# Patient Record
Sex: FEMALE | Race: WHITE | NOT HISPANIC OR LATINO | Employment: OTHER | URBAN - METROPOLITAN AREA
[De-identification: names, ages, dates, MRNs, and addresses within clinical notes are randomized per-mention and may not be internally consistent; named-entity substitution may affect disease eponyms.]

---

## 2017-04-19 ENCOUNTER — HOSPITAL ENCOUNTER (OUTPATIENT)
Dept: LAB | Facility: MEDICAL CENTER | Age: 62
End: 2017-04-19
Attending: PHYSICIAN ASSISTANT
Payer: COMMERCIAL

## 2017-04-19 LAB — TSH SERPL DL<=0.005 MIU/L-ACNC: 4.09 UIU/ML (ref 0.3–3.7)

## 2017-04-19 PROCEDURE — 80061 LIPID PANEL: CPT

## 2017-04-19 PROCEDURE — 36415 COLL VENOUS BLD VENIPUNCTURE: CPT

## 2017-04-19 PROCEDURE — 84443 ASSAY THYROID STIM HORMONE: CPT

## 2017-04-20 LAB
CHOLEST SERPL-MCNC: 242 MG/DL (ref 100–199)
HDLC SERPL-MCNC: 83 MG/DL
LDLC SERPL CALC-MCNC: 133 MG/DL
TRIGL SERPL-MCNC: 129 MG/DL (ref 0–149)

## 2017-07-11 ENCOUNTER — HOSPITAL ENCOUNTER (OUTPATIENT)
Dept: LAB | Facility: MEDICAL CENTER | Age: 62
End: 2017-07-11
Attending: PHYSICIAN ASSISTANT
Payer: COMMERCIAL

## 2017-07-11 LAB
T4 SERPL-MCNC: 6.7 UG/DL (ref 4–12)
TSH SERPL DL<=0.005 MIU/L-ACNC: 3.35 UIU/ML (ref 0.3–3.7)

## 2017-07-11 PROCEDURE — 84436 ASSAY OF TOTAL THYROXINE: CPT

## 2017-07-11 PROCEDURE — 84443 ASSAY THYROID STIM HORMONE: CPT

## 2017-07-11 PROCEDURE — 36415 COLL VENOUS BLD VENIPUNCTURE: CPT

## 2017-08-14 ENCOUNTER — OFFICE VISIT (OUTPATIENT)
Dept: CARDIOLOGY | Facility: MEDICAL CENTER | Age: 62
End: 2017-08-14
Payer: COMMERCIAL

## 2017-08-14 VITALS
BODY MASS INDEX: 22.82 KG/M2 | HEIGHT: 65 IN | DIASTOLIC BLOOD PRESSURE: 62 MMHG | SYSTOLIC BLOOD PRESSURE: 92 MMHG | HEART RATE: 81 BPM | OXYGEN SATURATION: 99 % | WEIGHT: 137 LBS

## 2017-08-14 DIAGNOSIS — R00.2 PALPITATIONS: ICD-10-CM

## 2017-08-14 DIAGNOSIS — I49.3 PREMATURE VENTRICULAR CONTRACTIONS: ICD-10-CM

## 2017-08-14 DIAGNOSIS — E07.9 THYROID DYSFUNCTION: ICD-10-CM

## 2017-08-14 PROCEDURE — 99214 OFFICE O/P EST MOD 30 MIN: CPT | Performed by: INTERNAL MEDICINE

## 2017-08-14 RX ORDER — THYROID,PORK 16.25 MG
TABLET ORAL
Refills: 1 | COMMUNITY
Start: 2017-07-06 | End: 2021-02-03

## 2017-08-14 ASSESSMENT — ENCOUNTER SYMPTOMS
FOCAL WEAKNESS: 0
WEAKNESS: 0
PND: 0
SPEECH CHANGE: 0
NAUSEA: 0
ORTHOPNEA: 0
HEMOPTYSIS: 0
ABDOMINAL PAIN: 0
CLAUDICATION: 0
DIZZINESS: 0
VOMITING: 0
MYALGIAS: 0
PALPITATIONS: 1
SHORTNESS OF BREATH: 0

## 2017-08-14 NOTE — MR AVS SNAPSHOT
"        Allyn Rosenberg   2017 3:40 PM   Office Visit   MRN: [id number]    Department:  [facility]   Dept Phone:  [phone]    Description:  Female : 1955   Provider:  Jacquelyn Nur M.D.           Reason for Visit     Follow-Up           Allergies as of 2017     Allergen Noted Reactions    Other Drug 2017       Pt states allergic to \"myacins\"      You were diagnosed with     Palpitations   [785.1.ICD-9-CM]       Premature ventricular contractions   [838116]         Vital Signs     Blood Pressure Pulse Height Weight Body Mass Index Oxygen Saturation    92/62 mmHg 81 1.651 m (5' 5\") 62.143 kg (137 lb) 22.80 kg/m2 99%    Smoking Status                   Former Smoker           Basic Information     Date Of Birth Sex Race Ethnicity Preferred Language    1955 Female White Non- English      Health Maintenance        Date Due Completion Dates    IMM DTaP/Tdap/Td Vaccine (1 - Tdap) 1973 ---    PAP SMEAR 1975 ---    COLONOSCOPY 2004 ---    IMM ZOSTER VACCINE 2014 ---    MAMMOGRAM 2017    IMM INFLUENZA (1) 2017 ---            Current Immunizations     No immunizations on file.      Below and/or attached are the medications your provider expects you to take. Review all of your home medications and newly ordered medications with your provider and/or pharmacist. Follow medication instructions as directed by your provider and/or pharmacist. Please keep your medication list with you and share with your provider. Update the information when medications are discontinued, doses are changed, or new medications (including over-the-counter products) are added; and carry medication information at all times in the event of emergency situations     Allergies:  OTHER DRUG - (reactions not documented)               Medications  Valid as of: 2017 -  3:47 PM    Generic Name Brand Name Tablet Size Instructions for use    Amitriptyline HCl (Tab) " ELAVIL 10 MG Take 10 mg by mouth every day.        BuPROPion HCl (TABLET SR 24 HR) WELLBUTRIN  MG Take 150 mg by mouth every day.        Cholecalciferol   Take 50,000 Units by mouth every 7 days.        Multiple Vitamin   Take  by mouth.        Propranolol HCl (Tab) INDERAL 10 MG Take 1 Tab by mouth every 6 hours as needed.        Thyroid (Tab) ARMOUR THYROID 15 MG Take 1 Tab by mouth every day.        Thyroid (Tab) NATURE-THROID 16.25 MG TK 1 AND 1/2 TS PO QD        .                 Medicines prescribed today were sent to:     [id number]  [place], [place] - [address] [facility]    [address] [place] [address]    Phone: [phone] Fax: [phone]    Open 24 Hours?: No      Medication refill instructions:       If your prescription bottle indicates you have medication refills left, it is not necessary to call your provider’s office. Please contact your pharmacy and they will refill your medication.    If your prescription bottle indicates you do not have any refills left, you may request refills at any time through one of the following ways: The online [employer] system (except Urgent Care), by calling your provider’s office, or by asking your pharmacy to contact your provider’s office with a refill request. Medication refills are processed only during regular business hours and may not be available until the next business day. Your provider may request additional information or to have a follow-up visit with you prior to refilling your medication.   *Please Note: Medication refills are assigned a new Rx number when refilled electronically. Your pharmacy may indicate that no refills were authorized even though a new prescription for the same medication is available at the pharmacy. Please request the medicine by name with the pharmacy before contacting your provider for a refill.           [employer] Access Code: Activation code not generated  Current [employer]  Status: Active

## 2017-08-14 NOTE — PROGRESS NOTES
"Subjective:   Allyn Rosenberg is a 62 y.o. female who presents today for f/u PVCs    The patient is doing well from cardiac standpoint.   Much less palpitations lately.  Rarely needs to take propanolol and does not wish to take them due to concern about potential interaction with thyroid medication.  She denies any chest pain, or heart failure type symptoms.  TSH Was slightly high in April. Had her thyroid dose adjusted.  Back down to NL last month    Family History   Problem Relation Age of Onset   • Hypertension Father    • Lung Disease Father    • Other Father    • Heart Disease Neg Hx      History   Smoking status   • Former Smoker -- 0.50 packs/day for 10 years   • Types: Cigarettes   Smokeless tobacco   • Not on file     Allergies   Allergen Reactions   • Other Drug      Pt states allergic to \"myacins\"     Outpatient Encounter Prescriptions as of 8/14/2017   Medication Sig Dispense Refill   • NATURE-THROID 16.25 MG Tab TK 1 AND 1/2 TS PO QD  1   • Cholecalciferol (VITAMIN D PO) Take 50,000 Units by mouth every 7 days.     • amitriptyline (ELAVIL) 10 MG Tab Take 10 mg by mouth every day.  1   • buPROPion (WELLBUTRIN XL) 150 MG XL tablet Take 150 mg by mouth every day.  2   • propranolol (INDERAL) 10 MG Tab Take 1 Tab by mouth every 6 hours as needed. 30 Tab 5   • Multiple Vitamin (MULTIVITAMINS PO) Take  by mouth.     • thyroid (ARMOUR THYROID) 15 MG Tab Take 1 Tab by mouth every day. 30 Tab 3     No facility-administered encounter medications on file as of 8/14/2017.     Review of Systems   Constitutional: Negative for malaise/fatigue.   HENT: Negative for congestion.    Respiratory: Negative for hemoptysis and shortness of breath.    Cardiovascular: Positive for palpitations. Negative for chest pain, orthopnea, claudication, leg swelling and PND.        More noticeable at night but better since her thyroid dose was increased   Gastrointestinal: Negative for nausea, vomiting and abdominal pain. " "  Musculoskeletal: Negative for myalgias.   Neurological: Negative for dizziness, speech change, focal weakness and weakness.        Objective:   BP 92/62 mmHg  Pulse 81  Ht 1.651 m (5' 5\")  Wt 62.143 kg (137 lb)  BMI 22.80 kg/m2  SpO2 99%    Physical Exam   Constitutional: She is oriented to person, place, and time. No distress.   HENT:   Mouth/Throat: Mucous membranes are normal.   Eyes: Conjunctivae and EOM are normal.   Neck: No JVD present. No tracheal deviation present. No thyroid mass and no thyromegaly present.   Cardiovascular: Normal rate, regular rhythm and intact distal pulses.    No murmur heard.  Pulmonary/Chest: Effort normal and breath sounds normal. No respiratory distress. She exhibits no tenderness.   Abdominal: Soft. There is no tenderness.   Musculoskeletal: Normal range of motion. She exhibits no edema.   Neurological: She is alert and oriented to person, place, and time. She has normal strength. She displays no tremor.   Skin: Skin is warm and dry. She is not diaphoretic.   Psychiatric: She has a normal mood and affect. Her behavior is normal.   Vitals reviewed.      Assessment:     1. Palpitations     2. Premature ventricular contractions     3. Thyroid dysfunction         Medical Decision Making:  Today's Assessment / Status / Plan:     She is doing better.  We again discussed good prognosis nature of PVCs with normal heart.  She prefers conservative approach which is quite acceptable.  Will see her in 1 year or sooner as needed.  We will keep you posted about our findings and further recommendations as they become available. Please also do not hesitate to call for any questions.  Thank you kindly for allowing me to participate in the care of this patient.  "

## 2017-08-14 NOTE — Clinical Note
"     [facility]   [address], [address]  [place] [address]  Phone: [phone]  Fax: [phone]              Allyn Rosenberg  1955    Encounter Date: 8/14/2017    Jacquelyn Nur M.D.          PROGRESS NOTE:  Subjective:   Allyn Rosenberg is a 62 y.o. female who presents today for f/u PVCs    The patient is doing well from cardiac standpoint.   Much less palpitations lately.  Rarely needs to take propanolol and does not wish to take them due to concern about potential interaction with thyroid medication.  She denies any chest pain, or heart failure type symptoms.  TSH Was slightly high in April. Had her thyroid dose adjusted.  Back down to NL last month    Family History   Problem Relation Age of Onset   • Hypertension Father    • Lung Disease Father    • Other Father    • Heart Disease Neg Hx      History   Smoking status   • Former Smoker -- 0.50 packs/day for 10 years   • Types: Cigarettes   Smokeless tobacco   • Not on file     Allergies   Allergen Reactions   • Other Drug      Pt states allergic to \"myacins\"     Outpatient Encounter Prescriptions as of 8/14/2017   Medication Sig Dispense Refill   • NATURE-THROID 16.25 MG Tab TK 1 AND 1/2 TS PO QD  1   • Cholecalciferol (VITAMIN D PO) Take 50,000 Units by mouth every 7 days.     • amitriptyline (ELAVIL) 10 MG Tab Take 10 mg by mouth every day.  1   • buPROPion (WELLBUTRIN XL) 150 MG XL tablet Take 150 mg by mouth every day.  2   • propranolol (INDERAL) 10 MG Tab Take 1 Tab by mouth every 6 hours as needed. 30 Tab 5   • Multiple Vitamin (MULTIVITAMINS PO) Take  by mouth.     • thyroid (ARMOUR THYROID) 15 MG Tab Take 1 Tab by mouth every day. 30 Tab 3     No facility-administered encounter medications on file as of 8/14/2017.     Review of Systems   Constitutional: Negative for malaise/fatigue.   HENT: Negative for congestion.    Respiratory: Negative for hemoptysis and shortness of breath.    " "  Cardiovascular: Positive for palpitations. Negative for chest pain, orthopnea, claudication, leg swelling and PND.        More noticeable at night but better since her thyroid dose was increased   Gastrointestinal: Negative for nausea, vomiting and abdominal pain.   Musculoskeletal: Negative for myalgias.   Neurological: Negative for dizziness, speech change, focal weakness and weakness.        Objective:   BP 92/62 mmHg  Pulse 81  Ht 1.651 m (5' 5\")  Wt 62.143 kg (137 lb)  BMI 22.80 kg/m2  SpO2 99%    Physical Exam   Constitutional: She is oriented to person, place, and time. No distress.   HENT:   Mouth/Throat: Mucous membranes are normal.   Eyes: Conjunctivae and EOM are normal.   Neck: No JVD present. No tracheal deviation present. No thyroid mass and no thyromegaly present.   Cardiovascular: Normal rate, regular rhythm and intact distal pulses.    No murmur heard.  Pulmonary/Chest: Effort normal and breath sounds normal. No respiratory distress. She exhibits no tenderness.   Abdominal: Soft. There is no tenderness.   Musculoskeletal: Normal range of motion. She exhibits no edema.   Neurological: She is alert and oriented to person, place, and time. She has normal strength. She displays no tremor.   Skin: Skin is warm and dry. She is not diaphoretic.   Psychiatric: She has a normal mood and affect. Her behavior is normal.   Vitals reviewed.      Assessment:     1. Palpitations     2. Premature ventricular contractions     3. Thyroid dysfunction         Medical Decision Making:  Today's Assessment / Status / Plan:     She is doing better.  We again discussed good prognosis nature of PVCs with normal heart.  She prefers conservative approach which is quite acceptable.  Will see her in 1 year or sooner as needed.  We will keep you posted about our findings and further recommendations as they become available. Please also do not hesitate to call for any questions.  Thank you kindly for allowing me to " participate in the care of this patient.      No Recipients

## 2017-09-10 ENCOUNTER — APPOINTMENT (OUTPATIENT)
Dept: RADIOLOGY | Facility: MEDICAL CENTER | Age: 62
End: 2017-09-10
Attending: EMERGENCY MEDICINE
Payer: COMMERCIAL

## 2017-09-10 ENCOUNTER — HOSPITAL ENCOUNTER (EMERGENCY)
Facility: MEDICAL CENTER | Age: 62
End: 2017-09-10
Attending: EMERGENCY MEDICINE
Payer: COMMERCIAL

## 2017-09-10 VITALS
DIASTOLIC BLOOD PRESSURE: 60 MMHG | TEMPERATURE: 96.7 F | RESPIRATION RATE: 16 BRPM | BODY MASS INDEX: 22.04 KG/M2 | HEART RATE: 65 BPM | WEIGHT: 137.13 LBS | OXYGEN SATURATION: 97 % | SYSTOLIC BLOOD PRESSURE: 129 MMHG | HEIGHT: 66 IN

## 2017-09-10 DIAGNOSIS — K57.31 DIVERTICULOSIS OF LARGE INTESTINE WITH HEMORRHAGE: ICD-10-CM

## 2017-09-10 DIAGNOSIS — K62.5 RECTAL BLEEDING: ICD-10-CM

## 2017-09-10 LAB
ALBUMIN SERPL BCP-MCNC: 4.3 G/DL (ref 3.2–4.9)
ALBUMIN/GLOB SERPL: 1.5 G/DL
ALP SERPL-CCNC: 73 U/L (ref 30–99)
ALT SERPL-CCNC: 10 U/L (ref 2–50)
ANION GAP SERPL CALC-SCNC: 9 MMOL/L (ref 0–11.9)
APTT PPP: 25 SEC (ref 24.7–36)
AST SERPL-CCNC: 12 U/L (ref 12–45)
BASOPHILS # BLD AUTO: 0.3 % (ref 0–1.8)
BASOPHILS # BLD: 0.03 K/UL (ref 0–0.12)
BILIRUB SERPL-MCNC: 0.5 MG/DL (ref 0.1–1.5)
BUN SERPL-MCNC: 15 MG/DL (ref 8–22)
CALCIUM SERPL-MCNC: 9.7 MG/DL (ref 8.5–10.5)
CHLORIDE SERPL-SCNC: 105 MMOL/L (ref 96–112)
CO2 SERPL-SCNC: 24 MMOL/L (ref 20–33)
CREAT SERPL-MCNC: 0.7 MG/DL (ref 0.5–1.4)
EOSINOPHIL # BLD AUTO: 0.05 K/UL (ref 0–0.51)
EOSINOPHIL NFR BLD: 0.5 % (ref 0–6.9)
ERYTHROCYTE [DISTWIDTH] IN BLOOD BY AUTOMATED COUNT: 44.2 FL (ref 35.9–50)
GFR SERPL CREATININE-BSD FRML MDRD: >60 ML/MIN/1.73 M 2
GLOBULIN SER CALC-MCNC: 2.9 G/DL (ref 1.9–3.5)
GLUCOSE SERPL-MCNC: 96 MG/DL (ref 65–99)
HCT VFR BLD AUTO: 38.3 % (ref 37–47)
HGB BLD-MCNC: 12.8 G/DL (ref 12–16)
IMM GRANULOCYTES # BLD AUTO: 0.03 K/UL (ref 0–0.11)
IMM GRANULOCYTES NFR BLD AUTO: 0.3 % (ref 0–0.9)
INR PPP: 0.9 (ref 0.87–1.13)
LIPASE SERPL-CCNC: 51 U/L (ref 11–82)
LYMPHOCYTES # BLD AUTO: 1.56 K/UL (ref 1–4.8)
LYMPHOCYTES NFR BLD: 16.6 % (ref 22–41)
MCH RBC QN AUTO: 31.2 PG (ref 27–33)
MCHC RBC AUTO-ENTMCNC: 33.4 G/DL (ref 33.6–35)
MCV RBC AUTO: 93.4 FL (ref 81.4–97.8)
MONOCYTES # BLD AUTO: 0.73 K/UL (ref 0–0.85)
MONOCYTES NFR BLD AUTO: 7.7 % (ref 0–13.4)
NEUTROPHILS # BLD AUTO: 7.02 K/UL (ref 2–7.15)
NEUTROPHILS NFR BLD: 74.6 % (ref 44–72)
NRBC # BLD AUTO: 0.02 K/UL
NRBC BLD AUTO-RTO: 0.2 /100 WBC
PLATELET # BLD AUTO: 265 K/UL (ref 164–446)
PMV BLD AUTO: 9.9 FL (ref 9–12.9)
POTASSIUM SERPL-SCNC: 3.4 MMOL/L (ref 3.6–5.5)
PROT SERPL-MCNC: 7.2 G/DL (ref 6–8.2)
PROTHROMBIN TIME: 12.4 SEC (ref 12–14.6)
RBC # BLD AUTO: 4.1 M/UL (ref 4.2–5.4)
SODIUM SERPL-SCNC: 138 MMOL/L (ref 135–145)
WBC # BLD AUTO: 9.4 K/UL (ref 4.8–10.8)

## 2017-09-10 PROCEDURE — 94760 N-INVAS EAR/PLS OXIMETRY 1: CPT

## 2017-09-10 PROCEDURE — 80053 COMPREHEN METABOLIC PANEL: CPT

## 2017-09-10 PROCEDURE — 74177 CT ABD & PELVIS W/CONTRAST: CPT

## 2017-09-10 PROCEDURE — 85025 COMPLETE CBC W/AUTO DIFF WBC: CPT

## 2017-09-10 PROCEDURE — 85730 THROMBOPLASTIN TIME PARTIAL: CPT

## 2017-09-10 PROCEDURE — 700117 HCHG RX CONTRAST REV CODE 255: Performed by: EMERGENCY MEDICINE

## 2017-09-10 PROCEDURE — 83690 ASSAY OF LIPASE: CPT

## 2017-09-10 PROCEDURE — 700105 HCHG RX REV CODE 258: Performed by: EMERGENCY MEDICINE

## 2017-09-10 PROCEDURE — 99284 EMERGENCY DEPT VISIT MOD MDM: CPT

## 2017-09-10 PROCEDURE — 85610 PROTHROMBIN TIME: CPT

## 2017-09-10 RX ORDER — SODIUM CHLORIDE 9 MG/ML
1000 INJECTION, SOLUTION INTRAVENOUS ONCE
Status: COMPLETED | OUTPATIENT
Start: 2017-09-10 | End: 2017-09-10

## 2017-09-10 RX ADMIN — IOHEXOL 100 ML: 350 INJECTION, SOLUTION INTRAVENOUS at 11:55

## 2017-09-10 RX ADMIN — SODIUM CHLORIDE 1000 ML: 9 INJECTION, SOLUTION INTRAVENOUS at 11:00

## 2017-09-10 NOTE — ED NOTES
.  Chief Complaint   Patient presents with   • Rectal Bleeding     last night    • Rectal Pain     last night with bm   • Bloody Stools     brb this am     Ambulated to triage with above c/c.

## 2017-09-10 NOTE — ED PROVIDER NOTES
ED Provider Note    Scribed for Jose R Smith M.D. by Hector Oscar. 9/10/2017  10:04 AM    Primary care provider: Jennifer Mckenzie P.A.-C.  Means of arrival: Walk in  History obtained from: Patient  History limited by: None    CHIEF COMPLAINT  Chief Complaint   Patient presents with   • Rectal Bleeding     last night    • Rectal Pain     last night with bm   • Bloody Stools     brb this am       HPI  Allyn Rosenberg is a 62 y.o. female who presents to the Emergency Department complaining of rectal bleeding onset last night when she was having a bowel movement. She describes her bowel movement as very painful with bleeding. Her bleeding is described as bright red with no stool. She has associated lower abdominal pain. When she tried to have a bowel movement today, she reports experiencing immediate pain before she passed blood with no stool. The patient states she is not taking any blood thinners. She does not currently have a GI doctor. Denies nausea, vomiting, fever, loss of appetite, rectal pain.      REVIEW OF SYSTEMS  Pertinent positives include rectal bleeding, abdominal pain. Pertinent negatives include no nausea, vomiting, fever, loss of appetite, rectal pain. All other systems reviewed and negative. C.     PAST MEDICAL HISTORY   No pertinent past medical history.     SURGICAL HISTORY  patient denies any surgical history    SOCIAL HISTORY  Social History   Substance Use Topics   • Smoking status: Former Smoker     Packs/day: 0.50     Years: 10.00     Types: Cigarettes   • Alcohol use 3.0 - 6.0 oz/week     5 - 10 Glasses of wine per week      History   Drug Use No       FAMILY HISTORY  Family History   Problem Relation Age of Onset   • Hypertension Father    • Lung Disease Father    • Other Father    • Heart Disease Neg Hx        CURRENT MEDICATIONS  No current facility-administered medications on file prior to encounter.      Current Outpatient Prescriptions on File Prior to Encounter  "  Medication Sig Dispense Refill   • NATURE-THROID 16.25 MG Tab TK 1 AND 1/2 TS PO QD  1   • Cholecalciferol (VITAMIN D PO) Take 50,000 Units by mouth every 7 days.     • amitriptyline (ELAVIL) 10 MG Tab Take 10 mg by mouth every day.  1   • buPROPion (WELLBUTRIN XL) 150 MG XL tablet Take 150 mg by mouth every day.  2   • thyroid (ARMOUR THYROID) 15 MG Tab Take 1 Tab by mouth every day. 30 Tab 3   • propranolol (INDERAL) 10 MG Tab Take 1 Tab by mouth every 6 hours as needed. 30 Tab 5   • Multiple Vitamin (MULTIVITAMINS PO) Take  by mouth.        ALLERGIES  Allergies   Allergen Reactions   • Other Drug      Pt states allergic to \"myacins\"       PHYSICAL EXAM  VITAL SIGNS: /60   Pulse 77   Temp 35.9 °C (96.7 °F)   Resp 16   Ht 1.676 m (5' 6\")   Wt 62.2 kg (137 lb 2 oz)   SpO2 100%   BMI 22.13 kg/m²     Constitutional: Well developed, Well nourished, mild distress, Non-toxic appearance.   HENT: Normocephalic, Atraumatic, Bilateral external ears normal, Oropharynx moist, No oral exudates.   Eyes: PERRLA, EOMI, Conjunctiva normal, No discharge.   Neck: No tenderness, Supple, No stridor.   Lymphatic: No lymphadenopathy noted.   Cardiovascular: Normal heart rate, Normal rhythm.   Thorax & Lungs: Clear to auscultation bilaterally, No respiratory distress, No wheezing, No crackles.   Abdomen: Superpubic abdominal tenderness, Soft, No masses, No pulsatile masses.   Rectal: without any fissures or hemorrhoids seen   Skin: Warm, Dry, No erythema, No rash.   Extremities:, No edema No cyanosis.   Musculoskeletal: No tenderness to palpation or major deformities noted.  Intact distal pulses  Neurologic: Awake, alert. Moves all extremities spontaneously.  Psychiatric: Affect normal, Judgment normal, Mood normal.     LABS  Results for orders placed or performed during the hospital encounter of 09/10/17   CBC WITH DIFFERENTIAL   Result Value Ref Range    WBC 9.4 4.8 - 10.8 K/uL    RBC 4.10 (L) 4.20 - 5.40 M/uL    " Hemoglobin 12.8 12.0 - 16.0 g/dL    Hematocrit 38.3 37.0 - 47.0 %    MCV 93.4 81.4 - 97.8 fL    MCH 31.2 27.0 - 33.0 pg    MCHC 33.4 (L) 33.6 - 35.0 g/dL    RDW 44.2 35.9 - 50.0 fL    Platelet Count 265 164 - 446 K/uL    MPV 9.9 9.0 - 12.9 fL    Neutrophils-Polys 74.60 (H) 44.00 - 72.00 %    Lymphocytes 16.60 (L) 22.00 - 41.00 %    Monocytes 7.70 0.00 - 13.40 %    Eosinophils 0.50 0.00 - 6.90 %    Basophils 0.30 0.00 - 1.80 %    Immature Granulocytes 0.30 0.00 - 0.90 %    Nucleated RBC 0.20 /100 WBC    Neutrophils (Absolute) 7.02 2.00 - 7.15 K/uL    Lymphs (Absolute) 1.56 1.00 - 4.80 K/uL    Monos (Absolute) 0.73 0.00 - 0.85 K/uL    Eos (Absolute) 0.05 0.00 - 0.51 K/uL    Baso (Absolute) 0.03 0.00 - 0.12 K/uL    Immature Granulocytes (abs) 0.03 0.00 - 0.11 K/uL    NRBC (Absolute) 0.02 K/uL   COMP METABOLIC PANEL   Result Value Ref Range    Sodium 138 135 - 145 mmol/L    Potassium 3.4 (L) 3.6 - 5.5 mmol/L    Chloride 105 96 - 112 mmol/L    Co2 24 20 - 33 mmol/L    Anion Gap 9.0 0.0 - 11.9    Glucose 96 65 - 99 mg/dL    Bun 15 8 - 22 mg/dL    Creatinine 0.70 0.50 - 1.40 mg/dL    Calcium 9.7 8.5 - 10.5 mg/dL    AST(SGOT) 12 12 - 45 U/L    ALT(SGPT) 10 2 - 50 U/L    Alkaline Phosphatase 73 30 - 99 U/L    Total Bilirubin 0.5 0.1 - 1.5 mg/dL    Albumin 4.3 3.2 - 4.9 g/dL    Total Protein 7.2 6.0 - 8.2 g/dL    Globulin 2.9 1.9 - 3.5 g/dL    A-G Ratio 1.5 g/dL   LIPASE   Result Value Ref Range    Lipase 51 11 - 82 U/L   APTT   Result Value Ref Range    APTT 25.0 24.7 - 36.0 sec   PROTHROMBIN TIME (INR)   Result Value Ref Range    PT 12.4 12.0 - 14.6 sec    INR 0.90 0.87 - 1.13   ESTIMATED GFR   Result Value Ref Range    GFR If African American >60 >60 mL/min/1.73 m 2    GFR If Non African American >60 >60 mL/min/1.73 m 2      All labs reviewed by me.    RADIOLOGY  CT-ABDOMEN-PELVIS WITH   Final Result      Diverticulosis without evidence of diverticulitis.      Hepatic and left renal cysts.      Appendix not visualized.         The radiologist's interpretation of all radiological studies have been reviewed by me.    COURSE & MEDICAL DECISION MAKING  Pertinent Labs & Imaging studies reviewed. (See chart for details)    I reviewed the patient's medical records.    10:04 AM - Patient seen and examined at bedside. Patient will be treated with NS IV 1000 ml due to IV contrast. Ordered abdomen Ct, CBC, CMP, lipase, APTT, prothrombin  to evaluate her symptoms. The differential diagnoses include but are not limited to: internal hemorrhoids, diverticulitis, mass     10:29 AM Performed rectal exam.    2:03 PM I discussed the patient's case and the above findings with gastroenterologist will follow-up with the patient.    Decision Making:  Patient comes in with bright red blood per rectum also with some lower abdominal pain, workup here shows a hemoglobin of 12, CT scan shows diverticulosis without any diverticulitis or ischemic colitis, no white blood cell count. Patient has not been passing any more stool here, we'll discharge patient home, have the patient follow up with GI in the next few days, return with worsening symptoms. Repeat abdominal examination is benign.     The patient will return for new or worsening symptoms and is stable at the time of discharge.    The patient is referred to a primary physician for blood pressure management, diabetic screening, and for all other preventative health concerns.    DISPOSITION:  Patient will be discharged home in stable condition.    FOLLOW UP:  [facility], Emergency Dept  [address] [place] [address] [phone]    If symptoms worsen      OUTPATIENT MEDICATIONS:  Discharge Medication List as of 9/10/2017  2:17 PM            FINAL IMPRESSION  1. Rectal bleeding    2. Diverticulosis of large intestine with hemorrhage          IHector am (Scribe) scribing for, and in the presence of, Jose R Smith M.D..    Electronically signed by: Hector Oscar  (Scribe), 9/10/2017    IJose R M.D. personally performed the services described in this documentation, as scribed by Hector Oscar in my presence, and it is both accurate and complete.    The note accurately reflects work and decisions made by me.  Jose R Smith  9/10/2017  3:46 PM

## 2017-09-10 NOTE — ED NOTES
Pt reports rectal pain with bowel movement last night.  Pt states she did not have a large or exceptionally hard BM, that it was normal.  Pt reports RLQ pain and bright red rectal bleeding starting this morning.

## 2017-09-10 NOTE — ED NOTES
Pt provided with discharge instructions, instructions for follow up appointment with PCP, s/s of when to seek emergency care.  Pt verbalizes understanding.  Pt discharged in good condition

## 2017-09-10 NOTE — DISCHARGE INSTRUCTIONS
"Please follow-up with your primary care provider for blood pressure management.        Bloody Stools  Bloody stools often mean that there is a problem in the digestive tract. Your caregiver may use the term \"melena\" to describe black, tarry, and bad smelling stools or \"hematochezia\" to describe red or maroon-colored stools. Blood seen in the stool can be caused by bleeding anywhere along the intestinal tract.   A black stool usually means that blood is coming from the upper part of the gastrointestinal tract (esophagus, stomach, or small bowel). Passing maroon-colored stools or bright red blood usually means that blood is coming from lower down in the large bowel or the rectum. However, sometimes massive bleeding in the stomach or small intestine can cause bright red bloody stools.   Consuming black licorice, lead, iron pills, medicines containing bismuth subsalicylate, or blueberries can also cause black stools. Your caregiver can test black stools to see if blood is present.  It is important that the cause of the bleeding be found. Treatment can then be started, and the problem can be corrected. Rectal bleeding may not be serious, but you should not assume everything is okay until you know the cause. It is very important to follow up with your caregiver or a specialist in gastrointestinal problems.  CAUSES   Blood in the stools can come from various underlying causes. Often, the cause is not found during your first visit. Testing is often needed to discover the cause of bleeding in the gastrointestinal tract. Causes range from simple to serious or even life-threatening. Possible causes include:  · Hemorrhoids. These are veins that are full of blood (engorged) in the rectum. They cause pain, inflammation, and may bleed.  · Anal fissures. These are areas of painful tearing which may bleed. They are often caused by passing hard stool.  · Diverticulosis. These are pouches that form on the colon over time, with age, " and may bleed significantly.  · Diverticulitis. This is inflammation in areas with diverticulosis. It can cause pain, fever, and bloody stools, although bleeding is rare.  · Proctitis and colitis. These are inflamed areas of the rectum or colon. They may cause pain, fever, and bloody stools.  · Polyps and cancer. Colon cancer is a leading cause of preventable cancer death. It often starts out as precancerous polyps that can be removed during a colonoscopy, preventing progression into cancer. Sometimes, polyps and cancer may cause rectal bleeding.  · Gastritis and ulcers. Bleeding from the upper gastrointestinal tract (near the stomach) may travel through the intestines and produce black, sometimes tarry, often bad smelling stools. In certain cases, if the bleeding is fast enough, the stools may not be black, but red and the condition may be life-threatening.  SYMPTOMS   You may have stools that are bright red and bloody, that are normal color with blood on them, or that are dark black and tarry. In some cases, you may only have blood in the toilet bowl. Any of these cases need medical care. You may also have:  · Pain at the anus or anywhere in the rectum.  · Lightheadedness or feeling faint.  · Extreme weakness.  · Nausea or vomiting.  · Fever.  DIAGNOSIS  Your caregiver may use the following methods to find the cause of your bleeding:  · Taking a medical history. Age is important. Older people tend to develop polyps and cancer more often. If there is anal pain and a hard, large stool associated with bleeding, a tear of the anus may be the cause. If blood drips into the toilet after a bowel movement, bleeding hemorrhoids may be the problem. The color and frequency of the bleeding are additional considerations. In most cases, the medical history provides clues, but seldom the final answer.  · A visual and finger (digital) exam. Your caregiver will inspect the anal area, looking for tears and hemorrhoids. A finger  exam can provide information when there is tenderness or a growth inside. In men, the prostate is also examined.  · Endoscopy. Several types of small, long scopes (endoscopes) are used to view the colon.  · In the office, your caregiver may use a rigid, or more commonly, a flexible viewing sigmoidoscope. This exam is called flexible sigmoidoscopy. It is performed in 5 to 10 minutes.  · A more thorough exam is accomplished with a colonoscope. It allows your caregiver to view the entire 5 to 6 foot long colon. Medicine to help you relax (sedative) is usually given for this exam. Frequently, a bleeding lesion may be present beyond the reach of the sigmoidoscope. So, a colonoscopy may be the best exam to start with. Both exams are usually done on an outpatient basis. This means the patient does not stay overnight in the hospital or surgery center.  · An upper endoscopy may be needed to examine your stomach. Sedation is used and a flexible endoscope is put in your mouth, down to your stomach.  · A barium enema X-ray. This is an X-ray exam. It uses liquid barium inserted by enema into the rectum. This test alone may not identify an actual bleeding point. X-rays highlight abnormal shadows, such as those made by lumps (tumors), diverticuli, or colitis.  TREATMENT   Treatment depends on the cause of your bleeding.   · For bleeding from the stomach or colon, the caregiver doing your endoscopy or colonoscopy may be able to stop the bleeding as part of the procedure.  · Inflammation or infection of the colon can be treated with medicines.  · Many rectal problems can be treated with creams, suppositories, or warm baths.  · Surgery is sometimes needed.  · Blood transfusions are sometimes needed if you have lost a lot of blood.  · For any bleeding problem, let your caregiver know if you take aspirin or other blood thinners regularly.  HOME CARE INSTRUCTIONS   · Take any medicines exactly as prescribed.  · Keep your stools soft by  eating a diet high in fiber. Prunes (1 to 3 a day) work well for many people.  · Drink enough water and fluids to keep your urine clear or pale yellow.  · Take sitz baths if advised. A sitz bath is when you sit in a bathtub with warm water for 10 to 15 minutes to soak, soothe, and cleanse the rectal area.  · If enemas or suppositories are advised, be sure you know how to use them. Tell your caregiver if you have problems with this.  · Monitor your bowel movements to look for signs of improvement or worsening.  SEEK MEDICAL CARE IF:   · You do not improve in the time expected.  · Your condition worsens after initial improvement.  · You develop any new symptoms.  SEEK IMMEDIATE MEDICAL CARE IF:   · You develop severe or prolonged rectal bleeding.  · You vomit blood.  · You feel weak or faint.  · You have a fever.  MAKE SURE YOU:  · Understand these instructions.  · Will watch your condition.  · Will get help right away if you are not doing well or get worse.  Document Released: 12/08/2003 Document Revised: 03/11/2013 Document Reviewed: 05/04/2012  [employer]® Patient Information ©2014 [employer].      Diverticulosis  Diverticulosis is the condition that develops when small pouches (diverticula) form in the wall of your colon. Your colon, or large intestine, is where water is absorbed and stool is formed. The pouches form when the inside layer of your colon pushes through weak spots in the outer layers of your colon.  CAUSES   No one knows exactly what causes diverticulosis.  RISK FACTORS  · Being older than 50. Your risk for this condition increases with age. Diverticulosis is rare in people younger than 40 years. By age 80, almost everyone has it.  · Eating a low-fiber diet.  · Being frequently constipated.  · Being overweight.  · Not getting enough exercise.  · Smoking.  · Taking over-the-counter pain medicines, like aspirin and ibuprofen.  SYMPTOMS   Most people with diverticulosis do not have symptoms.  DIAGNOSIS    Because diverticulosis often has no symptoms, health care providers often discover the condition during an exam for other colon problems. In many cases, a health care provider will diagnose diverticulosis while using a flexible scope to examine the colon (colonoscopy).  TREATMENT   If you have never developed an infection related to diverticulosis, you may not need treatment. If you have had an infection before, treatment may include:  · Eating more fruits, vegetables, and grains.  · Taking a fiber supplement.  · Taking a live bacteria supplement (probiotic).  · Taking medicine to relax your colon.  HOME CARE INSTRUCTIONS   · Drink at least 6-8 glasses of water each day to prevent constipation.  · Try not to strain when you have a bowel movement.  · Keep all follow-up appointments.  If you have had an infection before:   · Increase the fiber in your diet as directed by your health care provider or dietitian.  · Take a dietary fiber supplement if your health care provider approves.  · Only take medicines as directed by your health care provider.  SEEK MEDICAL CARE IF:   · You have abdominal pain.  · You have bloating.  · You have cramps.  · You have not gone to the bathroom in 3 days.  SEEK IMMEDIATE MEDICAL CARE IF:   · Your pain gets worse.  · Your bloating becomes very bad.  · You have a fever or chills, and your symptoms suddenly get worse.  · You begin vomiting.  · You have bowel movements that are bloody or black.  MAKE SURE YOU:  · Understand these instructions.  · Will watch your condition.  · Will get help right away if you are not doing well or get worse.     This information is not intended to replace advice given to you by your health care provider. Make sure you discuss any questions you have with your health care provider.     Document Released: 09/14/2005 Document Revised: 12/23/2014 Document Reviewed: 11/12/2014  [employer] Interactive Patient Education ©2016 [employer]

## 2017-10-12 ENCOUNTER — HOSPITAL ENCOUNTER (OUTPATIENT)
Dept: LAB | Facility: MEDICAL CENTER | Age: 62
End: 2017-10-12
Attending: NURSE PRACTITIONER
Payer: COMMERCIAL

## 2017-10-12 LAB
CHOLEST SERPL-MCNC: 233 MG/DL (ref 100–199)
HDLC SERPL-MCNC: 86 MG/DL
LDLC SERPL CALC-MCNC: 130 MG/DL
T4 SERPL-MCNC: 6.5 UG/DL (ref 4–12)
TRIGL SERPL-MCNC: 87 MG/DL (ref 0–149)
TSH SERPL DL<=0.005 MIU/L-ACNC: 2.06 UIU/ML (ref 0.3–3.7)

## 2017-10-12 PROCEDURE — 36415 COLL VENOUS BLD VENIPUNCTURE: CPT

## 2017-10-12 PROCEDURE — 84436 ASSAY OF TOTAL THYROXINE: CPT

## 2017-10-12 PROCEDURE — 84443 ASSAY THYROID STIM HORMONE: CPT

## 2017-10-12 PROCEDURE — 80061 LIPID PANEL: CPT

## 2018-03-08 ENCOUNTER — HOSPITAL ENCOUNTER (OUTPATIENT)
Dept: LAB | Facility: MEDICAL CENTER | Age: 63
End: 2018-03-08
Attending: PHYSICIAN ASSISTANT
Payer: COMMERCIAL

## 2018-03-08 LAB
25(OH)D3 SERPL-MCNC: 21 NG/ML (ref 30–100)
ALBUMIN SERPL BCP-MCNC: 4.6 G/DL (ref 3.2–4.9)
ALBUMIN/GLOB SERPL: 1.7 G/DL
ALP SERPL-CCNC: 82 U/L (ref 30–99)
ALT SERPL-CCNC: 19 U/L (ref 2–50)
ANION GAP SERPL CALC-SCNC: 10 MMOL/L (ref 0–11.9)
AST SERPL-CCNC: 17 U/L (ref 12–45)
BASOPHILS # BLD AUTO: 0.3 % (ref 0–1.8)
BASOPHILS # BLD: 0.02 K/UL (ref 0–0.12)
BILIRUB SERPL-MCNC: 0.6 MG/DL (ref 0.1–1.5)
BUN SERPL-MCNC: 13 MG/DL (ref 8–22)
CALCIUM SERPL-MCNC: 9.5 MG/DL (ref 8.5–10.5)
CHLORIDE SERPL-SCNC: 101 MMOL/L (ref 96–112)
CHOLEST SERPL-MCNC: 253 MG/DL (ref 100–199)
CO2 SERPL-SCNC: 26 MMOL/L (ref 20–33)
CREAT SERPL-MCNC: 0.72 MG/DL (ref 0.5–1.4)
EOSINOPHIL # BLD AUTO: 0.04 K/UL (ref 0–0.51)
EOSINOPHIL NFR BLD: 0.5 % (ref 0–6.9)
ERYTHROCYTE [DISTWIDTH] IN BLOOD BY AUTOMATED COUNT: 43.8 FL (ref 35.9–50)
GLOBULIN SER CALC-MCNC: 2.7 G/DL (ref 1.9–3.5)
GLUCOSE SERPL-MCNC: 85 MG/DL (ref 65–99)
HCT VFR BLD AUTO: 39 % (ref 37–47)
HDLC SERPL-MCNC: 86 MG/DL
HGB BLD-MCNC: 12.7 G/DL (ref 12–16)
IMM GRANULOCYTES # BLD AUTO: 0.03 K/UL (ref 0–0.11)
IMM GRANULOCYTES NFR BLD AUTO: 0.4 % (ref 0–0.9)
LDLC SERPL CALC-MCNC: 147 MG/DL
LYMPHOCYTES # BLD AUTO: 2.46 K/UL (ref 1–4.8)
LYMPHOCYTES NFR BLD: 33.6 % (ref 22–41)
MCH RBC QN AUTO: 31 PG (ref 27–33)
MCHC RBC AUTO-ENTMCNC: 32.6 G/DL (ref 33.6–35)
MCV RBC AUTO: 95.1 FL (ref 81.4–97.8)
MONOCYTES # BLD AUTO: 0.67 K/UL (ref 0–0.85)
MONOCYTES NFR BLD AUTO: 9.1 % (ref 0–13.4)
NEUTROPHILS # BLD AUTO: 4.11 K/UL (ref 2–7.15)
NEUTROPHILS NFR BLD: 56.1 % (ref 44–72)
NRBC # BLD AUTO: 0 K/UL
NRBC BLD-RTO: 0 /100 WBC
PLATELET # BLD AUTO: 308 K/UL (ref 164–446)
PMV BLD AUTO: 10.2 FL (ref 9–12.9)
POTASSIUM SERPL-SCNC: 3.4 MMOL/L (ref 3.6–5.5)
PROT SERPL-MCNC: 7.3 G/DL (ref 6–8.2)
RBC # BLD AUTO: 4.1 M/UL (ref 4.2–5.4)
SODIUM SERPL-SCNC: 137 MMOL/L (ref 135–145)
TRIGL SERPL-MCNC: 99 MG/DL (ref 0–149)
TSH SERPL DL<=0.005 MIU/L-ACNC: 1.99 UIU/ML (ref 0.38–5.33)
WBC # BLD AUTO: 7.3 K/UL (ref 4.8–10.8)

## 2018-03-08 PROCEDURE — 80061 LIPID PANEL: CPT

## 2018-03-08 PROCEDURE — 84443 ASSAY THYROID STIM HORMONE: CPT

## 2018-03-08 PROCEDURE — 80053 COMPREHEN METABOLIC PANEL: CPT

## 2018-03-08 PROCEDURE — 85025 COMPLETE CBC W/AUTO DIFF WBC: CPT

## 2018-03-08 PROCEDURE — 36415 COLL VENOUS BLD VENIPUNCTURE: CPT

## 2018-03-08 PROCEDURE — 82306 VITAMIN D 25 HYDROXY: CPT

## 2018-04-22 DIAGNOSIS — I49.3 PREMATURE VENTRICULAR CONTRACTIONS: ICD-10-CM

## 2018-04-22 DIAGNOSIS — R00.2 PALPITATIONS: ICD-10-CM

## 2018-04-24 RX ORDER — PROPRANOLOL HYDROCHLORIDE 10 MG/1
TABLET ORAL
Qty: 120 TAB | Refills: 3 | Status: SHIPPED | OUTPATIENT
Start: 2018-04-24 | End: 2021-02-03

## 2018-09-06 ENCOUNTER — APPOINTMENT (RX ONLY)
Dept: URBAN - METROPOLITAN AREA CLINIC 20 | Facility: CLINIC | Age: 63
Setting detail: DERMATOLOGY
End: 2018-09-06

## 2018-09-06 DIAGNOSIS — Z41.9 ENCOUNTER FOR PROCEDURE FOR PURPOSES OTHER THAN REMEDYING HEALTH STATE, UNSPECIFIED: ICD-10-CM

## 2018-09-06 PROCEDURE — ? FRAXEL

## 2018-09-06 ASSESSMENT — LOCATION SIMPLE DESCRIPTION DERM
LOCATION SIMPLE: CHEST
LOCATION SIMPLE: NECK
LOCATION SIMPLE: LEFT FOREHEAD

## 2018-09-06 ASSESSMENT — LOCATION ZONE DERM
LOCATION ZONE: TRUNK
LOCATION ZONE: FACE
LOCATION ZONE: NECK

## 2018-09-06 ASSESSMENT — LOCATION DETAILED DESCRIPTION DERM
LOCATION DETAILED: LEFT CENTRAL POSTERIOR NECK
LOCATION DETAILED: LEFT MEDIAL FOREHEAD
LOCATION DETAILED: UPPER STERNUM

## 2018-09-06 NOTE — PROCEDURE: FRAXEL
Energy(Mj/Cm2): 45
Location: full face
Depth In Microns (Use Numbers Only, No Special Characters Or $): 290
Energy(Mj/Cm2): 1
Number Of Passes: 4
Total Coverage: 45%
Post-Care Instructions: I reviewed with the patient in detail post-care instructions. Patient should avoid sun until area fully healed.
Wavelength: 1550nm
Location: neck
Treatment Level: 6
Treatment Level: 3
Total Coverage: 30%
Wavelength: 1927nm
Detail Level: Zone
Energy(Mj/Cm2): 25
Consent: Written consent obtained, risks reviewed including but not limited to pain and incomplete improvement.
Price (Use Numbers Only, No Special Characters Or $): 0.00
Add Post-Care Below To The Note: No
Energy(Mj/Cm2): 20
Depth In Microns (Use Numbers Only, No Special Characters Or $): 6560
External Cooling Fan Speed: 5
Length Of Topical Anesthesia Application (Optional): 90 minutes
Indication: photodamage
Depth In Microns (Use Numbers Only, No Special Characters Or $): 202
Topical Anesthesia Type: 23% Lidocaine 7% Tetracaine

## 2019-02-14 ENCOUNTER — HOSPITAL ENCOUNTER (OUTPATIENT)
Dept: LAB | Facility: MEDICAL CENTER | Age: 64
End: 2019-02-14
Attending: PHYSICIAN ASSISTANT
Payer: COMMERCIAL

## 2019-02-14 LAB
25(OH)D3 SERPL-MCNC: 29 NG/ML (ref 30–100)
ALBUMIN SERPL BCP-MCNC: 4.5 G/DL (ref 3.2–4.9)
ALBUMIN/GLOB SERPL: 1.7 G/DL
ALP SERPL-CCNC: 71 U/L (ref 30–99)
ALT SERPL-CCNC: 16 U/L (ref 2–50)
AMORPH CRY #/AREA URNS HPF: PRESENT /HPF
ANION GAP SERPL CALC-SCNC: 8 MMOL/L (ref 0–11.9)
APPEARANCE UR: CLEAR
AST SERPL-CCNC: 17 U/L (ref 12–45)
BACTERIA #/AREA URNS HPF: NEGATIVE /HPF
BASOPHILS # BLD AUTO: 0.5 % (ref 0–1.8)
BASOPHILS # BLD: 0.03 K/UL (ref 0–0.12)
BILIRUB SERPL-MCNC: 0.4 MG/DL (ref 0.1–1.5)
BILIRUB UR QL STRIP.AUTO: NEGATIVE
BUN SERPL-MCNC: 11 MG/DL (ref 8–22)
CALCIUM SERPL-MCNC: 9.3 MG/DL (ref 8.5–10.5)
CHLORIDE SERPL-SCNC: 105 MMOL/L (ref 96–112)
CHOLEST SERPL-MCNC: 259 MG/DL (ref 100–199)
CO2 SERPL-SCNC: 27 MMOL/L (ref 20–33)
COLOR UR: YELLOW
CREAT SERPL-MCNC: 0.63 MG/DL (ref 0.5–1.4)
EOSINOPHIL # BLD AUTO: 0.09 K/UL (ref 0–0.51)
EOSINOPHIL NFR BLD: 1.5 % (ref 0–6.9)
EPI CELLS #/AREA URNS HPF: ABNORMAL /HPF
ERYTHROCYTE [DISTWIDTH] IN BLOOD BY AUTOMATED COUNT: 45.3 FL (ref 35.9–50)
FASTING STATUS PATIENT QL REPORTED: NORMAL
FERRITIN SERPL-MCNC: 90.4 NG/ML (ref 10–291)
FOLATE SERPL-MCNC: 13.7 NG/ML
GLOBULIN SER CALC-MCNC: 2.7 G/DL (ref 1.9–3.5)
GLUCOSE SERPL-MCNC: 85 MG/DL (ref 65–99)
GLUCOSE UR STRIP.AUTO-MCNC: NEGATIVE MG/DL
HCT VFR BLD AUTO: 41.9 % (ref 37–47)
HDLC SERPL-MCNC: 77 MG/DL
HGB BLD-MCNC: 13.6 G/DL (ref 12–16)
HYALINE CASTS #/AREA URNS LPF: ABNORMAL /LPF
IMM GRANULOCYTES # BLD AUTO: 0.01 K/UL (ref 0–0.11)
IMM GRANULOCYTES NFR BLD AUTO: 0.2 % (ref 0–0.9)
IRON SATN MFR SERPL: 20 % (ref 15–55)
IRON SERPL-MCNC: 92 UG/DL (ref 40–170)
KETONES UR STRIP.AUTO-MCNC: ABNORMAL MG/DL
LDLC SERPL CALC-MCNC: 150 MG/DL
LEUKOCYTE ESTERASE UR QL STRIP.AUTO: ABNORMAL
LYMPHOCYTES # BLD AUTO: 2.42 K/UL (ref 1–4.8)
LYMPHOCYTES NFR BLD: 41.6 % (ref 22–41)
MCH RBC QN AUTO: 31.6 PG (ref 27–33)
MCHC RBC AUTO-ENTMCNC: 32.5 G/DL (ref 33.6–35)
MCV RBC AUTO: 97.2 FL (ref 81.4–97.8)
MICRO URNS: ABNORMAL
MONOCYTES # BLD AUTO: 0.55 K/UL (ref 0–0.85)
MONOCYTES NFR BLD AUTO: 9.5 % (ref 0–13.4)
NEUTROPHILS # BLD AUTO: 2.72 K/UL (ref 2–7.15)
NEUTROPHILS NFR BLD: 46.7 % (ref 44–72)
NITRITE UR QL STRIP.AUTO: NEGATIVE
NRBC # BLD AUTO: 0 K/UL
NRBC BLD-RTO: 0 /100 WBC
PH UR STRIP.AUTO: 6.5 [PH]
PLATELET # BLD AUTO: 324 K/UL (ref 164–446)
PMV BLD AUTO: 10.4 FL (ref 9–12.9)
POTASSIUM SERPL-SCNC: 4 MMOL/L (ref 3.6–5.5)
PROT SERPL-MCNC: 7.2 G/DL (ref 6–8.2)
PROT UR QL STRIP: NEGATIVE MG/DL
RBC # BLD AUTO: 4.31 M/UL (ref 4.2–5.4)
RBC # URNS HPF: ABNORMAL /HPF
RBC UR QL AUTO: NEGATIVE
SODIUM SERPL-SCNC: 140 MMOL/L (ref 135–145)
SP GR UR STRIP.AUTO: 1.01
TIBC SERPL-MCNC: 469 UG/DL (ref 250–450)
TRANSFERRIN SERPL-MCNC: 352 MG/DL (ref 200–370)
TRIGL SERPL-MCNC: 160 MG/DL (ref 0–149)
TSH SERPL DL<=0.005 MIU/L-ACNC: 3.91 UIU/ML (ref 0.38–5.33)
UROBILINOGEN UR STRIP.AUTO-MCNC: 0.2 MG/DL
VIT B12 SERPL-MCNC: 222 PG/ML (ref 211–911)
WBC # BLD AUTO: 5.8 K/UL (ref 4.8–10.8)
WBC #/AREA URNS HPF: ABNORMAL /HPF

## 2019-02-14 PROCEDURE — 36415 COLL VENOUS BLD VENIPUNCTURE: CPT

## 2019-02-14 PROCEDURE — 82746 ASSAY OF FOLIC ACID SERUM: CPT

## 2019-02-14 PROCEDURE — 82728 ASSAY OF FERRITIN: CPT

## 2019-02-14 PROCEDURE — 81001 URINALYSIS AUTO W/SCOPE: CPT

## 2019-02-14 PROCEDURE — 83550 IRON BINDING TEST: CPT

## 2019-02-14 PROCEDURE — 80061 LIPID PANEL: CPT

## 2019-02-14 PROCEDURE — 80053 COMPREHEN METABOLIC PANEL: CPT

## 2019-02-14 PROCEDURE — 82306 VITAMIN D 25 HYDROXY: CPT

## 2019-02-14 PROCEDURE — 83540 ASSAY OF IRON: CPT

## 2019-02-14 PROCEDURE — 85025 COMPLETE CBC W/AUTO DIFF WBC: CPT

## 2019-02-14 PROCEDURE — 84443 ASSAY THYROID STIM HORMONE: CPT

## 2019-02-14 PROCEDURE — 84466 ASSAY OF TRANSFERRIN: CPT

## 2019-02-14 PROCEDURE — 82607 VITAMIN B-12: CPT

## 2019-03-01 ENCOUNTER — APPOINTMENT (OUTPATIENT)
Dept: RADIOLOGY | Facility: MEDICAL CENTER | Age: 64
End: 2019-03-01
Attending: INTERNAL MEDICINE
Payer: COMMERCIAL

## 2020-09-24 ENCOUNTER — APPOINTMENT (RX ONLY)
Dept: URBAN - METROPOLITAN AREA CLINIC 36 | Facility: CLINIC | Age: 65
Setting detail: DERMATOLOGY
End: 2020-09-24

## 2020-09-24 PROBLEM — C44.1192 BASAL CELL CARCINOMA OF SKIN OF LEFT LOWER EYELID, INCLUDING CANTHUS: Status: ACTIVE | Noted: 2020-09-24

## 2020-09-24 PROCEDURE — 17311 MOHS 1 STAGE H/N/HF/G: CPT

## 2020-09-24 PROCEDURE — ? MOHS SURGERY

## 2020-09-24 PROCEDURE — 17312 MOHS ADDL STAGE: CPT

## 2020-09-24 PROCEDURE — 14060 TIS TRNFR E/N/E/L 10 SQ CM/<: CPT

## 2020-09-24 NOTE — PROCEDURE: MOHS SURGERY
Mohs Case Number: 
Previous Accession (Optional): [id number]
Biopsy Photograph Reviewed: Yes
Referring Physician (Optional): Missy
Consent Type: Consent 1 (Standard)
Eye Shield Used: No
Initial Size Of Lesion: 0.4
X Size Of Lesion In Cm (Optional): 0.3
Number Of Stages: 3
Primary Defect Length In Cm (Final Defect Size - Required For Flaps/Grafts): 0.9
Primary Defect Width In Cm (Final Defect Size - Required For Flaps/Grafts): 0.8
Repair Type: Flap
Oculoplastic Surgeon (A): Nathaniel
Oculoplastic Surgeon Procedure Text (A): After obtaining clear surgical margins the patient was sent to oculoplastics for surgical repair.  The patient understands they will receive post-surgical care and follow-up from the referring physician's office.
Otolaryngologist Procedure Text (A): After obtaining clear surgical margins the patient was sent to otolaryngology for surgical repair.  The patient understands they will receive post-surgical care and follow-up from the referring physician's office.
Plastic Surgeon Procedure Text (A): After obtaining clear surgical margins the patient was sent to plastics for surgical repair.  The patient understands they will receive post-surgical care and follow-up from the referring physician's office.
Mid-Level Procedure Text (A): After obtaining clear surgical margins the patient was sent to a mid-level provider for surgical repair.  The patient understands they will receive post-surgical care and follow-up from the mid-level provider.
Provider Procedure Text (A): After obtaining clear surgical margins the defect was repaired by another provider.
Asc Procedure Text (A): After obtaining clear surgical margins the patient was sent to an ASC for surgical repair.  The patient understands they will receive post-surgical care and follow-up from the ASC physician.
Suturegard Retention Suture: 2-0 Nylon
Retention Suture Bite Size: 3 mm
Length To Time In Minutes Device Was In Place: 10
Number Of Hemigard Strips Per Side: 1
Simple / Intermediate / Complex Repair - Final Wound Length In Cm: 0
Undermining Type: Entire Wound
Debridement Text: The wound edges were debrided prior to proceeding with the closure to facilitate wound healing.
Helical Rim Text: The closure involved the helical rim.
Vermilion Border Text: The closure involved the vermilion border.
Nostril Rim Text: The closure involved the nostril rim.
Retention Suture Text: Retention sutures were placed to support the closure and prevent dehiscence.
Flap Type: Mucosal Advancement Flap
Secondary Defect Length In Cm (Required For Flaps): 1.2
Area H Indication Text: Tumors in this location are included in Area H (eyelids, eyebrows, nose, lips, chin, ear, pre-auricular, post-auricular, temple, genitalia, hands, feet, ankles and areola).  Tissue conservation is critical in these anatomic locations.
Area M Indication Text: Tumors in this location are included in Area M (cheek, forehead, scalp, neck, jawline and pretibial skin).  Mohs surgery is indicated for tumors in these anatomic locations.
Area L Indication Text: Tumors in this location are included in Area L (trunk and extremities).  Mohs surgery is indicated for larger tumors, or tumors with aggressive histologic features, in these anatomic locations.
Surgical Defect Length In Cm (Optional): 0.5
Special Stains Stage 1 - Results: Base On Clearance Noted Above
Stage 2: Additional Anesthesia Type: 1% lidocaine with 1:100,000 epinephrine and 408mcg clindamycin/ml and a 1:10 solution of 8.4% sodium bicarbonate
Surgical Defect Length In Cm (Optional): 0.7
Surgical Defect Width In Cm (Optional): 0.6
Stage 4: Additional Anesthesia Type: 1% lidocaine with epinephrine
Include Tumor Staging In Mohs Note?: Please Select the Appropriate Response
Staging Info: By selecting yes to the question above you will include information on AJCC 8 tumor staging in your Mohs note. Information on tumor staging will be automatically added for SCCs on the head and neck. AJCC 8 includes tumor size, tumor depth, perineural involvement and bone invasion.
Tumor Depth: Less than 6mm from granular layer and no invasion beyond the subcutaneous fat
Medical Necessity Statement: Based on my medical judgement, Mohs surgery is the most appropriate treatment for this cancer compared to other treatments.
Alternatives Discussed Intro (Do Not Add Period): I discussed alternative treatments to Mohs surgery and specifically discussed the risks and benefits of
Consent 1/Introductory Paragraph: The rationale for Mohs was explained to the patient and consent was obtained. The risks, benefits and alternatives to therapy were discussed in detail. Specifically, the risks of infection, scarring, bleeding, prolonged wound healing, incomplete removal, allergy to anesthesia, nerve injury and recurrence were addressed. Prior to the procedure, the treatment site was clearly identified and confirmed by the patient. All components of Universal Protocol/PAUSE Rule completed.
Consent 2/Introductory Paragraph: Mohs surgery was explained to the patient and consent was obtained. The risks, benefits and alternatives to therapy were discussed in detail. Specifically, the risks of infection, scarring, bleeding, prolonged wound healing, incomplete removal, allergy to anesthesia, nerve injury and recurrence were addressed. Prior to the procedure, the treatment site was clearly identified and confirmed by the patient. All components of Universal Protocol/PAUSE Rule completed.
Consent 3/Introductory Paragraph: I gave the patient a chance to ask questions they had about the procedure.  Following this I explained the Mohs procedure and consent was obtained. The risks, benefits and alternatives to therapy were discussed in detail. Specifically, the risks of infection, scarring, bleeding, prolonged wound healing, incomplete removal, allergy to anesthesia, nerve injury and recurrence were addressed. Prior to the procedure, the treatment site was clearly identified and confirmed by the patient. All components of Universal Protocol/PAUSE Rule completed.
Consent (Temporal Branch)/Introductory Paragraph: The rationale for Mohs was explained to the patient and consent was obtained. The risks, benefits and alternatives to therapy were discussed in detail. Specifically, the risks of damage to the temporal branch of the facial nerve, infection, scarring, bleeding, prolonged wound healing, incomplete removal, allergy to anesthesia, and recurrence were addressed. Prior to the procedure, the treatment site was clearly identified and confirmed by the patient. All components of Universal Protocol/PAUSE Rule completed.
Consent (Marginal Mandibular)/Introductory Paragraph: The rationale for Mohs was explained to the patient and consent was obtained. The risks, benefits and alternatives to therapy were discussed in detail. Specifically, the risks of damage to the marginal mandibular branch of the facial nerve, infection, scarring, bleeding, prolonged wound healing, incomplete removal, allergy to anesthesia, and recurrence were addressed. Prior to the procedure, the treatment site was clearly identified and confirmed by the patient. All components of Universal Protocol/PAUSE Rule completed.
Consent (Spinal Accessory)/Introductory Paragraph: The rationale for Mohs was explained to the patient and consent was obtained. The risks, benefits and alternatives to therapy were discussed in detail. Specifically, the risks of damage to the spinal accessory nerve, infection, scarring, bleeding, prolonged wound healing, incomplete removal, allergy to anesthesia, and recurrence were addressed. Prior to the procedure, the treatment site was clearly identified and confirmed by the patient. All components of Universal Protocol/PAUSE Rule completed.
Consent (Near Eyelid Margin)/Introductory Paragraph: The rationale for Mohs was explained to the patient and consent was obtained. The risks, benefits and alternatives to therapy were discussed in detail. Specifically, the risks of ectropion or eyelid deformity, infection, scarring, bleeding, prolonged wound healing, incomplete removal, allergy to anesthesia, nerve injury and recurrence were addressed. Prior to the procedure, the treatment site was clearly identified and confirmed by the patient. All components of Universal Protocol/PAUSE Rule completed.
Consent (Ear)/Introductory Paragraph: The rationale for Mohs was explained to the patient and consent was obtained. The risks, benefits and alternatives to therapy were discussed in detail. Specifically, the risks of ear deformity, infection, scarring, bleeding, prolonged wound healing, incomplete removal, allergy to anesthesia, nerve injury and recurrence were addressed. Prior to the procedure, the treatment site was clearly identified and confirmed by the patient. All components of Universal Protocol/PAUSE Rule completed.
Consent (Nose)/Introductory Paragraph: The rationale for Mohs was explained to the patient and consent was obtained. The risks, benefits and alternatives to therapy were discussed in detail. Specifically, the risks of nasal deformity, changes in the flow of air through the nose, infection, scarring, bleeding, prolonged wound healing, incomplete removal, allergy to anesthesia, nerve injury and recurrence were addressed. Prior to the procedure, the treatment site was clearly identified and confirmed by the patient. All components of Universal Protocol/PAUSE Rule completed.
Consent (Lip)/Introductory Paragraph: The rationale for Mohs was explained to the patient and consent was obtained. The risks, benefits and alternatives to therapy were discussed in detail. Specifically, the risks of lip deformity, changes in the oral aperture, infection, scarring, bleeding, prolonged wound healing, incomplete removal, allergy to anesthesia, nerve injury and recurrence were addressed. Prior to the procedure, the treatment site was clearly identified and confirmed by the patient. All components of Universal Protocol/PAUSE Rule completed.
Consent (Scalp)/Introductory Paragraph: The rationale for Mohs was explained to the patient and consent was obtained. The risks, benefits and alternatives to therapy were discussed in detail. Specifically, the risks of changes in hair growth pattern secondary to repair, infection, scarring, bleeding, prolonged wound healing, incomplete removal, allergy to anesthesia, nerve injury and recurrence were addressed. Prior to the procedure, the treatment site was clearly identified and confirmed by the patient. All components of Universal Protocol/PAUSE Rule completed.
Detail Level: Detailed
Postop Diagnosis: same
Anesthesia Type: 0.5% lidocaine with 1:200,000 epinephrine and a 1:10 solution of 8.4% sodium bicarbonate and 408mcg clindamycin/ml
Anesthesia Volume In Cc: 1.8
Additional Anesthesia Volume In Cc: 6
Hemostasis: Electrocautery
Estimated Blood Loss (Cc): less than 5 cc
Repair Anesthesia Method: local infiltration
Deep Sutures: 6-0 Vicryl
Epidermal Sutures: 6-0 Silk
Epidermal Closure: vertical mattress
Additional Epidermal Closure (Optional): simple interrupted
Suturegard Intro: Intraoperative tissue expansion was performed, utilizing the SUTUREGARD device, in order to reduce wound tension.
Suturegard Body: The suture ends were repeatedly re-tightened and re-clamped to achieve the desired tissue expansion.
Hemigard Intro: Due to skin fragility and wound tension, it was decided to use HEMIGARD adhesive retention suture devices to permit a linear closure. The skin was cleaned and dried for a 6cm distance away from the wound. Excessive hair, if present, was removed to allow for adhesion.
Hemigard Postcare Instructions: The HEMIGARD strips are to remain completely dry for at least 5-7 days.
Donor Site Anesthesia Type: same as repair anesthesia
Graft Basting Suture (Optional): 5-0 Fast Absorbing Gut
Graft Donor Site Epidermal Sutures (Optional): 5-0 Ethibond
Graft Donor Site Bandage (Optional-Leave Blank If You Don't Want In Note): Aquaphor and telefa placed on wound. Pressure dressing applied to donor site
Closure 2 Information: This tab is for additional flaps and grafts, including complex repair and grafts and complex repair and flaps. You can also specify a different location for the additional defect, if the location is the same you do not need to select a new one. We will insert the automated text for the repair you select below just as we do for solitary flaps and grafts. Please note that at this time if you select a location with a different insurance zone you will need to override the ICD10 and CPT if appropriate.
Closure 3 Information: This tab is for additional flaps and grafts above and beyond our usual structured repairs.  Please note if you enter information here it will not currently bill and you will need to add the billing information manually.
Wound Care: Aquaphor
Dressing: dry sterile dressing
Wound Care (No Sutures): Petrolatum
Suture Removal: 7 days
Unna Boot Text: An Unna boot was placed to help immobilize the limb and facilitate more rapid healing.
Home Suture Removal Text: Patient was provided instructions on removing sutures and will remove their sutures at home.  If they have any questions or difficulties they will call the office.
Post-Care Instructions: I reviewed with the patient in detail post-care instructions. Patient is not to engage in any heavy lifting, exercise, or swimming for the next 14 days. Should the patient develop any fevers, chills, bleeding, severe pain patient will contact the office immediately.
Pain Refusal Text: I offered to prescribe pain medication but the patient refused to take this medication.
Mauc Instructions: By selecting yes to the question below the MAUC number will be added into the note.  This will be calculated automatically based on the diagnosis chosen, the size entered, the body zone selected (H,M,L) and the specific indications you chose. You will also have the option to override the Mohs AUC if you disagree with the automatically calculated number and this option is found in the Case Summary tab.
Where Do You Want The Question To Include Opioid Counseling Located?: Case Summary Tab
Eye Protection Verbiage: Before proceeding with the stage, a plastic scleral shield was inserted. The globe was anesthetized with a few drops of 1% lidocaine with 1:100,000 epinephrine. Then, an appropriate sized scleral shield was chosen and coated with lacrilube ointment. The shield was gently inserted and left in place for the duration of each stage. After the stage was completed, the shield was gently removed.
Mohs Method Verbiage: An incision at a 45 degree angle following the standard Mohs approach was done and the specimen was harvested as a microscopic controlled layer.
Surgeon/Pathologist Verbiage (Will Incorporate Name Of Surgeon From Intro If Not Blank): operated in two distinct and integrated capacities as the surgeon and pathologist.
Mohs Histo Method Verbiage: Each section was then chromacoded and processed in the Mohs lab using the Mohs protocol and submitted for frozen section.
Subsequent Stages Histo Method Verbiage: Using a similar technique to that described above, a thin layer of tissue was removed from all areas where tumor was visible on the previous stage.  The tissue was again oriented, mapped, dyed, and processed as above.
Mohs Rapid Report Verbiage: The area of clinically evident tumor was marked with skin marking ink and appropriately hatched.  The initial incision was made following the Mohs approach through the skin.  The specimen was taken to the lab, divided into the necessary number of pieces, chromacoded and processed according to the Mohs protocol.  This was repeated in successive stages until a tumor free defect was achieved.
Complex Repair Preamble Text (Leave Blank If You Do Not Want): Extensive wide undermining was performed at least 2 cm in all directions.
Intermediate Repair Preamble Text (Leave Blank If You Do Not Want): Undermining was performed with blunt dissection.
M-Plasty Complex Repair Preamble Text (Leave Blank If You Do Not Want): Extensive wide undermining was performed.
Non-Graft Cartilage Fenestration Text: The cartilage was fenestrated with a 2mm punch biopsy to help facilitate healing.
Graft Cartilage Fenestration Text: The cartilage was fenestrated with a 2mm punch biopsy to help facilitate graft survival and healing.
Secondary Intention Text (Leave Blank If You Do Not Want): The defect will heal with secondary intention.
No Repair - Repaired With Adjacent Surgical Defect Text (Leave Blank If You Do Not Want): After obtaining clear surgical margins the defect was repaired concurrently with another surgical defect which was in close approximation.
Advancement Flap (Single) Text: The defect edges were debeveled with a #15 scalpel blade.  Given the location of the defect and the proximity to free margins a single advancement flap was deemed most appropriate.  Using a sterile surgical marker, an appropriate advancement flap was drawn incorporating the defect and placing the expected incisions within the relaxed skin tension lines where possible.    The area thus outlined was incised deep to adipose tissue with a #15 scalpel blade.  The skin margins were undermined to an appropriate distance in all directions utilizing iris scissors.
Advancement Flap (Double) Text: The defect edges were debeveled with a #15 scalpel blade.  Given the location of the defect and the proximity to free margins a double advancement flap was deemed most appropriate.  Using a sterile surgical marker, the appropriate advancement flaps were drawn incorporating the defect and placing the expected incisions within the relaxed skin tension lines where possible.    The area thus outlined was incised deep to adipose tissue with a #15 scalpel blade.  The skin margins were undermined to an appropriate distance in all directions utilizing iris scissors.
Burow's Advancement Flap Text: The defect edges were debeveled with a #15 scalpel blade.  Given the location of the defect and the proximity to free margins a Burow's advancement flap was deemed most appropriate.  Using a sterile surgical marker, the appropriate advancement flap was drawn incorporating the defect and placing the expected incisions within the relaxed skin tension lines where possible.    The area thus outlined was incised deep to adipose tissue with a #15 scalpel blade.  The skin margins were undermined to an appropriate distance in all directions utilizing iris scissors.
Chonodrocutaneous Helical Advancement Flap Text: The defect edges were debeveled with a #15 scalpel blade.  Given the location of the defect and the proximity to free margins a chondrocutaneous helical advancement flap was deemed most appropriate.  Using a sterile surgical marker, the appropriate advancement flap was drawn incorporating the defect and placing the expected incisions within the relaxed skin tension lines where possible.    The area thus outlined was incised deep to adipose tissue with a #15 scalpel blade.  The skin margins were undermined to an appropriate distance in all directions utilizing iris scissors.
Crescentic Advancement Flap Text: The defect edges were debeveled with a #15 scalpel blade.  Given the location of the defect and the proximity to free margins a crescentic advancement flap was deemed most appropriate.  Using a sterile surgical marker, the appropriate advancement flap was drawn incorporating the defect and placing the expected incisions within the relaxed skin tension lines where possible.    The area thus outlined was incised deep to adipose tissue with a #15 scalpel blade.  The skin margins were undermined to an appropriate distance in all directions utilizing iris scissors.
A-T Advancement Flap Text: The defect edges were debeveled with a #15 scalpel blade.  Given the location of the defect, shape of the defect and the proximity to free margins an A-T advancement flap was deemed most appropriate.  Using a sterile surgical marker, an appropriate advancement flap was drawn incorporating the defect and placing the expected incisions within the relaxed skin tension lines where possible.    The area thus outlined was incised deep to adipose tissue with a #15 scalpel blade.  The skin margins were undermined to an appropriate distance in all directions utilizing iris scissors.
O-T Advancement Flap Text: The defect edges were debeveled with a #15 scalpel blade.  Given the location of the defect, shape of the defect and the proximity to free margins an O-T advancement flap was deemed most appropriate.  Using a sterile surgical marker, an appropriate advancement flap was drawn incorporating the defect and placing the expected incisions within the relaxed skin tension lines where possible.    The area thus outlined was incised deep to adipose tissue with a #15 scalpel blade.  The skin margins were undermined to an appropriate distance in all directions utilizing iris scissors.
O-L Flap Text: The defect edges were debeveled with a #15 scalpel blade.  Given the location of the defect, shape of the defect and the proximity to free margins an O-L flap was deemed most appropriate.  Using a sterile surgical marker, an appropriate advancement flap was drawn incorporating the defect and placing the expected incisions within the relaxed skin tension lines where possible.    The area thus outlined was incised deep to adipose tissue with a #15 scalpel blade.  The skin margins were undermined to an appropriate distance in all directions utilizing iris scissors.
O-Z Flap Text: The defect edges were debeveled with a #15 scalpel blade.  Given the location of the defect, shape of the defect and the proximity to free margins an O-Z flap was deemed most appropriate.  Using a sterile surgical marker, an appropriate transposition flap was drawn incorporating the defect and placing the expected incisions within the relaxed skin tension lines where possible. The area thus outlined was incised deep to adipose tissue with a #15 scalpel blade.  The skin margins were undermined to an appropriate distance in all directions utilizing iris scissors.
Double O-Z Flap Text: The defect edges were debeveled with a #15 scalpel blade.  Given the location of the defect, shape of the defect and the proximity to free margins a Double O-Z flap was deemed most appropriate.  Using a sterile surgical marker, an appropriate transposition flap was drawn incorporating the defect and placing the expected incisions within the relaxed skin tension lines where possible. The area thus outlined was incised deep to adipose tissue with a #15 scalpel blade.  The skin margins were undermined to an appropriate distance in all directions utilizing iris scissors.
V-Y Flap Text: The defect edges were debeveled with a #15 scalpel blade.  Given the location of the defect, shape of the defect and the proximity to free margins a V-Y flap was deemed most appropriate.  Using a sterile surgical marker, an appropriate advancement flap was drawn incorporating the defect and placing the expected incisions within the relaxed skin tension lines where possible.    The area thus outlined was incised deep to adipose tissue with a #15 scalpel blade.  The skin margins were undermined to an appropriate distance in all directions utilizing iris scissors.
Advancement-Rotation Flap Text: The defect edges were debeveled with a #15 scalpel blade.  Given the location of the defect, shape of the defect and the proximity to free margins an advancement-rotation flap was deemed most appropriate.  Using a sterile surgical marker, an appropriate flap was drawn incorporating the defect and placing the expected incisions within the relaxed skin tension lines where possible. The area thus outlined was incised deep to adipose tissue with a #15 scalpel blade.  The skin margins were undermined to an appropriate distance in all directions utilizing iris scissors.
Mercedes Flap Text: The defect edges were debeveled with a #15 scalpel blade.  Given the location of the defect, shape of the defect and the proximity to free margins a Mercedes flap was deemed most appropriate.  Using a sterile surgical marker, an appropriate advancement flap was drawn incorporating the defect and placing the expected incisions within the relaxed skin tension lines where possible. The area thus outlined was incised deep to adipose tissue with a #15 scalpel blade.  The skin margins were undermined to an appropriate distance in all directions utilizing iris scissors.
Modified Advancement Flap Text: The defect edges were debeveled with a #15 scalpel blade.  Given the location of the defect, shape of the defect and the proximity to free margins a modified advancement flap was deemed most appropriate.  Using a sterile surgical marker, an appropriate advancement flap was drawn incorporating the defect and placing the expected incisions within the relaxed skin tension lines where possible.    The area thus outlined was incised deep to adipose tissue with a #15 scalpel blade.  The skin margins were undermined to an appropriate distance in all directions utilizing iris scissors.
Mucosal Advancement Flap Text: Given the location of the defect, shape of the defect and the proximity to free margins a mucosal advancement flap was deemed most appropriate. Incisions were made with a 15 blade scalpel in the appropriate fashion along the cutaneous vermilion border and the mucosal lip. The remaining actinically damaged mucosal tissue was excised.  The mucosal advancement flap was then elevated to the gingival sulcus with care taken to preserve the neurovascular structures and advanced into the primary defect. Care was taken to ensure that precise realignment of the vermilion border was achieved.
Peng Advancement Flap Text: The defect edges were debeveled with a #15 scalpel blade.  Given the location of the defect, shape of the defect and the proximity to free margins a Peng advancement flap was deemed most appropriate.  Using a sterile surgical marker, an appropriate advancement flap was drawn incorporating the defect and placing the expected incisions within the relaxed skin tension lines where possible. The area thus outlined was incised deep to adipose tissue with a #15 scalpel blade.  The skin margins were undermined to an appropriate distance in all directions utilizing iris scissors.
Hatchet Flap Text: The defect edges were debeveled with a #15 scalpel blade.  Given the location of the defect, shape of the defect and the proximity to free margins a hatchet flap based from the glabella was deemed most appropriate.  Using a sterile surgical marker, an appropriate glabellar hatchet flap was drawn incorporating the defect and placing the expected incisions within the relaxed skin tension lines where possible.    The area thus outlined was incised deep to adipose tissue with a #15 scalpel blade.  The skin margins were undermined to an appropriate distance in all directions utilizing iris scissors.
Rotation Flap Text: The defect edges were debeveled with a #15 scalpel blade.  Given the location of the defect, shape of the defect and the proximity to free margins a rotation flap was deemed most appropriate.  Using a sterile surgical marker, an appropriate rotation flap was drawn incorporating the defect and placing the expected incisions within the relaxed skin tension lines where possible.    The area thus outlined was incised deep to adipose tissue with a #15 scalpel blade.  The skin margins were undermined to an appropriate distance in all directions utilizing iris scissors.
Spiral Flap Text: The defect edges were debeveled with a #15 scalpel blade.  Given the location of the defect, shape of the defect and the proximity to free margins a spiral flap was deemed most appropriate.  Using a sterile surgical marker, an appropriate rotation flap was drawn incorporating the defect and placing the expected incisions within the relaxed skin tension lines where possible. The area thus outlined was incised deep to adipose tissue with a #15 scalpel blade.  The skin margins were undermined to an appropriate distance in all directions utilizing iris scissors.
Star Wedge Flap Text: The defect edges were debeveled with a #15 scalpel blade.  Given the location of the defect, shape of the defect and the proximity to free margins a star wedge flap was deemed most appropriate.  Using a sterile surgical marker, an appropriate rotation flap was drawn incorporating the defect and placing the expected incisions within the relaxed skin tension lines where possible. The area thus outlined was incised deep to adipose tissue with a #15 scalpel blade.  The skin margins were undermined to an appropriate distance in all directions utilizing iris scissors.
Transposition Flap Text: The defect edges were debeveled with a #15 scalpel blade.  Given the location of the defect and the proximity to free margins a transposition flap was deemed most appropriate.  Using a sterile surgical marker, an appropriate transposition flap was drawn incorporating the defect.    The area thus outlined was incised deep to adipose tissue with a #15 scalpel blade.  The skin margins were undermined to an appropriate distance in all directions utilizing iris scissors.
Muscle Hinge Flap Text: The defect edges were debeveled with a #15 scalpel blade.  Given the size, depth and location of the defect and the proximity to free margins a muscle hinge flap was deemed most appropriate.  Using a sterile surgical marker, an appropriate hinge flap was drawn incorporating the defect. The area thus outlined was incised with a #15 scalpel blade.  The skin margins were undermined to an appropriate distance in all directions utilizing iris scissors.
Melolabial Transposition Flap Text: The defect edges were debeveled with a #15 scalpel blade.  Given the location of the defect and the proximity to free margins a melolabial flap was deemed most appropriate.  Using a sterile surgical marker, an appropriate melolabial transposition flap was drawn incorporating the defect.    The area thus outlined was incised deep to adipose tissue with a #15 scalpel blade.  The skin margins were undermined to an appropriate distance in all directions utilizing iris scissors.
Rhombic Flap Text: The defect edges were debeveled with a #15 scalpel blade.  Given the location of the defect and the proximity to free margins a rhombic flap was deemed most appropriate.  Using a sterile surgical marker, an appropriate rhombic flap was drawn incorporating the defect.    The area thus outlined was incised deep to adipose tissue with a #15 scalpel blade.  The skin margins were undermined to an appropriate distance in all directions utilizing iris scissors.
Rhomboid Transposition Flap Text: The defect edges were debeveled with a #15 scalpel blade.  Given the location of the defect and the proximity to free margins a rhomboid transposition flap was deemed most appropriate.  Using a sterile surgical marker, an appropriate rhomboid flap was drawn incorporating the defect.    The area thus outlined was incised deep to adipose tissue with a #15 scalpel blade.  The skin margins were undermined to an appropriate distance in all directions utilizing iris scissors.
Bi-Rhombic Flap Text: The defect edges were debeveled with a #15 scalpel blade.  Given the location of the defect and the proximity to free margins a bi-rhombic flap was deemed most appropriate.  Using a sterile surgical marker, an appropriate rhombic flap was drawn incorporating the defect. The area thus outlined was incised deep to adipose tissue with a #15 scalpel blade.  The skin margins were undermined to an appropriate distance in all directions utilizing iris scissors.
Helical Rim Advancement Flap Text: The defect edges were debeveled with a #15 blade scalpel.  Given the location of the defect and the proximity to free margins (helical rim) a double helical rim advancement flap was deemed most appropriate.  Using a sterile surgical marker, the appropriate advancement flaps were drawn incorporating the defect and placing the expected incisions between the helical rim and antihelix where possible.  The area thus outlined was incised through and through with a #15 scalpel blade.  With a skin hook and iris scissors, the flaps were gently and sharply undermined and freed up.
Bilateral Helical Rim Advancement Flap Text: The defect edges were debeveled with a #15 blade scalpel.  Given the location of the defect and the proximity to free margins (helical rim) a bilateral helical rim advancement flap was deemed most appropriate.  Using a sterile surgical marker, the appropriate advancement flaps were drawn incorporating the defect and placing the expected incisions between the helical rim and antihelix where possible.  The area thus outlined was incised through and through with a #15 scalpel blade.  With a skin hook and iris scissors, the flaps were gently and sharply undermined and freed up.
Ear Star Wedge Flap Text: The defect edges were debeveled with a #15 blade scalpel.  Given the location of the defect and the proximity to free margins (helical rim) an ear star wedge flap was deemed most appropriate.  Using a sterile surgical marker, the appropriate flap was drawn incorporating the defect and placing the expected incisions between the helical rim and antihelix where possible.  The area thus outlined was incised through and through with a #15 scalpel blade.
Banner Transposition Flap Text: The defect edges were debeveled with a #15 scalpel blade.  Given the location of the defect and the proximity to free margins a Banner transposition flap was deemed most appropriate.  Using a sterile surgical marker, an appropriate flap drawn around the defect. The area thus outlined was incised deep to adipose tissue with a #15 scalpel blade.  The skin margins were undermined to an appropriate distance in all directions utilizing iris scissors.
Bilobed Flap Text: The defect edges were debeveled with a #15 scalpel blade.  Given the location of the defect and the proximity to free margins a bilobe flap was deemed most appropriate.  Using a sterile surgical marker, an appropriate bilobe flap drawn around the defect.    The area thus outlined was incised deep to adipose tissue with a #15 scalpel blade.  The skin margins were undermined to an appropriate distance in all directions utilizing iris scissors.
Bilobed Transposition Flap Text: The defect edges were debeveled with a #15 scalpel blade.  Given the location of the defect and the proximity to free margins a bilobed transposition flap was deemed most appropriate.  Using a sterile surgical marker, an appropriate bilobe flap drawn around the defect.    The area thus outlined was incised deep to adipose tissue with a #15 scalpel blade.  The skin margins were undermined to an appropriate distance in all directions utilizing iris scissors.
Trilobed Flap Text: The defect edges were debeveled with a #15 scalpel blade.  Given the location of the defect and the proximity to free margins a trilobed flap was deemed most appropriate.  Using a sterile surgical marker, an appropriate trilobed flap drawn around the defect.    The area thus outlined was incised deep to adipose tissue with a #15 scalpel blade.  The skin margins were undermined to an appropriate distance in all directions utilizing iris scissors.
Dorsal Nasal Flap Text: The defect edges were debeveled with a #15 scalpel blade.  Given the location of the defect and the proximity to free margins a dorsal nasal flap,based upon the glabellar folds, was deemed most appropriate.  Using a sterile surgical marker, an appropriate dorsal nasal flap was drawn around the defect.    The area thus outlined was incised deep to adipose tissue with a #15 scalpel blade.  The skin margins were undermined to an appropriate distance in all directions utilizing iris scissors.
Island Pedicle Flap Text: The defect edges were debeveled with a #15 scalpel blade.  Given the location of the defect, shape of the defect and the proximity to free margins an island pedicle advancement flap was deemed most appropriate.  Using a sterile surgical marker, an appropriate advancement flap was drawn incorporating the defect, outlining the appropriate donor tissue and placing the expected incisions within the relaxed skin tension lines where possible.    The area thus outlined was incised deep to adipose tissue with a #15 scalpel blade.  The skin margins were undermined to an appropriate distance in all directions around the primary defect and laterally outward around the island pedicle utilizing iris scissors.  There was minimal undermining beneath the pedicle flap.
Island Pedicle Flap With Canthal Suspension Text: The defect edges were debeveled with a #15 scalpel blade.  Given the location of the defect, shape of the defect and the proximity to free margins an island pedicle advancement flap was deemed most appropriate.  Using a sterile surgical marker, an appropriate advancement flap was drawn incorporating the defect, outlining the appropriate donor tissue and placing the expected incisions within the relaxed skin tension lines where possible. The area thus outlined was incised deep to adipose tissue with a #15 scalpel blade.  The skin margins were undermined to an appropriate distance in all directions around the primary defect and laterally outward around the island pedicle utilizing iris scissors.  There was minimal undermining beneath the pedicle flap. A suspension suture was placed in the canthal tendon to prevent tension and prevent ectropion.
Alar Island Pedicle Flap Text: The defect edges were debeveled with a #15 scalpel blade.  Given the location of the defect, shape of the defect and the proximity to the alar rim an island pedicle advancement flap was deemed most appropriate.  Using a sterile surgical marker, an appropriate advancement flap was drawn incorporating the defect, outlining the appropriate donor tissue and placing the expected incisions within the nasal ala running parallel to the alar rim. The area thus outlined was incised with a #15 scalpel blade.  The skin margins were undermined minimally to an appropriate distance in all directions around the primary defect and laterally outward around the island pedicle utilizing iris scissors.  There was minimal undermining beneath the pedicle flap.
Double Island Pedicle Flap Text: The defect edges were debeveled with a #15 scalpel blade.  Given the location of the defect, shape of the defect and the proximity to free margins a double island pedicle advancement flap was deemed most appropriate.  Using a sterile surgical marker, an appropriate advancement flap was drawn incorporating the defect, outlining the appropriate donor tissue and placing the expected incisions within the relaxed skin tension lines where possible.    The area thus outlined was incised deep to adipose tissue with a #15 scalpel blade.  The skin margins were undermined to an appropriate distance in all directions around the primary defect and laterally outward around the island pedicle utilizing iris scissors.  There was minimal undermining beneath the pedicle flap.
Island Pedicle Flap-Requiring Vessel Identification Text: The defect edges were debeveled with a #15 scalpel blade.  Given the location of the defect, shape of the defect and the proximity to free margins an island pedicle advancement flap was deemed most appropriate.  Using a sterile surgical marker, an appropriate advancement flap was drawn, based on the axial vessel mentioned above, incorporating the defect, outlining the appropriate donor tissue and placing the expected incisions within the relaxed skin tension lines where possible.    The area thus outlined was incised deep to adipose tissue with a #15 scalpel blade.  The skin margins were undermined to an appropriate distance in all directions around the primary defect and laterally outward around the island pedicle utilizing iris scissors.  There was minimal undermining beneath the pedicle flap.
Keystone Flap Text: The defect edges were debeveled with a #15 scalpel blade.  Given the location of the defect, shape of the defect a keystone flap was deemed most appropriate.  Using a sterile surgical marker, an appropriate keystone flap was drawn incorporating the defect, outlining the appropriate donor tissue and placing the expected incisions within the relaxed skin tension lines where possible. The area thus outlined was incised deep to adipose tissue with a #15 scalpel blade.  The skin margins were undermined to an appropriate distance in all directions around the primary defect and laterally outward around the flap utilizing iris scissors.
O-T Plasty Text: The defect edges were debeveled with a #15 scalpel blade.  Given the location of the defect, shape of the defect and the proximity to free margins an O-T plasty was deemed most appropriate.  Using a sterile surgical marker, an appropriate O-T plasty was drawn incorporating the defect and placing the expected incisions within the relaxed skin tension lines where possible.    The area thus outlined was incised deep to adipose tissue with a #15 scalpel blade.  The skin margins were undermined to an appropriate distance in all directions utilizing iris scissors.
O-Z Plasty Text: The defect edges were debeveled with a #15 scalpel blade.  Given the location of the defect, shape of the defect and the proximity to free margins an O-Z plasty (double transposition flap) was deemed most appropriate.  Using a sterile surgical marker, the appropriate transposition flaps were drawn incorporating the defect and placing the expected incisions within the relaxed skin tension lines where possible.    The area thus outlined was incised deep to adipose tissue with a #15 scalpel blade.  The skin margins were undermined to an appropriate distance in all directions utilizing iris scissors.  Hemostasis was achieved with electrocautery.  The flaps were then transposed into place, one clockwise and the other counterclockwise, and anchored with interrupted buried subcutaneous sutures.
Double O-Z Plasty Text: The defect edges were debeveled with a #15 scalpel blade.  Given the location of the defect, shape of the defect and the proximity to free margins a Double O-Z plasty (double transposition flap) was deemed most appropriate.  Using a sterile surgical marker, the appropriate transposition flaps were drawn incorporating the defect and placing the expected incisions within the relaxed skin tension lines where possible. The area thus outlined was incised deep to adipose tissue with a #15 scalpel blade.  The skin margins were undermined to an appropriate distance in all directions utilizing iris scissors.  Hemostasis was achieved with electrocautery.  The flaps were then transposed into place, one clockwise and the other counterclockwise, and anchored with interrupted buried subcutaneous sutures.
V-Y Plasty Text: The defect edges were debeveled with a #15 scalpel blade.  Given the location of the defect, shape of the defect and the proximity to free margins an V-Y advancement flap was deemed most appropriate.  Using a sterile surgical marker, an appropriate advancement flap was drawn incorporating the defect and placing the expected incisions within the relaxed skin tension lines where possible.    The area thus outlined was incised deep to adipose tissue with a #15 scalpel blade.  The skin margins were undermined to an appropriate distance in all directions utilizing iris scissors.
H Plasty Text: Given the location of the defect, shape of the defect and the proximity to free margins a H-plasty was deemed most appropriate for repair.  Using a sterile surgical marker, the appropriate advancement arms of the H-plasty were drawn incorporating the defect and placing the expected incisions within the relaxed skin tension lines where possible. The area thus outlined was incised deep to adipose tissue with a #15 scalpel blade. The skin margins were undermined to an appropriate distance in all directions utilizing iris scissors.  The opposing advancement arms were then advanced into place in opposite direction and anchored with interrupted buried subcutaneous sutures.
W Plasty Text: The lesion was extirpated to the level of the fat with a #15 scalpel blade.  Given the location of the defect, shape of the defect and the proximity to free margins a W-plasty was deemed most appropriate for repair.  Using a sterile surgical marker, the appropriate transposition arms of the W-plasty were drawn incorporating the defect and placing the expected incisions within the relaxed skin tension lines where possible.    The area thus outlined was incised deep to adipose tissue with a #15 scalpel blade.  The skin margins were undermined to an appropriate distance in all directions utilizing iris scissors.  The opposing transposition arms were then transposed into place in opposite direction and anchored with interrupted buried subcutaneous sutures.
Z Plasty Text: The lesion was extirpated to the level of the fat with a #15 scalpel blade.  Given the location of the defect, shape of the defect and the proximity to free margins a Z-plasty was deemed most appropriate for repair.  Using a sterile surgical marker, the appropriate transposition arms of the Z-plasty were drawn incorporating the defect and placing the expected incisions within the relaxed skin tension lines where possible.    The area thus outlined was incised deep to adipose tissue with a #15 scalpel blade.  The skin margins were undermined to an appropriate distance in all directions utilizing iris scissors.  The opposing transposition arms were then transposed into place in opposite direction and anchored with interrupted buried subcutaneous sutures.
Zygomaticofacial Flap Text: Given the location of the defect, shape of the defect and the proximity to free margins a zygomaticofacial flap was deemed most appropriate for repair.  Using a sterile surgical marker, the appropriate flap was drawn incorporating the defect and placing the expected incisions within the relaxed skin tension lines where possible. The area thus outlined was incised deep to adipose tissue with a #15 scalpel blade with preservation of a vascular pedicle.  The skin margins were undermined to an appropriate distance in all directions utilizing iris scissors.  The flap was then placed into the defect and anchored with interrupted buried subcutaneous sutures.
Cheek Interpolation Flap Text: A decision was made to reconstruct the defect utilizing an interpolation axial flap and a staged reconstruction.  A telfa template was made of the defect.  This telfa template was then used to outline the Cheek Interpolation flap.  The donor area for the pedicle flap was then injected with anesthesia.  The flap was excised through the skin and subcutaneous tissue down to the layer of the underlying musculature.  The interpolation flap was carefully excised within this deep plane to maintain its blood supply.  The edges of the donor site were undermined.   The donor site was closed in a primary fashion.  The pedicle was then rotated into position and sutured.  Once the tube was sutured into place, adequate blood supply was confirmed with blanching and refill.  The pedicle was then wrapped with xeroform gauze and dressed appropriately with a telfa and gauze bandage to ensure continued blood supply and protect the attached pedicle.
Cheek-To-Nose Interpolation Flap Text: A decision was made to reconstruct the defect utilizing an interpolation axial flap and a staged reconstruction.  A telfa template was made of the defect.  This telfa template was then used to outline the Cheek-To-Nose Interpolation flap.  The donor area for the pedicle flap was then injected with anesthesia.  The flap was excised through the skin and subcutaneous tissue down to the layer of the underlying musculature.  The interpolation flap was carefully excised within this deep plane to maintain its blood supply.  The edges of the donor site were undermined.   The donor site was closed in a primary fashion.  The pedicle was then rotated into position and sutured.  Once the tube was sutured into place, adequate blood supply was confirmed with blanching and refill.  The pedicle was then wrapped with xeroform gauze and dressed appropriately with a telfa and gauze bandage to ensure continued blood supply and protect the attached pedicle.
Interpolation Flap Text: A decision was made to reconstruct the defect utilizing an interpolation axial flap and a staged reconstruction.  A telfa template was made of the defect.  This telfa template was then used to outline the interpolation flap.  The donor area for the pedicle flap was then injected with anesthesia.  The flap was excised through the skin and subcutaneous tissue down to the layer of the underlying musculature.  The interpolation flap was carefully excised within this deep plane to maintain its blood supply.  The edges of the donor site were undermined.   The donor site was closed in a primary fashion.  The pedicle was then rotated into position and sutured.  Once the tube was sutured into place, adequate blood supply was confirmed with blanching and refill.  The pedicle was then wrapped with xeroform gauze and dressed appropriately with a telfa and gauze bandage to ensure continued blood supply and protect the attached pedicle.
Melolabial Interpolation Flap Text: A decision was made to reconstruct the defect utilizing an interpolation axial flap and a staged reconstruction.  A telfa template was made of the defect.  This telfa template was then used to outline the melolabial interpolation flap.  The donor area for the pedicle flap was then injected with anesthesia.  The flap was excised through the skin and subcutaneous tissue down to the layer of the underlying musculature.  The pedicle flap was carefully excised within this deep plane to maintain its blood supply.  The edges of the donor site were undermined.   The donor site was closed in a primary fashion.  The pedicle was then rotated into position and sutured.  Once the tube was sutured into place, adequate blood supply was confirmed with blanching and refill.  The pedicle was then wrapped with xeroform gauze and dressed appropriately with a telfa and gauze bandage to ensure continued blood supply and protect the attached pedicle.
Mastoid Interpolation Flap Text: A decision was made to reconstruct the defect utilizing an interpolation axial flap and a staged reconstruction.  A telfa template was made of the defect.  This telfa template was then used to outline the mastoid interpolation flap.  The donor area for the pedicle flap was then injected with anesthesia.  The flap was excised through the skin and subcutaneous tissue down to the layer of the underlying musculature.  The pedicle flap was carefully excised within this deep plane to maintain its blood supply.  The edges of the donor site were undermined.   The donor site was closed in a primary fashion.  The pedicle was then rotated into position and sutured.  Once the tube was sutured into place, adequate blood supply was confirmed with blanching and refill.  The pedicle was then wrapped with xeroform gauze and dressed appropriately with a telfa and gauze bandage to ensure continued blood supply and protect the attached pedicle.
Posterior Auricular Interpolation Flap Text: A decision was made to reconstruct the defect utilizing an interpolation axial flap and a staged reconstruction.  A telfa template was made of the defect.  This telfa template was then used to outline the posterior auricular interpolation flap.  The donor area for the pedicle flap was then injected with anesthesia.  The flap was excised through the skin and subcutaneous tissue down to the layer of the underlying musculature.  The pedicle flap was carefully excised within this deep plane to maintain its blood supply.  The edges of the donor site were undermined.   The donor site was closed in a primary fashion.  The pedicle was then rotated into position and sutured.  Once the tube was sutured into place, adequate blood supply was confirmed with blanching and refill.  The pedicle was then wrapped with xeroform gauze and dressed appropriately with a telfa and gauze bandage to ensure continued blood supply and protect the attached pedicle.
Paramedian Forehead Flap Text: A decision was made to reconstruct the defect utilizing an interpolation axial flap and a staged reconstruction.  A telfa template was made of the defect.  This telfa template was then used to outline the paramedian forehead pedicle flap.  The donor area for the pedicle flap was then injected with anesthesia.  The flap was excised through the skin and subcutaneous tissue down to the layer of the underlying musculature.  The pedicle flap was carefully excised within this deep plane to maintain its blood supply.  The edges of the donor site were undermined.   The donor site was closed in a primary fashion.  The pedicle was then rotated into position and sutured.  Once the tube was sutured into place, adequate blood supply was confirmed with blanching and refill.  The pedicle was then wrapped with xeroform gauze and dressed appropriately with a telfa and gauze bandage to ensure continued blood supply and protect the attached pedicle.
Cheiloplasty (Less Than 50%) Text: A decision was made to reconstruct the defect with a  cheiloplasty.  The defect was undermined extensively.  Additional obicularis oris muscle was excised with a 15 blade scalpel.  The defect was converted into a full thickness wedge, of less than 50% of the vertical height of the lip, to facilite a better cosmetic result.  Small vessels were then tied off with 5-0 monocyrl. The obicularis oris, superficial fascia, adipose and dermis were then reapproximated.  After the deeper layers were approximated the epidermis was reapproximated with particular care given to realign the vermilion border.
Cheiloplasty (Complex) Text: A decision was made to reconstruct the defect with a  cheiloplasty.  The defect was undermined extensively.  Additional obicularis oris muscle was excised with a 15 blade scalpel.  The defect was converted into a full thickness wedge to facilite a better cosmetic result.  Small vessels were then tied off with 5-0 monocyrl. The obicularis oris, superficial fascia, adipose and dermis were then reapproximated.  After the deeper layers were approximated the epidermis was reapproximated with particular care given to realign the vermilion border.
Ear Wedge Repair Text: A wedge excision was completed by carrying down an excision through the full thickness of the ear and cartilage with an inward facing Burow's triangle. The wound was then closed in a layered fashion.
Full Thickness Lip Wedge Repair (Flap) Text: Given the location of the defect and the proximity to free margins a full thickness wedge repair was deemed most appropriate.  Using a sterile surgical marker, the appropriate repair was drawn incorporating the defect and placing the expected incisions perpendicular to the vermilion border.  The vermilion border was also meticulously outlined to ensure appropriate reapproximation during the repair.  The area thus outlined was incised through and through with a #15 scalpel blade.  The muscularis and dermis were reaproximated with deep sutures following hemostasis. Care was taken to realign the vermilion border before proceeding with the superficial closure.  Once the vermilion was realigned the superfical and mucosal closure was finished.
Ftsg Text: The defect edges were debeveled with a #15 scalpel blade.  Given the location of the defect, shape of the defect and the proximity to free margins a full thickness skin graft was deemed most appropriate.  Using a sterile surgical marker, the primary defect shape was transferred to the donor site. The area thus outlined was incised deep to adipose tissue with a #15 scalpel blade.  The harvested graft was then trimmed of adipose tissue until only dermis and epidermis was left.  The skin margins of the secondary defect were undermined to an appropriate distance in all directions utilizing iris scissors.  The secondary defect was closed with interrupted buried subcutaneous sutures.  The skin edges were then re-apposed with running  sutures.  The skin graft was then placed in the primary defect and oriented appropriately.
Split-Thickness Skin Graft Text: The defect edges were debeveled with a #15 scalpel blade.  Given the location of the defect, shape of the defect and the proximity to free margins a split thickness skin graft was deemed most appropriate.  Using a sterile surgical marker, the primary defect shape was transferred to the donor site. The split thickness graft was then harvested.  The skin graft was then placed in the primary defect and oriented appropriately.
Burow's Graft Text: The defect edges were debeveled with a #15 scalpel blade.  Given the location of the defect, shape of the defect, the proximity to free margins and the presence of a standing cone deformity a Burow's skin graft was deemed most appropriate. The standing cone was removed and this tissue was then trimmed to the shape of the primary defect. The adipose tissue was also removed until only dermis and epidermis were left.  The skin margins of the secondary defect were undermined to an appropriate distance in all directions utilizing iris scissors.  The secondary defect was closed with interrupted buried subcutaneous sutures.  The skin edges were then re-apposed with running  sutures.  The skin graft was then placed in the primary defect and oriented appropriately.
Cartilage Graft Text: The defect edges were debeveled with a #15 scalpel blade.  Given the location of the defect, shape of the defect, the fact the defect involved a full thickness cartilage defect a cartilage graft was deemed most appropriate.  An appropriate donor site was identified, cleansed, and anesthetized. The cartilage graft was then harvested and transferred to the recipient site, oriented appropriately and then sutured into place.  The secondary defect was then repaired using a primary closure.
Composite Graft Text: The defect edges were debeveled with a #15 scalpel blade.  Given the location of the defect, shape of the defect, the proximity to free margins and the fact the defect was full thickness a composite graft was deemed most appropriate.  The defect was outline and then transferred to the donor site.  A full thickness graft was then excised from the donor site. The graft was then placed in the primary defect, oriented appropriately and then sutured into place.  The secondary defect was then repaired using a primary closure.
Epidermal Autograft Text: The defect edges were debeveled with a #15 scalpel blade.  Given the location of the defect, shape of the defect and the proximity to free margins an epidermal autograft was deemed most appropriate.  Using a sterile surgical marker, the primary defect shape was transferred to the donor site. The epidermal graft was then harvested.  The skin graft was then placed in the primary defect and oriented appropriately.
Dermal Autograft Text: The defect edges were debeveled with a #15 scalpel blade.  Given the location of the defect, shape of the defect and the proximity to free margins a dermal autograft was deemed most appropriate.  Using a sterile surgical marker, the primary defect shape was transferred to the donor site. The area thus outlined was incised deep to adipose tissue with a #15 scalpel blade.  The harvested graft was then trimmed of adipose and epidermal tissue until only dermis was left.  The skin graft was then placed in the primary defect and oriented appropriately.
Skin Substitute Text: The defect edges were debeveled with a #15 scalpel blade.  Given the location of the defect, shape of the defect and the proximity to free margins a skin substitute graft was deemed most appropriate.  The graft material was trimmed to fit the size of the defect. The graft was then placed in the primary defect and oriented appropriately.
Tissue Cultured Epidermal Autograft Text: The defect edges were debeveled with a #15 scalpel blade.  Given the location of the defect, shape of the defect and the proximity to free margins a tissue cultured epidermal autograft was deemed most appropriate.  The graft was then trimmed to fit the size of the defect.  The graft was then placed in the primary defect and oriented appropriately.
Xenograft Text: The defect edges were debeveled with a #15 scalpel blade.  Given the location of the defect, shape of the defect and the proximity to free margins a xenograft was deemed most appropriate.  The graft was then trimmed to fit the size of the defect.  The graft was then placed in the primary defect and oriented appropriately.
Purse String (Simple) Text: Given the location of the defect and the characteristics of the surrounding skin a purse string closure was deemed most appropriate.  Undermining was performed circumfirentially around the surgical defect.  A purse string suture was then placed and tightened.
Purse String (Intermediate) Text: Given the location of the defect and the characteristics of the surrounding skin a purse string intermediate closure was deemed most appropriate.  Undermining was performed circumfirentially around the surgical defect.  A purse string suture was then placed and tightened.
Partial Purse String (Simple) Text: Given the location of the defect and the characteristics of the surrounding skin a simple purse string closure was deemed most appropriate.  Undermining was performed circumfirentially around the surgical defect.  A purse string suture was then placed and tightened. Wound tension only allowed a partial closure of the circular defect.
Partial Purse String (Intermediate) Text: Given the location of the defect and the characteristics of the surrounding skin an intermediate purse string closure was deemed most appropriate.  Undermining was performed circumfirentially around the surgical defect.  A purse string suture was then placed and tightened. Wound tension only allowed a partial closure of the circular defect.
Localized Dermabrasion With Wire Brush Text: The patient was draped in routine manner.  Localized dermabrasion using 3 x 17 mm wire brush was performed in routine manner to papillary dermis. This spot dermabrasion is being performed to complete skin cancer reconstruction. It also will eliminate the other sun damaged precancerous cells that are known to be part of the regional effect of a lifetime's worth of sun exposure. This localized dermabrasion is therapeutic and should not be considered cosmetic in any regard.
Tarsorrhaphy Text: A tarsorrhaphy was performed using Frost sutures.
Complex Repair And Flap Additional Text (Will Appearing After The Standard Complex Repair Text): The complex repair was not sufficient to completely close the primary defect. The remaining additional defect was repaired with the flap mentioned below.
Complex Repair And Graft Additional Text (Will Appearing After The Standard Complex Repair Text): The complex repair was not sufficient to completely close the primary defect. The remaining additional defect was repaired with the graft mentioned below.
Unique Flap 1 Name: Myocutaneous Island pedicle Flap
Unique Flap 2 Name: Peng Flap
Unique Flap 3 Name: Mercedes Flap
Unique Flap 4 Name: Banner Flap
Unique Flap 5 Name: tunneled myocutaneous flap
Unique Flap 1 Text: A decision was made to reconstruct the defect utilizing a myocutaneous Island pedicle Flap based on the levator labii superioris muscle.  A telfa template was made of the defect.  This telfa template was then used to outline the myocutaneous flap, based along the meilolabial fold.  The donor area for the pedicle flap was then injected with anesthesia.  The flap was excised through the skin and subcutaneous tissue down to the layer of the underlying musculature.  The myocutaneous flap was carefully excised within this deep plane to maintain its blood supply. Based on the muscle. The edges of the donor site were undermined.   The donor site was closed in a primary fashion to the point of transposition.  The pedicle was then transposed into position and sutured.  Once the flap was sutured into place, adequate blood supply was confirmed with blanching and refill.
Unique Flap 2 Text: A decision was made to reconstruct the defect utilizing a Peng Flap (Bilateral Advancement Rotation Flap). Given the location of the defect and the proximity to free margins, this flap was deemed most appropriate.  Using a sterile surgical marker, the appropriate rotation flaps were drawn incorporating the defect and placing the expected incisions within the relaxed skin tension lines where possible.    The area thus outlined was incised deep to adipose tissue with a #15 scalpel blade.  The skin margins were undermined to an appropriate distance in all directions utilizing iris scissors.
Unique Flap 3 Text: The defect edges were debeveled with a #15 scalpel blade.  Given the location of the defect, shape of the defect and the proximity to free margins a Mercedes (double advancement flap) was deemed most appropriate.  Using a sterile surgical marker, the appropriate transposition flaps were drawn incorporating the defect and placing the expected incisions within the relaxed skin tension lines where possible.    The area thus outlined was incised deep to adipose tissue with a #15 scalpel blade.  The skin margins were undermined to an appropriate distance in all directions utilizing iris scissors.  Hemostasis was achieved with electrocautery.  The flaps were then advanced into the defect and anchored with interrupted buried subcutaneous sutures.
Unique Flap 4 Text: The defect edges were debeveled with a #15 scalpel blade.  Given the location of the defect and the proximity to free margins a Banner transposition flap was deemed most appropriate.  Using a sterile surgical marker, an appropriate Banner transposition flap was drawn incorporating the defect.    The area thus outlined was incised deep to adipose tissue with a #15 scalpel blade.  The skin margins were undermined to an appropriate distance in all directions utilizing iris scissors.
Unique Flap 5 Text: A decision was made to reconstruct the defect utilizing a tunneled myocutaneous Island pedicle Flap based on the anterior auricularis muscle.  A telfa template was made of the defect.  This telfa template was then used to outline the myocutaneous flap, based along the preauricular fold.  The donor area for the pedicle flap was then injected with anesthesia.  The flap was excised through the skin and subcutaneous tissue down to the layer of the underlying musculature.  The myocutaneous flap was carefully excised within this deep plane to maintain its blood supply based on the muscle. The edges of the donor site were undermined.   The donor site was closed in a primary fashion to the point of transposition.  The pedicle was then transposed through a tunnel into position and sutured.  Once the flap was sutured into place, adequate blood supply was confirmed with blanching and refill.
Manual Repair Warning Statement: We plan on removing the manually selected variable below in favor of our much easier automatic structured text blocks found in the previous tab. We decided to do this to help make the flow better and give you the full power of structured data. Manual selection is never going to be ideal in our platform and I would encourage you to avoid using manual selection from this point on, especially since I will be sunsetting this feature. It is important that you do one of two things with the customized text below. First, you can save all of the text in a word file so you can have it for future reference. Second, transfer the text to the appropriate area in the Library tab. Lastly, if there is a flap or graft type which we do not have you need to let us know right away so I can add it in before the variable is hidden. No need to panic, we plan to give you roughly 6 months to make the change.
Same Histology In Subsequent Stages Text: The pattern and morphology of the tumor is as described in the first stage.
No Residual Tumor Seen Histology Text: There were no malignant cells seen in the sections examined.
Inflammation Suggestive Of Cancer Camouflage Histology Text: There was a dense lymphocytic infiltrate which prevented adequate histologic evaluation of adjacent structures.
Bcc Histology Text: There were numerous aggregates of basaloid cells.
Bcc Infiltrative Histology Text: There were numerous aggregates of basaloid cells demonstrating an infiltrative pattern.
Mart-1 - Positive Histology Text: MART-1 staining demonstrates areas of higher density and clustering of melanocytes with Pagetoid spread upwards within the epidermis. The surgical margins are positive for tumor cells.
Mart-1 - Negative Histology Text: MART-1 staining demonstrates a normal density and pattern of melanocytes along the dermal-epidermal junction. The surgical margins are negative for tumor cells.
Information: Selecting Yes will display possible errors in your note based on the variables you have selected. This validation is only offered as a suggestion for you. PLEASE NOTE THAT THE VALIDATION TEXT WILL BE REMOVED WHEN YOU FINALIZE YOUR NOTE. IF YOU WANT TO FAX A PRELIMINARY NOTE YOU WILL NEED TO TOGGLE THIS TO 'NO' IF YOU DO NOT WANT IT IN YOUR FAXED NOTE.

## 2020-09-30 ENCOUNTER — APPOINTMENT (RX ONLY)
Dept: URBAN - METROPOLITAN AREA CLINIC 36 | Facility: CLINIC | Age: 65
Setting detail: DERMATOLOGY
End: 2020-09-30

## 2020-09-30 DIAGNOSIS — Z48.02 ENCOUNTER FOR REMOVAL OF SUTURES: ICD-10-CM

## 2020-09-30 PROCEDURE — 99024 POSTOP FOLLOW-UP VISIT: CPT

## 2020-09-30 PROCEDURE — ? SUTURE REMOVAL (GLOBAL PERIOD)

## 2020-09-30 ASSESSMENT — LOCATION ZONE DERM: LOCATION ZONE: EYELID

## 2020-09-30 ASSESSMENT — LOCATION SIMPLE DESCRIPTION DERM: LOCATION SIMPLE: LEFT INFERIOR EYELID

## 2020-09-30 ASSESSMENT — LOCATION DETAILED DESCRIPTION DERM: LOCATION DETAILED: LEFT MEDIAL INFERIOR EYELID

## 2020-09-30 NOTE — PROCEDURE: SUTURE REMOVAL (GLOBAL PERIOD)
Detail Level: Detailed
Add 85891 Cpt? (Important Note: In 2017 The Use Of 46439 Is Being Tracked By Cms To Determine Future Global Period Reimbursement For Global Periods): yes

## 2020-10-10 ENCOUNTER — IMMUNIZATION (OUTPATIENT)
Dept: SOCIAL WORK | Facility: CLINIC | Age: 65
End: 2020-10-10
Payer: MEDICARE

## 2020-10-10 DIAGNOSIS — Z23 NEED FOR VACCINATION: ICD-10-CM

## 2020-10-10 PROCEDURE — G0008 ADMIN INFLUENZA VIRUS VAC: HCPCS | Performed by: REGISTERED NURSE

## 2020-10-10 PROCEDURE — G0009 ADMIN PNEUMOCOCCAL VACCINE: HCPCS | Performed by: REGISTERED NURSE

## 2020-10-10 PROCEDURE — 90662 IIV NO PRSV INCREASED AG IM: CPT | Performed by: REGISTERED NURSE

## 2020-10-10 PROCEDURE — 90732 PPSV23 VACC 2 YRS+ SUBQ/IM: CPT | Performed by: REGISTERED NURSE

## 2021-01-12 ENCOUNTER — TELEPHONE (OUTPATIENT)
Dept: MEDICAL GROUP | Facility: PHYSICIAN GROUP | Age: 66
End: 2021-01-12

## 2021-01-12 NOTE — TELEPHONE ENCOUNTER
Allyn called into clinic.    Allyn stated that she fell last night and thinks that she broke a rib.    Allyn advised to go to ED or UC for further evaluation.

## 2021-01-13 ENCOUNTER — OFFICE VISIT (OUTPATIENT)
Dept: URGENT CARE | Facility: CLINIC | Age: 66
End: 2021-01-13
Payer: MEDICARE

## 2021-01-13 VITALS
HEART RATE: 100 BPM | OXYGEN SATURATION: 99 % | HEIGHT: 66 IN | SYSTOLIC BLOOD PRESSURE: 120 MMHG | BODY MASS INDEX: 24.59 KG/M2 | TEMPERATURE: 97.2 F | DIASTOLIC BLOOD PRESSURE: 60 MMHG | WEIGHT: 153 LBS | RESPIRATION RATE: 16 BRPM

## 2021-01-13 DIAGNOSIS — S20.211A CONTUSION OF RIGHT CHEST WALL, INITIAL ENCOUNTER: ICD-10-CM

## 2021-01-13 PROCEDURE — 99202 OFFICE O/P NEW SF 15 MIN: CPT | Performed by: FAMILY MEDICINE

## 2021-01-13 ASSESSMENT — ENCOUNTER SYMPTOMS
FLANK PAIN: 0
BACK PAIN: 0
SENSORY CHANGE: 0
ROS SKIN COMMENTS: NO ABRASION OR LACERATION
FOCAL WEAKNESS: 0

## 2021-01-13 ASSESSMENT — FIBROSIS 4 INDEX: FIB4 SCORE: 0.87

## 2021-01-13 NOTE — PROGRESS NOTES
"Subjective:      Allyn Rosenberg is a 65 y.o. female who presents with Rib Pain (x 2 days, Rt rib pain after falling)            2 days right anterior rib pain.  Injury due to ground-level fall.  She was running in the house, slipped and struck furniture.  She initially had neck pain that has resolved.  No head trauma.  No hemoptysis.  No shortness of breath.  Pain was 7/10 last night s slowly improving to 6/10 today.  She has not taken any over-the-counter medication.  No prior injury.  No other aggravating or alleviating factors.      Review of Systems   Genitourinary: Negative for flank pain and hematuria.   Musculoskeletal: Negative for back pain.   Skin:        No abrasion or laceration     Neurological: Negative for sensory change and focal weakness.     .  Medications, Allergies, and current problem list reviewed today in Epic       Objective:     /60 (BP Location: Left arm, Patient Position: Sitting, BP Cuff Size: Adult)   Pulse 100   Temp 36.2 °C (97.2 °F) (Temporal)   Resp 16   Ht 1.664 m (5' 5.5\")   Wt 69.4 kg (153 lb)   SpO2 99%   BMI 25.07 kg/m²      Physical Exam  Constitutional:       General: She is not in acute distress.     Appearance: She is well-developed.   HENT:      Head: Normocephalic and atraumatic.   Eyes:      Conjunctiva/sclera: Conjunctivae normal.   Cardiovascular:      Rate and Rhythm: Normal rate and regular rhythm.      Heart sounds: Normal heart sounds. No murmur.   Pulmonary:      Effort: Pulmonary effort is normal.      Breath sounds: Normal breath sounds. No wheezing.   Chest:       Skin:     General: Skin is warm and dry.      Findings: No rash.   Neurological:      Mental Status: She is alert and oriented to person, place, and time.                 Assessment/Plan:         1. Contusion of right chest wall, initial encounter       Differential diagnosis, natural history, supportive care, and indications for immediate follow-up discussed at length.     Shared " decision to hold imaging today.  She will return with worsening pain, hemoptysis, or shortness of breath    Ice and NSAID as needed.  Continue deep breathing.

## 2021-01-29 PROBLEM — E78.5 DYSLIPIDEMIA: Status: ACTIVE | Noted: 2021-01-29

## 2021-01-29 PROBLEM — R00.2 PALPITATIONS: Status: ACTIVE | Noted: 2021-01-29

## 2021-01-29 PROBLEM — E03.9 ACQUIRED HYPOTHYROIDISM: Status: ACTIVE | Noted: 2021-01-29

## 2021-02-03 ENCOUNTER — HOSPITAL ENCOUNTER (OUTPATIENT)
Dept: LAB | Facility: MEDICAL CENTER | Age: 66
End: 2021-02-03
Attending: NURSE PRACTITIONER
Payer: MEDICARE

## 2021-02-03 ENCOUNTER — OFFICE VISIT (OUTPATIENT)
Dept: MEDICAL GROUP | Facility: PHYSICIAN GROUP | Age: 66
End: 2021-02-03
Payer: MEDICARE

## 2021-02-03 ENCOUNTER — HOSPITAL ENCOUNTER (OUTPATIENT)
Facility: MEDICAL CENTER | Age: 66
End: 2021-02-03
Attending: NURSE PRACTITIONER
Payer: MEDICARE

## 2021-02-03 VITALS
HEART RATE: 68 BPM | DIASTOLIC BLOOD PRESSURE: 72 MMHG | WEIGHT: 152.2 LBS | OXYGEN SATURATION: 98 % | TEMPERATURE: 98.2 F | SYSTOLIC BLOOD PRESSURE: 116 MMHG | BODY MASS INDEX: 24.46 KG/M2 | RESPIRATION RATE: 16 BRPM | HEIGHT: 66 IN

## 2021-02-03 DIAGNOSIS — E78.5 DYSLIPIDEMIA: ICD-10-CM

## 2021-02-03 DIAGNOSIS — E03.9 ACQUIRED HYPOTHYROIDISM: ICD-10-CM

## 2021-02-03 DIAGNOSIS — Z12.11 SCREENING FOR COLORECTAL CANCER: ICD-10-CM

## 2021-02-03 DIAGNOSIS — N39.0 URINARY TRACT INFECTION WITHOUT HEMATURIA, SITE UNSPECIFIED: Primary | ICD-10-CM

## 2021-02-03 DIAGNOSIS — Z23 NEED FOR VACCINATION: ICD-10-CM

## 2021-02-03 DIAGNOSIS — Z00.00 WELL ADULT EXAM: ICD-10-CM

## 2021-02-03 DIAGNOSIS — Z12.12 SCREENING FOR COLORECTAL CANCER: ICD-10-CM

## 2021-02-03 DIAGNOSIS — Z11.59 NEED FOR HEPATITIS C SCREENING TEST: ICD-10-CM

## 2021-02-03 DIAGNOSIS — N39.0 URINARY TRACT INFECTION WITHOUT HEMATURIA, SITE UNSPECIFIED: ICD-10-CM

## 2021-02-03 DIAGNOSIS — Z12.31 ENCOUNTER FOR SCREENING MAMMOGRAM FOR MALIGNANT NEOPLASM OF BREAST: ICD-10-CM

## 2021-02-03 PROBLEM — R32 URINARY INCONTINENCE: Status: ACTIVE | Noted: 2021-02-03

## 2021-02-03 LAB
25(OH)D3 SERPL-MCNC: 39 NG/ML (ref 30–100)
ALBUMIN SERPL BCP-MCNC: 4.5 G/DL (ref 3.2–4.9)
ALBUMIN/GLOB SERPL: 1.4 G/DL
ALP SERPL-CCNC: 96 U/L (ref 30–99)
ALT SERPL-CCNC: 21 U/L (ref 2–50)
ANION GAP SERPL CALC-SCNC: 10 MMOL/L (ref 7–16)
APPEARANCE UR: CLEAR
AST SERPL-CCNC: 15 U/L (ref 12–45)
BASOPHILS # BLD AUTO: 0.6 % (ref 0–1.8)
BASOPHILS # BLD: 0.04 K/UL (ref 0–0.12)
BILIRUB SERPL-MCNC: 0.3 MG/DL (ref 0.1–1.5)
BILIRUB UR STRIP-MCNC: NORMAL MG/DL
BUN SERPL-MCNC: 14 MG/DL (ref 8–22)
CALCIUM SERPL-MCNC: 9.9 MG/DL (ref 8.5–10.5)
CHLORIDE SERPL-SCNC: 102 MMOL/L (ref 96–112)
CHOLEST SERPL-MCNC: 296 MG/DL (ref 100–199)
CO2 SERPL-SCNC: 25 MMOL/L (ref 20–33)
COLOR UR AUTO: YELLOW
CREAT SERPL-MCNC: 0.66 MG/DL (ref 0.5–1.4)
EOSINOPHIL # BLD AUTO: 0.07 K/UL (ref 0–0.51)
EOSINOPHIL NFR BLD: 1.1 % (ref 0–6.9)
ERYTHROCYTE [DISTWIDTH] IN BLOOD BY AUTOMATED COUNT: 46.2 FL (ref 35.9–50)
FASTING STATUS PATIENT QL REPORTED: NORMAL
GLOBULIN SER CALC-MCNC: 3.2 G/DL (ref 1.9–3.5)
GLUCOSE SERPL-MCNC: 94 MG/DL (ref 65–99)
GLUCOSE UR STRIP.AUTO-MCNC: NORMAL MG/DL
HCT VFR BLD AUTO: 43.6 % (ref 37–47)
HCV AB SER QL: NORMAL
HDLC SERPL-MCNC: 75 MG/DL
HGB BLD-MCNC: 14.1 G/DL (ref 12–16)
IMM GRANULOCYTES # BLD AUTO: 0.02 K/UL (ref 0–0.11)
IMM GRANULOCYTES NFR BLD AUTO: 0.3 % (ref 0–0.9)
KETONES UR STRIP.AUTO-MCNC: NORMAL MG/DL
LDLC SERPL CALC-MCNC: 181 MG/DL
LEUKOCYTE ESTERASE UR QL STRIP.AUTO: NORMAL
LYMPHOCYTES # BLD AUTO: 2.09 K/UL (ref 1–4.8)
LYMPHOCYTES NFR BLD: 33.1 % (ref 22–41)
MCH RBC QN AUTO: 31.4 PG (ref 27–33)
MCHC RBC AUTO-ENTMCNC: 32.3 G/DL (ref 33.6–35)
MCV RBC AUTO: 97.1 FL (ref 81.4–97.8)
MONOCYTES # BLD AUTO: 0.67 K/UL (ref 0–0.85)
MONOCYTES NFR BLD AUTO: 10.6 % (ref 0–13.4)
NEUTROPHILS # BLD AUTO: 3.43 K/UL (ref 2–7.15)
NEUTROPHILS NFR BLD: 54.3 % (ref 44–72)
NITRITE UR QL STRIP.AUTO: NORMAL
NRBC # BLD AUTO: 0 K/UL
NRBC BLD-RTO: 0 /100 WBC
PH UR STRIP.AUTO: 5.5 [PH] (ref 5–8)
PLATELET # BLD AUTO: 344 K/UL (ref 164–446)
PMV BLD AUTO: 10.2 FL (ref 9–12.9)
POTASSIUM SERPL-SCNC: 4.7 MMOL/L (ref 3.6–5.5)
PROT SERPL-MCNC: 7.7 G/DL (ref 6–8.2)
PROT UR QL STRIP: NORMAL MG/DL
RBC # BLD AUTO: 4.49 M/UL (ref 4.2–5.4)
RBC UR QL AUTO: NORMAL
SODIUM SERPL-SCNC: 137 MMOL/L (ref 135–145)
SP GR UR STRIP.AUTO: >=1.03
TRIGL SERPL-MCNC: 198 MG/DL (ref 0–149)
TSH SERPL DL<=0.005 MIU/L-ACNC: 2.78 UIU/ML (ref 0.38–5.33)
UROBILINOGEN UR STRIP-MCNC: 0.2 MG/DL
WBC # BLD AUTO: 6.3 K/UL (ref 4.8–10.8)

## 2021-02-03 PROCEDURE — 82306 VITAMIN D 25 HYDROXY: CPT

## 2021-02-03 PROCEDURE — 81002 URINALYSIS NONAUTO W/O SCOPE: CPT | Performed by: NURSE PRACTITIONER

## 2021-02-03 PROCEDURE — 84443 ASSAY THYROID STIM HORMONE: CPT

## 2021-02-03 PROCEDURE — 86803 HEPATITIS C AB TEST: CPT

## 2021-02-03 PROCEDURE — 36415 COLL VENOUS BLD VENIPUNCTURE: CPT

## 2021-02-03 PROCEDURE — 85025 COMPLETE CBC W/AUTO DIFF WBC: CPT

## 2021-02-03 PROCEDURE — 80053 COMPREHEN METABOLIC PANEL: CPT

## 2021-02-03 PROCEDURE — 80061 LIPID PANEL: CPT

## 2021-02-03 PROCEDURE — 99214 OFFICE O/P EST MOD 30 MIN: CPT | Performed by: NURSE PRACTITIONER

## 2021-02-03 PROCEDURE — 87086 URINE CULTURE/COLONY COUNT: CPT

## 2021-02-03 RX ORDER — NITROFURANTOIN 25; 75 MG/1; MG/1
100 CAPSULE ORAL EVERY 12 HOURS
Qty: 10 CAP | Refills: 0 | Status: SHIPPED | OUTPATIENT
Start: 2021-02-03 | End: 2021-02-08

## 2021-02-03 ASSESSMENT — FIBROSIS 4 INDEX: FIB4 SCORE: 0.87

## 2021-02-03 ASSESSMENT — PATIENT HEALTH QUESTIONNAIRE - PHQ9: CLINICAL INTERPRETATION OF PHQ2 SCORE: 0

## 2021-02-03 NOTE — ASSESSMENT & PLAN NOTE
Chronic condition, stable.  Patient has not had labs in approximately 2 years.  She denies feeling lethargic, hair or skin changes, or brittle nails.

## 2021-02-03 NOTE — PROGRESS NOTES
Subjective:     CC: Establish care.    HPI:   Allyn presents today to establish care with me.  Her past medical, family, social and surgical history were reviewed today.  The following issues were addressed at today's visit:    Acquired hypothyroidism  Chronic condition, stable.  Patient has not had labs in approximately 2 years.  She denies feeling lethargic, hair or skin changes, or brittle nails.    Dyslipidemia  Chronic condition, stable.  Patient reports that she has had elevated cholesterol in the past and that she has tried all of the different statins and she cannot tolerate them due to the severe myalgias/arthralgias that she gets with these.    Urinary incontinence  This is a new condition for the patient.  She reports that for the past 2 weeks, she has had at least 1 episode per day of incontinence.  She does not feel this coming.  She tells me that she does not feel like she has a urinary tract infection, though she does have a sense of pressure near her bladder.  She denies dysuria, frequency or urgency.  She denies fever chills or night sweats.      History reviewed. No pertinent past medical history.    Social History     Tobacco Use   • Smoking status: Former Smoker     Packs/day: 0.50     Years: 10.00     Pack years: 5.00     Types: Cigarettes   • Smokeless tobacco: Never Used   Substance Use Topics   • Alcohol use: Yes     Alcohol/week: 3.0 - 6.0 oz     Types: 5 - 10 Glasses of wine per week   • Drug use: No       Current Outpatient Medications Ordered in Epic   Medication Sig Dispense Refill   • doxylamine (UNISOM) 25 MG Tab tablet Take 25 mg by mouth every bedtime.     • nitrofurantoin (MACROBID) 100 MG Cap Take 1 Cap by mouth every 12 hours for 5 days. 10 Cap 0   • Cholecalciferol (VITAMIN D PO) Take 50,000 Units by mouth every 7 days.     • buPROPion (WELLBUTRIN XL) 150 MG XL tablet Take 150 mg by mouth every day.  2   • thyroid (ARMOUR THYROID) 15 MG Tab Take 1 Tab by mouth every day. 30 Tab 3  "  • Multiple Vitamin (MULTIVITAMINS PO) Take  by mouth.       No current Epic-ordered facility-administered medications on file.        Allergies:  Other drug    Health Maintenance: Completed    ROS:  Gen: no fevers/chills, no changes in weight  Eyes: no changes in vision  ENT: no sore throat, no hearing loss, no bloody nose  Pulm: no sob, no cough  CV: no chest pain, no palpitations  GI: no nausea/vomiting, no diarrhea  : no dysuria, +incontinence  MSk: no myalgias  Skin: no rash  Neuro: no headaches, no numbness/tingling  Heme/Lymph: no easy bruising      Objective:       Exam:  /72 (BP Location: Left arm, Patient Position: Sitting, BP Cuff Size: Adult)   Pulse 68   Temp 36.8 °C (98.2 °F) (Temporal)   Resp 16   Ht 1.664 m (5' 5.5\")   Wt 69 kg (152 lb 3.2 oz)   SpO2 98%   BMI 24.94 kg/m²  Body mass index is 24.94 kg/m².    Gen: Alert and oriented, No apparent distress.  Neck: Neck is supple without lymphadenopathy.  Lungs: Normal effort, CTA bilaterally, no wheezes, rhonchi, or rales  CV: Regular rate and rhythm. No murmurs, rubs, or gallops.  Ext: No clubbing, cyanosis, edema.    Labs: POCT UA with trace leukocytes and trace blood.     Assessment & Plan:     65 y.o. female with the following -         1. Well adult exam  2. Screening for colorectal cancer  3. Encounter for screening mammogram for malignant neoplasm of breast  4. Need for hepatitis C screening test  Patient would like to get the shingles vaccine and her tetanus vaccine, however she has just completed her Covid series and needs to wait at least a week prior to getting these.  Orders pended for both Shingrix and Tdap.  - COLOGUARD (FIT DNA)  - MA-SCREENING MAMMO BILAT W/TOMOSYNTHESIS W/CAD; Future   HEP C VIRUS ANTIBODY; Future  - VITAMIN D,25 HYDROXY; Future  - CBC WITH DIFFERENTIAL; Future  - Comp Metabolic Panel; Future    5. Urinary tract infection without hematuria, site unspecified  Acute condition, patient with incontinence and " bladder pressure for the past 2 weeks.  UA with trace blood and trace leukocytes.  Will send for culture, and empirically treat for UTI.  Patient will let me know if she is not improved in 72 hours.  - POCT Urinalysis  - nitrofurantoin (MACROBID) 100 MG Cap; Take 1 Cap by mouth every 12 hours for 5 days.  Dispense: 10 Cap; Refill: 0  - URINE CULTURE    6. Dyslipidemia  Chronic condition, stable.  Patient unable to tolerate any statin medications.  Update labs today.  - Lipid Profile; Future  - CT-CARDIAC SCORING; Future    7. Acquired hypothyroidism  Chronic condition, stable.  Patient has not had labs in approximately 2 years.  Will update today.  - TSH; Future  - TSH WITH REFLEX TO FT4; Future    Follow-up based on lab work, sooner as needed.  No follow-ups on file.    Please note that this dictation was created using voice recognition software. I have made every reasonable attempt to correct obvious errors, but I expect that there are errors of grammar and possibly content that I did not discover before finalizing the note.

## 2021-02-03 NOTE — ASSESSMENT & PLAN NOTE
Chronic condition, stable.  Patient reports that she has had elevated cholesterol in the past and that she has tried all of the different statins and she cannot tolerate them due to the severe myalgias/arthralgias that she gets with these.

## 2021-02-03 NOTE — ASSESSMENT & PLAN NOTE
This is a new condition for the patient.  She reports that for the past 2 weeks, she has had at least 1 episode per day of incontinence.  She does not feel this coming.  She tells me that she does not feel like she has a urinary tract infection, though she does have a sense of pressure near her bladder.  She denies dysuria, frequency or urgency.  She denies fever chills or night sweats.

## 2021-02-04 ENCOUNTER — TELEPHONE (OUTPATIENT)
Dept: MEDICAL GROUP | Facility: PHYSICIAN GROUP | Age: 66
End: 2021-02-04

## 2021-02-04 DIAGNOSIS — Z91.89 OTHER SPECIFIED PERSONAL RISK FACTORS, NOT ELSEWHERE CLASSIFIED: ICD-10-CM

## 2021-02-04 DIAGNOSIS — E78.5 DYSLIPIDEMIA: ICD-10-CM

## 2021-02-04 NOTE — RESULT ENCOUNTER NOTE
Can you call her and let her know that her labs are all excellent except for her cholesterol. Total cholesterol is 296, triglycerides 198, HDL 75, and .   Her ASCVD risk score is 16% (10 year risk of cardiac event).  Please give her lifestyle modifications and let her know that since she cannot tolerate a statin medication, I would like her to get a CT cardiac scoring test to look at the calcium buildup of her vessels.  If it is low, then we can revisit in five years.  If it is over 100, then I will see if I can get another type of injectable drug such as Repatha approved for her.   The CT cardiac scoring test may or may not be covered by insurance, but it is $100 if it is not covered.    Thanks, Dorothea

## 2021-02-05 NOTE — TELEPHONE ENCOUNTER
Spoke to pt on the phone regarding lab result message from MALLORY Engel. Pt says that she is not surprised and stated most of these things were touched on during her appointment as possibilities. Pt verbalized understanding. Will also send [facility] message for pt to refer back to.    Fartun NERI RN, BSN

## 2021-02-05 NOTE — TELEPHONE ENCOUNTER
----- Message from ELDER Engel sent at 2/4/2021  3:35 PM PST -----  Can you call her and let her know that her labs are all excellent except for her cholesterol. Total cholesterol is 296, triglycerides 198, HDL 75, and .   Her ASCVD risk score is 16% (10 year risk of cardiac event).  Please give her lifestyle modifications and let her know that since she cannot tolerate a statin medication, I would like her to get a CT cardiac scoring test to look at the calcium buildup of her vessels.  If it is low, then we can revisit in five years.  If it is over 100, then I will see if I can get another type of injectable drug such as Repatha approved for her.   The CT cardiac scoring test may or may not be covered by insurance, but it is $100 if it is not covered.    Thanks, Dorothea

## 2021-02-06 LAB
BACTERIA UR CULT: NORMAL
SIGNIFICANT IND 70042: NORMAL
SITE SITE: NORMAL
SOURCE SOURCE: NORMAL

## 2021-04-02 ENCOUNTER — HOSPITAL ENCOUNTER (OUTPATIENT)
Dept: RADIOLOGY | Facility: MEDICAL CENTER | Age: 66
End: 2021-04-02
Attending: NURSE PRACTITIONER
Payer: MEDICARE

## 2021-04-02 DIAGNOSIS — Z91.89 OTHER SPECIFIED PERSONAL RISK FACTORS, NOT ELSEWHERE CLASSIFIED: ICD-10-CM

## 2021-04-02 DIAGNOSIS — E78.5 DYSLIPIDEMIA: ICD-10-CM

## 2021-04-02 DIAGNOSIS — Z12.31 ENCOUNTER FOR SCREENING MAMMOGRAM FOR MALIGNANT NEOPLASM OF BREAST: ICD-10-CM

## 2021-04-02 PROCEDURE — 77063 BREAST TOMOSYNTHESIS BI: CPT

## 2021-04-02 PROCEDURE — 4410556 CT-CARDIAC SCORING (SELF PAY ONLY)

## 2021-04-13 ENCOUNTER — PATIENT MESSAGE (OUTPATIENT)
Dept: HEALTH INFORMATION MANAGEMENT | Facility: OTHER | Age: 66
End: 2021-04-13

## 2021-05-07 ENCOUNTER — PATIENT OUTREACH (OUTPATIENT)
Dept: HEALTH INFORMATION MANAGEMENT | Facility: OTHER | Age: 66
End: 2021-05-07

## 2021-06-15 ENCOUNTER — OFFICE VISIT (OUTPATIENT)
Dept: MEDICAL GROUP | Facility: PHYSICIAN GROUP | Age: 66
End: 2021-06-15
Payer: MEDICARE

## 2021-06-15 VITALS
OXYGEN SATURATION: 98 % | HEIGHT: 66 IN | RESPIRATION RATE: 12 BRPM | SYSTOLIC BLOOD PRESSURE: 110 MMHG | DIASTOLIC BLOOD PRESSURE: 68 MMHG | HEART RATE: 74 BPM | BODY MASS INDEX: 24.91 KG/M2 | WEIGHT: 155 LBS | TEMPERATURE: 97 F

## 2021-06-15 DIAGNOSIS — Z23 NEED FOR VACCINATION: ICD-10-CM

## 2021-06-15 DIAGNOSIS — F33.41 RECURRENT MAJOR DEPRESSIVE DISORDER, IN PARTIAL REMISSION (HCC): ICD-10-CM

## 2021-06-15 DIAGNOSIS — K57.92 DIVERTICULITIS: ICD-10-CM

## 2021-06-15 DIAGNOSIS — Z12.83 SKIN EXAM, SCREENING FOR CANCER: ICD-10-CM

## 2021-06-15 DIAGNOSIS — E78.5 DYSLIPIDEMIA: ICD-10-CM

## 2021-06-15 DIAGNOSIS — E03.9 ACQUIRED HYPOTHYROIDISM: ICD-10-CM

## 2021-06-15 PROCEDURE — 90750 HZV VACC RECOMBINANT IM: CPT | Performed by: PHYSICIAN ASSISTANT

## 2021-06-15 PROCEDURE — 90471 IMMUNIZATION ADMIN: CPT | Performed by: PHYSICIAN ASSISTANT

## 2021-06-15 PROCEDURE — 99214 OFFICE O/P EST MOD 30 MIN: CPT | Mod: 25 | Performed by: PHYSICIAN ASSISTANT

## 2021-06-15 RX ORDER — BUPROPION HYDROCHLORIDE 150 MG/1
150 TABLET ORAL DAILY
Qty: 90 TABLET | Refills: 3 | Status: SHIPPED | OUTPATIENT
Start: 2021-06-15 | End: 2021-09-20

## 2021-06-15 RX ORDER — CIPROFLOXACIN 500 MG/1
500 TABLET, FILM COATED ORAL 2 TIMES DAILY
Qty: 20 TABLET | Refills: 0 | Status: SHIPPED | OUTPATIENT
Start: 2021-06-15 | End: 2021-06-18

## 2021-06-15 RX ORDER — ESCITALOPRAM OXALATE 5 MG/1
5 TABLET ORAL DAILY
Qty: 30 TABLET | Refills: 2 | Status: SHIPPED | OUTPATIENT
Start: 2021-06-15 | End: 2021-07-09

## 2021-06-15 RX ORDER — THYROID 15 MG/1
15 TABLET ORAL DAILY
Qty: 90 TABLET | Refills: 3 | Status: SHIPPED | OUTPATIENT
Start: 2021-06-15 | End: 2022-12-07

## 2021-06-15 ASSESSMENT — FIBROSIS 4 INDEX: FIB4 SCORE: 0.64

## 2021-06-15 NOTE — PROGRESS NOTES
CC:   Chief Complaint   Patient presents with   • Establish Care   • Depression          HISTORY OF PRESENT ILLNESS: Patient is a 66 y.o. female established patient who presents today to establish care with me and discuss the following issues:      Health Maintenance: Completed  Updating shingles today. Hold on tdap.   cologuard 02/2021- negative. No FH colon cancer.     Acquired hypothyroidism  This is a  chronic condition.   Onset: years ago  Last TSH level:   TSH   Date Value Ref Range Status   02/03/2021 2.780 0.380 - 5.330 uIU/mL Final     Comment:     Please note new reference ranges effective 12/14/2017 10:00 AM  Pregnant Females, 1st Trimester  0.050-3.700  Pregnant Females, 2nd Trimester  0.310-4.350  Pregnant Females, 3rd Trimester  0.410-5.180       Current Medication: Patient is on [place] Thyroid 50 mg daily.  Side effects to medication:   Denies any  chronic fatigue, constipation, dry skin, weight gain, heat/cold intolerance, or hair loss.       Dyslipidemia  This is a chronic condition.   Latest Labs:   Lab Results   Component Value Date/Time    CHOLSTRLTOT 296 (H) 02/03/2021 12:06 PM     (H) 02/03/2021 12:06 PM    HDL 75 02/03/2021 12:06 PM    TRIGLYCERIDE 198 (H) 02/03/2021 12:06 PM      Medications: none currently. Has tried multiple statins in past and had significant side effects.     Risk calculator: The 10-year ASCVD risk score (Jim BABAR Jr., et al., 2013) is: 5.5%       Recurrent major depressive disorder, in partial remission (HCC)  Patient is on bupropion  mg daily.   Was on lexapro in past and did well on this. Discussed changing medication.     Diverticulitis  History of recurrent diverticulitis. She had a bout 3 weeks ago- took regimen of Cipro she had on hand- resolved it. Prior to that last diverticulitis episode was 2011. She had quit taking her probiotic prior to getting diverticulitis and metamucil. She is back on those now.       Patient Active Problem List    Diagnosis  "Date Noted   • Recurrent major depressive disorder, in partial remission (HCC) 06/15/2021   • Diverticulitis 06/15/2021   • Urinary incontinence 02/03/2021   • Dyslipidemia 01/29/2021   • Acquired hypothyroidism 01/29/2021   • Palpitations 01/29/2021      Allergies:Other drug    Current Outpatient Medications   Medication Sig Dispense Refill   • thyroid (ARMOUR THYROID) 15 MG Tab Take 1 tablet by mouth every day. 90 tablet 3   • escitalopram (LEXAPRO) 5 MG tablet Take 1 tablet by mouth every day. 30 tablet 2   • buPROPion (WELLBUTRIN XL) 150 MG XL tablet Take 1 tablet by mouth every day. 90 tablet 3   • ciprofloxacin (CIPRO) 500 MG Tab Take 1 tablet by mouth 2 times a day for 10 days. 20 tablet 0   • Cholecalciferol (VITAMIN D PO) Take 50,000 Units by mouth every 7 days.       No current facility-administered medications for this visit.       Social History     Tobacco Use   • Smoking status: Former Smoker     Packs/day: 0.50     Years: 10.00     Pack years: 5.00     Types: Cigarettes   • Smokeless tobacco: Never Used   Vaping Use   • Vaping Use: Never used   Substance Use Topics   • Alcohol use: Yes     Alcohol/week: 3.0 - 6.0 oz     Types: 5 - 10 Glasses of wine per week   • Drug use: No     Social History     Social History Narrative   • Not on file       Family History   Problem Relation Age of Onset   • Hypertension Father    • Lung Disease Father    • Other Father    • Heart Disease Neg Hx        Review of Systems:    Constitutional: No Fevers, Chills  Eyes: No vision changes  ENT: No hearing changes  Resp: No Shortness of breath  CV: No Chest pain  GI: No Nausea/Vomiting  MSK: No weakness  Skin: No rashes  Neuro: No Headaches  Psych: No Suicidal ideations    All remaining systems reviewed and found to be negative, except as stated above.    Exam:    /68   Pulse 74   Temp 36.1 °C (97 °F) (Temporal)   Resp 12   Ht 1.664 m (5' 5.5\")   Wt 70.3 kg (155 lb)   SpO2 98%  Body mass index is 25.4 " kg/m².    General:  Well nourished, well developed female in NAD  HENT: Atraumatic, normocephalic  EYES: Extraocular movements intact  NECK: Supple, FROM  CHEST: No deformities, Equal chest expansion  RESP: Unlabored, no stridor or audible wheeze  HEART: Regular Rate and rhythm.   Extremities: No Clubbing, Cyanosis, or Edema  Skin: Warm/dry, without rashes  Neuro: A/O x 4, due to COVID-19- did not have patient remove face mask to test cranial nerves.  Motor/sensory grossly intact  Psych: Normal behavior, normal affect      Lab review:  Labs are reviewed and discussed with a patient  Lab Results   Component Value Date/Time    CHOLSTRLTOT 296 (H) 02/03/2021 12:06 PM     (H) 02/03/2021 12:06 PM    HDL 75 02/03/2021 12:06 PM    TRIGLYCERIDE 198 (H) 02/03/2021 12:06 PM       Lab Results   Component Value Date/Time    SODIUM 137 02/03/2021 12:06 PM    POTASSIUM 4.7 02/03/2021 12:06 PM    CHLORIDE 102 02/03/2021 12:06 PM    CO2 25 02/03/2021 12:06 PM    GLUCOSE 94 02/03/2021 12:06 PM    BUN 14 02/03/2021 12:06 PM    CREATININE 0.66 02/03/2021 12:06 PM     Lab Results   Component Value Date/Time    ALKPHOSPHAT 96 02/03/2021 12:06 PM    ASTSGOT 15 02/03/2021 12:06 PM    ALTSGPT 21 02/03/2021 12:06 PM    TBILIRUBIN 0.3 02/03/2021 12:06 PM      No results found for: HBA1C  No results found for: TSH  Lab Results   Component Value Date/Time    FREET4 0.94 08/24/2016 12:36 PM       Lab Results   Component Value Date/Time    WBC 6.3 02/03/2021 12:06 PM    RBC 4.49 02/03/2021 12:06 PM    HEMOGLOBIN 14.1 02/03/2021 12:06 PM    HEMATOCRIT 43.6 02/03/2021 12:06 PM    MCV 97.1 02/03/2021 12:06 PM    MCH 31.4 02/03/2021 12:06 PM    MCHC 32.3 (L) 02/03/2021 12:06 PM    MPV 10.2 02/03/2021 12:06 PM    NEUTSPOLYS 54.30 02/03/2021 12:06 PM    LYMPHOCYTES 33.10 02/03/2021 12:06 PM    MONOCYTES 10.60 02/03/2021 12:06 PM    EOSINOPHILS 1.10 02/03/2021 12:06 PM    BASOPHILS 0.60 02/03/2021 12:06 PM          Assessment/Plan:  1. Acquired  hypothyroidism  - thyroid (ARMOUR THYROID) 15 MG Tab; Take 1 tablet by mouth every day.  Dispense: 90 tablet; Refill: 3  Chronic addition.  Stable.  Continue [place] Thyroid 50 mg daily.  Repeat labs in August 2021.  2. Dyslipidemia  Patient has a chronically elevated LDL, however CT calcium score from April 2021 her calcium score is 0.  This is very reassuring, in addition she has had an MRI in the past and her small LDL particle count was less than 90.  Discussed with patient she has a more protective pattern with a large particle pattern.  However I would like to continue to monitor this and look for improvement of her cholesterol if she reimplements Metamucil fiber daily.  Repeat cholesterol August 2021.  She has tried multiple statins in the past and had significant side effects.  Repatha would still be a consideration if we needed to.      3. Recurrent major depressive disorder, in partial remission (HCC)  Chronic condition.  Uncontrolled at this point.  Discussed with patient adding a small dose of Lexapro to her Wellbutrin 150 mg XL daily.  We will start with Lexapro 5 mg daily.  I like to follow-up the patient closely on this matter and see her back in 4 weeks.    Follow-up in 1 month.     Return to clinic sooner for symptoms including but not limited to: worsening depression, suicidal ideation, anxiety, agitation, panic attacks, insomnia, irritability, hostility, aggressiveness, impulsivity, hypomania and sarbjit    If such symptoms are observed, you or family need to contact us immediately, and consider calling the National Suicide Prevention Lifeline (1393.191.7165) or 490, or transporting patient to the emergency department for immediate evaluation.     SSRI Black Box Warnings include: Anxiety, agitation, panic attacks, insomnia, irritability, hostility, aggressiveness, impulsivity, hypomania and sarbjit have been reported in adult and pediatric patients being treated with SSRI for major depressive disorder  as well as for other indications.    4. Diverticulitis  Patient has history of recurrent diverticulitis.  Had a bout about 3 weeks ago in which she took an old prescription of Cipro that seem to be effective in resolving it.  Her last bout before that was in 2011.  Will refill Cipro for her to have on hand today.    5. Skin exam, screening for cancer  - REFERRAL TO DERMATOLOGY  Patient would like a screening skin check will refer to skin cancer and dermatology [place].    6. Need for vaccination  - Shingrix Vaccine    Other orders  - escitalopram (LEXAPRO) 5 MG tablet; Take 1 tablet by mouth every day.  Dispense: 30 tablet; Refill: 2  - buPROPion (WELLBUTRIN XL) 150 MG XL tablet; Take 1 tablet by mouth every day.  Dispense: 90 tablet; Refill: 3  - ciprofloxacin (CIPRO) 500 MG Tab; Take 1 tablet by mouth 2 times a day for 10 days.  Dispense: 20 tablet; Refill: 0       Follow-up: Return in about 4 weeks (around 7/13/2021) for Follow up lexapro.    Please note that this dictation was created using voice recognition software. I have made every reasonable attempt to correct obvious errors, but I expect that there are errors of grammar and possibly content that I did not discover before finalizing the note.

## 2021-06-15 NOTE — ASSESSMENT & PLAN NOTE
This is a chronic condition.   Latest Labs:   Lab Results   Component Value Date/Time    CHOLSTRLTOT 296 (H) 02/03/2021 12:06 PM     (H) 02/03/2021 12:06 PM    HDL 75 02/03/2021 12:06 PM    TRIGLYCERIDE 198 (H) 02/03/2021 12:06 PM      Medications: none currently. Has tried multiple statins in past and had significant side effects.     Risk calculator: The 10-year ASCVD risk score (Jimelvira ECKERT Jr., et al., 2013) is: 5.5%

## 2021-06-15 NOTE — ASSESSMENT & PLAN NOTE
This is a  chronic condition.   Onset: years ago  Last TSH level:   TSH   Date Value Ref Range Status   02/03/2021 2.780 0.380 - 5.330 uIU/mL Final     Comment:     Please note new reference ranges effective 12/14/2017 10:00 AM  Pregnant Females, 1st Trimester  0.050-3.700  Pregnant Females, 2nd Trimester  0.310-4.350  Pregnant Females, 3rd Trimester  0.410-5.180       Current Medication: Patient is on [place] Thyroid 50 mg daily.  Side effects to medication:   Denies any  chronic fatigue, constipation, dry skin, weight gain, heat/cold intolerance, or hair loss.

## 2021-06-15 NOTE — ASSESSMENT & PLAN NOTE
Patient is on bupropion  mg daily.   Was on lexapro in past and did well on this. Discussed changing medication.

## 2021-06-15 NOTE — ASSESSMENT & PLAN NOTE
History of recurrent diverticulitis. She had a bout 3 weeks ago- took regimen of Cipro she had on hand- resolved it. Prior to that last diverticulitis episode was 2011. She had quit taking her probiotic prior to getting diverticulitis and metamucil. She is back on those now.

## 2021-06-18 ENCOUNTER — TELEMEDICINE (OUTPATIENT)
Dept: MEDICAL GROUP | Facility: PHYSICIAN GROUP | Age: 66
End: 2021-06-18
Payer: MEDICARE

## 2021-06-18 VITALS — WEIGHT: 155 LBS | HEIGHT: 66 IN | RESPIRATION RATE: 12 BRPM | BODY MASS INDEX: 24.91 KG/M2

## 2021-06-18 DIAGNOSIS — F33.41 RECURRENT MAJOR DEPRESSIVE DISORDER, IN PARTIAL REMISSION (HCC): ICD-10-CM

## 2021-06-18 PROCEDURE — 99213 OFFICE O/P EST LOW 20 MIN: CPT | Mod: 95 | Performed by: PHYSICIAN ASSISTANT

## 2021-06-18 ASSESSMENT — PATIENT HEALTH QUESTIONNAIRE - PHQ9
3. TROUBLE FALLING OR STAYING ASLEEP OR SLEEPING TOO MUCH: NOT AT ALL
6. FEELING BAD ABOUT YOURSELF - OR THAT YOU ARE A FAILURE OR HAVE LET YOURSELF OR YOUR FAMILY DOWN: NEARLY EVERY DAY
5. POOR APPETITE OR OVEREATING: NOT AT ALL
2. FEELING DOWN, DEPRESSED, IRRITABLE, OR HOPELESS: NEARLY EVERY DAY
SUM OF ALL RESPONSES TO PHQ QUESTIONS 1-9: 12
4. FEELING TIRED OR HAVING LITTLE ENERGY: NEARLY EVERY DAY
7. TROUBLE CONCENTRATING ON THINGS, SUCH AS READING THE NEWSPAPER OR WATCHING TELEVISION: NOT AT ALL
SUM OF ALL RESPONSES TO PHQ9 QUESTIONS 1 AND 2: 6
8. MOVING OR SPEAKING SO SLOWLY THAT OTHER PEOPLE COULD HAVE NOTICED. OR THE OPPOSITE, BEING SO FIGETY OR RESTLESS THAT YOU HAVE BEEN MOVING AROUND A LOT MORE THAN USUAL: NOT AT ALL
9. THOUGHTS THAT YOU WOULD BE BETTER OFF DEAD, OR OF HURTING YOURSELF: NOT AT ALL
1. LITTLE INTEREST OR PLEASURE IN DOING THINGS: NEARLY EVERY DAY

## 2021-06-18 ASSESSMENT — FIBROSIS 4 INDEX: FIB4 SCORE: 0.64

## 2021-06-18 NOTE — PROGRESS NOTES
"Virtual Visit: Established Patient   This visit was conducted via Zoom using secure and encrypted videoconferencing technology. The patient was in a private location in the state of [place].    The patient's identity was confirmed and verbal consent was obtained for this virtual visit.    Subjective:   CC:   Chief Complaint   Patient presents with   • Medication Follow-up   • Depression       Allyn Rosenberg is a 66 y.o. female presenting for evaluation and management of:    Recurrent major depressive disorder, in partial remission (HCC)  Patient in on Bupropion  mg daily and last visit we added Lexapro 5 mg. She is having mild headache with it and feels \"amped up\", but her depression feels better. Not causing anxiety. We discussed altering the dosage today.       ROS    Denies any recent fevers or chills. No nausea or vomiting. No chest pains or shortness of breath.     Allergies   Allergen Reactions   • Other Drug      Pt states allergic to \"myacins\"       Current medicines (including changes today)  Current Outpatient Medications   Medication Sig Dispense Refill   • thyroid (ARMOUR THYROID) 15 MG Tab Take 1 tablet by mouth every day. 90 tablet 3   • escitalopram (LEXAPRO) 5 MG tablet Take 1 tablet by mouth every day. 30 tablet 2   • buPROPion (WELLBUTRIN XL) 150 MG XL tablet Take 1 tablet by mouth every day. 90 tablet 3   • Cholecalciferol (VITAMIN D PO) Take 50,000 Units by mouth every 7 days.       No current facility-administered medications for this visit.       Patient Active Problem List    Diagnosis Date Noted   • Recurrent major depressive disorder, in partial remission (HCC) 06/15/2021   • Diverticulitis 06/15/2021   • Urinary incontinence 02/03/2021   • Dyslipidemia 01/29/2021   • Acquired hypothyroidism 01/29/2021   • Palpitations 01/29/2021       Family History   Problem Relation Age of Onset   • Hypertension Father    • Lung Disease Father    • Other Father    • Heart Disease Neg Hx  " "      She  has no past medical history on file.  She  has no past surgical history on file.       Objective:   Resp 12   Ht 1.664 m (5' 5.5\")   Wt 70.3 kg (155 lb)   BMI 25.40 kg/m²     Physical Exam:  Constitutional: Alert, no distress, well-groomed.  Skin: No rashes in visible areas.  Eye: Round. Conjunctiva clear, lids normal. No icterus.   ENMT: Lips pink without lesions, good dentition, moist mucous membranes. Phonation normal.  Neck: No masses, no thyromegaly. Moves freely without pain.  Respiratory: Unlabored respiratory effort, no cough or audible wheeze  Psych: Alert and oriented x3, normal affect and mood.       Assessment and Plan:   The following treatment plan was discussed:     1. Recurrent major depressive disorder, in partial remission (HCC)  Patient feels a benefit of adding the Lexapro 5 mg to her Wellbutrin, however she does feel it is making her a bit \"amped up\", it is not increasing or causing anxiety.  She does have a mild headache, but nothing she needs to take medication for.  Otherwise no other side effects.  Discussed with patient that this could be an acclimation phase of the medication.  I would recommend we try a half tab of the Lexapro, taking 2.5 mg daily and taper up if needed after 4 weeks.  And continue the Wellbutrin for now.    Follow-up: Return in about 4 weeks (around 7/16/2021).        The patient verbalized agreement and understanding of current plan. All questions and concerns were addressed at time of visit.    Please note that this dictation was created using voice recognition software. I have made every reasonable attempt to correct obvious errors, but I expect that there are errors of grammar and possibly content that I did not discover before finalizing the note.  "

## 2021-06-18 NOTE — ASSESSMENT & PLAN NOTE
"Patient in on Bupropion  mg daily and last visit we added Lexapro 5 mg. She is having mild headache with it and feels \"amped up\", but her depression feels better. Not causing anxiety. We discussed altering the dosage today.   "

## 2021-07-09 RX ORDER — ESCITALOPRAM OXALATE 5 MG/1
5 TABLET ORAL DAILY
Qty: 30 TABLET | Refills: 0 | Status: SHIPPED | OUTPATIENT
Start: 2021-07-09 | End: 2021-08-12

## 2021-07-09 NOTE — TELEPHONE ENCOUNTER
Pt to be seen in the next few weeks to see how initiation is going. Will send 1 fill to pharmacy as pt is on half a dose right now.

## 2021-08-12 RX ORDER — ESCITALOPRAM OXALATE 5 MG/1
5 TABLET ORAL DAILY
Qty: 90 TABLET | Refills: 1 | Status: SHIPPED | OUTPATIENT
Start: 2021-08-12 | End: 2021-09-20 | Stop reason: DRUGHIGH

## 2021-09-20 ENCOUNTER — OFFICE VISIT (OUTPATIENT)
Dept: MEDICAL GROUP | Facility: PHYSICIAN GROUP | Age: 66
End: 2021-09-20
Payer: MEDICARE

## 2021-09-20 VITALS
OXYGEN SATURATION: 99 % | DIASTOLIC BLOOD PRESSURE: 78 MMHG | RESPIRATION RATE: 12 BRPM | HEIGHT: 66 IN | HEART RATE: 71 BPM | SYSTOLIC BLOOD PRESSURE: 116 MMHG | TEMPERATURE: 98.2 F | WEIGHT: 150 LBS | BODY MASS INDEX: 24.11 KG/M2

## 2021-09-20 DIAGNOSIS — F33.41 RECURRENT MAJOR DEPRESSIVE DISORDER, IN PARTIAL REMISSION (HCC): ICD-10-CM

## 2021-09-20 DIAGNOSIS — Z23 NEED FOR VACCINATION: ICD-10-CM
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
## 2021-09-20 DIAGNOSIS — E78.5 DYSLIPIDEMIA: ICD-10-CM

## 2021-09-20 PROBLEM — R29.6 FALLS: Status: ACTIVE | Noted: 2021-09-20

## 2021-09-20 PROCEDURE — 90750 HZV VACC RECOMBINANT IM: CPT | Performed by: PHYSICIAN ASSISTANT

## 2021-09-20 PROCEDURE — 90471 IMMUNIZATION ADMIN: CPT | Performed by: PHYSICIAN ASSISTANT

## 2021-09-20 PROCEDURE — 99214 OFFICE O/P EST MOD 30 MIN: CPT | Mod: 25 | Performed by: PHYSICIAN ASSISTANT

## 2021-09-20 RX ORDER — ESCITALOPRAM OXALATE 10 MG/1
10 TABLET ORAL DAILY
Qty: 30 TABLET | Refills: 2 | Status: SHIPPED | OUTPATIENT
Start: 2021-09-20 | End: 2021-10-04

## 2021-09-20 ASSESSMENT — FIBROSIS 4 INDEX: FIB4 SCORE: 0.64

## 2021-09-20 NOTE — ASSESSMENT & PLAN NOTE
2 accidental fall in the last year- 1 was dancing around to [employer]- and fell into coffee table. And another fell into couch and broke her toe.     Discussed difficulty walking and balance issues- seems to be corrected with new glasses.

## 2021-09-20 NOTE — NON-PROVIDER
Annual Health Assessment Questions:    1.  Are you currently engaging in any exercise or physical activity? Yes    2.  How would you describe your mood or emotional well-being today? good    3.  Have you had any falls in the last year? Yes    4.  Have you noticed any problems with your balance or had difficulty walking? Yes    5.  In the last six months have you experienced any leakage of urine? No    6. DPA/Advanced Directive: Patient does not have an Advanced Directive.  A packet and workshop information was given on Advanced Directives.

## 2021-09-20 NOTE — PROGRESS NOTES
CC:   Chief Complaint   Patient presents with   • Medication Refill   • Depression          HISTORY OF PRESENT ILLNESS: Patient is a 66 y.o. female established patient who presents today to follow up on the following conditions:     Health Maintenance: Completed    Recurrent major depressive disorder, in partial remission (HCC)  Patient is on Wellbutrin  mg daily and Lexapro 5 mg daily.  Discussed with patient today about discontinuing the Wellbutrin and increasing the Lexapro.    She feels much better with the addition of Lexapro.     She started taking wellbutrin every other day. She started losing hair x 6 months- wondering if medication.     Falls  2 accidental fall in the last year- 1 was dancing around to [employer]- and fell into coffee table. And another fell into couch and broke her toe.     Discussed difficulty walking and balance issues- seems to be corrected with new glasses.       Patient Active Problem List    Diagnosis Date Noted   • Falls 09/20/2021   • Recurrent major depressive disorder, in partial remission (HCC) 06/15/2021   • Diverticulitis 06/15/2021   • Urinary incontinence 02/03/2021   • Dyslipidemia 01/29/2021   • Acquired hypothyroidism 01/29/2021   • Palpitations 01/29/2021      Allergies:Other drug    Current Outpatient Medications   Medication Sig Dispense Refill   • escitalopram (LEXAPRO) 10 MG Tab Take 1 Tablet by mouth every day. 30 Tablet 2   • thyroid (ARMOUR THYROID) 15 MG Tab Take 1 tablet by mouth every day. 90 tablet 3   • Cholecalciferol (VITAMIN D PO) Take 50,000 Units by mouth every 7 days.       No current facility-administered medications for this visit.       Social History     Tobacco Use   • Smoking status: Former Smoker     Packs/day: 0.50     Years: 10.00     Pack years: 5.00     Types: Cigarettes   • Smokeless tobacco: Never Used   Vaping Use   • Vaping Use: Never used   Substance Use Topics   • Alcohol use: Yes     Alcohol/week: 3.0 - 6.0 oz     Types: 5 - 10  "Glasses of wine per week   • Drug use: No     Social History     Social History Narrative   • Not on file       Family History   Problem Relation Age of Onset   • Hypertension Father    • Lung Disease Father    • Other Father    • Heart Disease Neg Hx        Review of Systems:    Constitutional: No Fevers, Chills  Eyes: No vision changes  ENT: No hearing changes  Resp: No Shortness of breath  CV: No Chest pain  GI: No Nausea/Vomiting  MSK: No weakness  Skin: No rashes  Neuro: No Headaches  Psych: No Suicidal ideations    All remaining systems reviewed and found to be negative, except as stated above.    Exam:    /78   Pulse 71   Temp 36.8 °C (98.2 °F) (Temporal)   Resp 12   Ht 1.664 m (5' 5.5\")   Wt 68 kg (150 lb)   SpO2 99%  Body mass index is 24.58 kg/m².    General:  Well nourished, well developed female in NAD  HENT: Atraumatic, normocephalic  EYES: Extraocular movements intact  NECK: Supple, FROM  CHEST: No deformities, Equal chest expansion  RESP: Unlabored, no stridor or audible wheeze  HEART: Regular Rate and rhythm.   Extremities: No Clubbing, Cyanosis, or Edema  Skin: Warm/dry, without rashes  Neuro: A/O x 4, due to COVID-19- did not have patient remove face mask to test cranial nerves.  Motor/sensory grossly intact  Psych: Normal behavior, normal affect      Lab review:  Labs are reviewed and discussed with a patient  Lab Results   Component Value Date/Time    CHOLSTRLTOT 296 (H) 02/03/2021 12:06 PM     (H) 02/03/2021 12:06 PM    HDL 75 02/03/2021 12:06 PM    TRIGLYCERIDE 198 (H) 02/03/2021 12:06 PM       Lab Results   Component Value Date/Time    SODIUM 137 02/03/2021 12:06 PM    POTASSIUM 4.7 02/03/2021 12:06 PM    CHLORIDE 102 02/03/2021 12:06 PM    CO2 25 02/03/2021 12:06 PM    GLUCOSE 94 02/03/2021 12:06 PM    BUN 14 02/03/2021 12:06 PM    CREATININE 0.66 02/03/2021 12:06 PM     Lab Results   Component Value Date/Time    ALKPHOSPHAT 96 02/03/2021 12:06 PM    ASTSGOT 15 02/03/2021 " 12:06 PM    ALTSGPT 21 02/03/2021 12:06 PM    TBILIRUBIN 0.3 02/03/2021 12:06 PM      No results found for: HBA1C  No results found for: TSH  Lab Results   Component Value Date/Time    FREET4 0.94 08/24/2016 12:36 PM       Lab Results   Component Value Date/Time    WBC 6.3 02/03/2021 12:06 PM    RBC 4.49 02/03/2021 12:06 PM    HEMOGLOBIN 14.1 02/03/2021 12:06 PM    HEMATOCRIT 43.6 02/03/2021 12:06 PM    MCV 97.1 02/03/2021 12:06 PM    MCH 31.4 02/03/2021 12:06 PM    MCHC 32.3 (L) 02/03/2021 12:06 PM    MPV 10.2 02/03/2021 12:06 PM    NEUTSPOLYS 54.30 02/03/2021 12:06 PM    LYMPHOCYTES 33.10 02/03/2021 12:06 PM    MONOCYTES 10.60 02/03/2021 12:06 PM    EOSINOPHILS 1.10 02/03/2021 12:06 PM    BASOPHILS 0.60 02/03/2021 12:06 PM          Assessment/Plan:  1. Recurrent major depressive disorder, in partial remission (HCC)  We will trial coming off the Wellbutrin completely, continue Lexapro and will increase to 10 mg daily.  Follow-up in 6 weeks on this matter.  2. Fall, initial encounter  Patient states she has had a few trip and falls over the last year.  No coordination issues.  Patient was having issues with her balance, but found out that it was her prescription on her glasses.  Has had no issues since she got new glasses.  3. Dyslipidemia  - LipoFit by NMR; Future  Chronic condition, patient has a small particle pattern, recent CT calcium score reviewed again today and CT calcium score of 0.  We will update her NMR.  Patient has changed her diet dramatically, she is wondering if there is improvement on her cholesterol levels as well.  4. Need for vaccination  - Shingrix Vaccine    Other orders  - escitalopram (LEXAPRO) 10 MG Tab; Take 1 Tablet by mouth every day.  Dispense: 30 Tablet; Refill: 2       Follow-up: Return in about 6 weeks (around 11/1/2021) for Follow up.    Please note that this dictation was created using voice recognition software. I have made every reasonable attempt to correct obvious errors, but  I expect that there are errors of grammar and possibly content that I did not discover before finalizing the note.

## 2021-09-20 NOTE — ASSESSMENT & PLAN NOTE
Patient is on Wellbutrin  mg daily and Lexapro 5 mg daily.  Discussed with patient today about discontinuing the Wellbutrin and increasing the Lexapro.    She feels much better with the addition of Lexapro.     She started taking wellbutrin every other day. She started losing hair x 6 months- wondering if medication.

## 2021-10-04 RX ORDER — ESCITALOPRAM OXALATE 10 MG/1
10 TABLET ORAL DAILY
Qty: 90 TABLET | Refills: 1 | Status: SHIPPED | OUTPATIENT
Start: 2021-10-04 | End: 2022-09-13 | Stop reason: SDUPTHER

## 2021-10-04 NOTE — TELEPHONE ENCOUNTER
Changes Requested     Name from pharmacy: ESCITALOPRAM 10 MG TABLET         Will file in chart as: escitalopram (LEXAPRO) 10 MG Tab    Sig: Take 1 Tablet by mouth every day.    Original sig: TAKE 1 TABLET BY MOUTH EVERY DAY    Disp:  90 Tablet    Refills:  1    Start: 10/4/2021    Class: Normal    Non-formulary    Last ordered: 2 weeks ago by CRISTY HowellAWALTER     Rx #: [id number]    Pharmacy comment: REQUEST FOR 90 DAYS PRESCRIPTION.       To be filled at: [facility]/pharmacy #[id number] - [place]egas - [address]

## 2022-01-18 ENCOUNTER — PATIENT MESSAGE (OUTPATIENT)
Dept: HEALTH INFORMATION MANAGEMENT | Facility: OTHER | Age: 67
End: 2022-01-18
Payer: MEDICARE

## 2022-04-26 ENCOUNTER — TELEPHONE (OUTPATIENT)
Dept: HEALTH INFORMATION MANAGEMENT | Facility: OTHER | Age: 67
End: 2022-04-26
Payer: MEDICARE

## 2022-04-26 NOTE — TELEPHONE ENCOUNTER
MARCIAL-Scheduled on 05/16/22 at 12 pm with Adelita Joseph at [address]. Member also asked to schedule with a new provider. Scheduled her on 05/03/22 at 1 pm with Dorothea oRsa at [address] HIPAA verified, no other questions or concerns.

## 2022-05-03 ENCOUNTER — OFFICE VISIT (OUTPATIENT)
Dept: MEDICAL GROUP | Facility: PHYSICIAN GROUP | Age: 67
End: 2022-05-03
Payer: MEDICARE

## 2022-05-03 VITALS
DIASTOLIC BLOOD PRESSURE: 64 MMHG | HEIGHT: 65 IN | SYSTOLIC BLOOD PRESSURE: 110 MMHG | HEART RATE: 64 BPM | WEIGHT: 155 LBS | BODY MASS INDEX: 25.83 KG/M2 | RESPIRATION RATE: 18 BRPM | TEMPERATURE: 98.2 F | OXYGEN SATURATION: 98 %

## 2022-05-03 DIAGNOSIS — F33.41 RECURRENT MAJOR DEPRESSIVE DISORDER, IN PARTIAL REMISSION (HCC): Primary | ICD-10-CM

## 2022-05-03 DIAGNOSIS — Z12.31 ENCOUNTER FOR SCREENING MAMMOGRAM FOR MALIGNANT NEOPLASM OF BREAST: ICD-10-CM

## 2022-05-03 DIAGNOSIS — K57.92 DIVERTICULITIS: ICD-10-CM

## 2022-05-03 DIAGNOSIS — E03.9 ACQUIRED HYPOTHYROIDISM: ICD-10-CM

## 2022-05-03 DIAGNOSIS — Z79.899 HIGH RISK MEDICATION USE: ICD-10-CM

## 2022-05-03 DIAGNOSIS — N39.41 URGE INCONTINENCE OF URINE: ICD-10-CM

## 2022-05-03 DIAGNOSIS — R79.89 LOW VITAMIN D LEVEL: ICD-10-CM

## 2022-05-03 DIAGNOSIS — Z78.0 POSTMENOPAUSAL: ICD-10-CM

## 2022-05-03 DIAGNOSIS — E78.5 DYSLIPIDEMIA: ICD-10-CM

## 2022-05-03 DIAGNOSIS — Z87.891 HISTORY OF SMOKING: ICD-10-CM

## 2022-05-03 DIAGNOSIS — Z13.79 GENETIC SCREENING: ICD-10-CM

## 2022-05-03 PROBLEM — N39.46 MIXED STRESS AND URGE URINARY INCONTINENCE: Status: ACTIVE | Noted: 2022-05-03

## 2022-05-03 PROCEDURE — 99214 OFFICE O/P EST MOD 30 MIN: CPT | Performed by: NURSE PRACTITIONER

## 2022-05-03 RX ORDER — MIRABEGRON 25 MG/1
25 TABLET, FILM COATED, EXTENDED RELEASE ORAL DAILY
Qty: 30 TABLET | Refills: 1 | Status: SHIPPED | OUTPATIENT
Start: 2022-05-03 | End: 2022-08-09

## 2022-05-03 ASSESSMENT — FIBROSIS 4 INDEX: FIB4 SCORE: 0.65

## 2022-05-03 ASSESSMENT — PATIENT HEALTH QUESTIONNAIRE - PHQ9: CLINICAL INTERPRETATION OF PHQ2 SCORE: 0

## 2022-05-03 NOTE — PROGRESS NOTES
Annual Health Assessment Questions:    1.  Are you currently engaging in any exercise or physical activity? Yes    2.  How would you describe your mood or emotional well-being today? fair    3.  Have you had any falls in the last year? No    4.  Have you noticed any problems with your balance or had difficulty walking? No    5.  In the last six months have you experienced any leakage of urine? No    6. DPA/Advanced Directive: Patient does not have an Advanced Directive.  A packet and workshop information was given on Advanced Directives.

## 2022-05-04 PROBLEM — E78.5 DYSLIPIDEMIA: Chronic | Status: ACTIVE | Noted: 2021-01-29

## 2022-05-04 PROBLEM — R79.89 LOW VITAMIN D LEVEL: Status: ACTIVE | Noted: 2022-05-04

## 2022-05-04 PROBLEM — R32 URINARY INCONTINENCE: Chronic | Status: ACTIVE | Noted: 2021-02-03

## 2022-05-04 PROBLEM — K57.92 DIVERTICULITIS: Chronic | Status: ACTIVE | Noted: 2021-06-15

## 2022-05-04 PROBLEM — E03.9 ACQUIRED HYPOTHYROIDISM: Chronic | Status: ACTIVE | Noted: 2021-01-29

## 2022-05-04 PROBLEM — F33.41 RECURRENT MAJOR DEPRESSIVE DISORDER, IN PARTIAL REMISSION (HCC): Chronic | Status: ACTIVE | Noted: 2021-06-15

## 2022-05-04 PROBLEM — Z13.79 GENETIC SCREENING: Chronic | Status: ACTIVE | Noted: 2022-05-03

## 2022-05-04 PROBLEM — R79.89 LOW VITAMIN D LEVEL: Chronic | Status: ACTIVE | Noted: 2022-05-04

## 2022-05-04 PROBLEM — Z87.891 HISTORY OF SMOKING: Chronic | Status: ACTIVE | Noted: 2022-05-03

## 2022-05-04 NOTE — ASSESSMENT & PLAN NOTE
Patient has a history of smoking, and is concerned about lung cancer.  She would like a chest x-ray for screening.  This was ordered for her today.

## 2022-05-04 NOTE — ASSESSMENT & PLAN NOTE
Chronic condition, stable.  Patient has a history of vitamin D deficiency.  She does take supplemental vitamin D 50,000 units every 7 days.  She is due for updated labs, and these were ordered for her today.

## 2022-05-04 NOTE — ASSESSMENT & PLAN NOTE
Chronic condition, stable.  Patient takes escitalopram 10 mg daily.  She feels well on this and does not wish to make any changes to her regimen.  She will continue her current regimen.

## 2022-05-04 NOTE — ASSESSMENT & PLAN NOTE
Patient would like to participate in the [facility] project.  Referral was placed today.

## 2022-05-04 NOTE — PROGRESS NOTES
Subjective:     CC: The primary encounter diagnosis was Recurrent major depressive disorder, in partial remission (HCC). Diagnoses of Diverticulitis, Urge incontinence of urine, Genetic screening, Low vitamin D level, History of smoking, Dyslipidemia, Acquired hypothyroidism, High risk medication use, Encounter for screening mammogram for malignant neoplasm of breast, and Postmenopausal were also pertinent to this visit.      HPI:   Allyn is a new patient to me today wanting to establish care.  We discussed the following problems:    1. Recurrent major depressive disorder, in partial remission (HCC)  Chronic condition, stable.  Patient here for follow-up today.    2. Diverticulitis  Chronic condition, stable.  Patient here for follow-up today.    3. Urge incontinence of urine  Chronic condition, stable.  Patient here for follow-up today.    4. Genetic screening  Patient would like to participate in the healthy [place] project.  Referral was placed today.    5. Low vitamin D level  Chronic condition, stable.  Patient due for updated labs today.    6. History of smoking  Patient with history of smoking.  She would like chest x-ray for screening today.  Education provided.    7. Dyslipidemia  Chronic condition, stable.  Patient here for follow-up today.    8. Acquired hypothyroidism  Chronic condition, stable.  Patient here for follow-up today.    9. High risk medication use  Labs placed for surveillance of high risk medication use.    10. Encounter for screening mammogram for malignant neoplasm of breast  Screening mammogram ordered for patient today.    11. Postmenopausal  DEXA scan ordered for patient today.       No past medical history on file.    Social History     Tobacco Use   • Smoking status: Former Smoker     Packs/day: 0.50     Years: 10.00     Pack years: 5.00     Types: Cigarettes   • Smokeless tobacco: Never Used   Vaping Use   • Vaping Use: Never used   Substance Use Topics   • Alcohol use: Yes      "Alcohol/week: 3.0 - 6.0 oz     Types: 5 - 10 Glasses of wine per week   • Drug use: No       Current Outpatient Medications Ordered in Epic   Medication Sig Dispense Refill   • Mirabegron ER (MYRBETRIQ) 25 MG TABLET SR 24 HR Take 25 mg by mouth every day. 30 Tablet 1   • escitalopram (LEXAPRO) 10 MG Tab TAKE 1 TABLET BY MOUTH EVERY DAY 90 Tablet 1   • thyroid (ARMOUR THYROID) 15 MG Tab Take 1 tablet by mouth every day. 90 tablet 3   • Cholecalciferol (VITAMIN D PO) Take 50,000 Units by mouth every 7 days.       No current [facility]-ordered facility-administered medications on file.       Allergies:  Other drug    Health Maintenance: Deferred for annual wellness    ROS:  Gen: no fevers/chills, no changes in weight  Eyes: no changes in vision  ENT: no sore throat, no hearing loss, no bloody nose  Pulm: no sob, no cough  CV: no chest pain, no palpitations  GI: no nausea/vomiting, no diarrhea  : no dysuria  MSk: no myalgias  Skin: no rash  Neuro: no headaches, no numbness/tingling  Heme/Lymph: no easy bruising      Objective:       Exam:  /64 (BP Location: Left arm, Patient Position: Sitting, BP Cuff Size: Adult)   Pulse 64   Temp 36.8 °C (98.2 °F) (Temporal)   Resp 18   Ht 1.651 m (5' 5\")   Wt 70.3 kg (155 lb)   SpO2 98%   BMI 25.79 kg/m²  Body mass index is 25.79 kg/m².    Gen: Alert and oriented, No apparent distress.  Neck: Neck is supple without lymphadenopathy.  No carotid bruits.  Lungs: Normal effort, CTA bilaterally, no wheezes, rhonchi, or rales  CV: Regular rate and rhythm. No murmurs, rubs, or gallops.  Ext: No clubbing, cyanosis, edema.    Labs: Labs dated 2/3/2021 reviewed with patient today.    Assessment & Plan:     67 y.o. female with the following -     Mixed stress and urge urinary incontinence  Patient reports worsening stress and urge incontinence for the past six months.     Dyslipidemia  Chronic condition, stable.  Patient has elevated cholesterol, and has myalgias with statin " medication.  She has had CT cardiac scoring on 4/2/2021 with a score of 0.  She does have a family history of elevated cholesterol.  She is due for updated labs today, and orders were placed.  She was also referred for genetic testing for familial hypercholesterolemia.    Acquired hypothyroidism  Chronic condition, stable.  Patient takes [place] Thyroid 15 mg daily.  Her last TSH was 2.78 in February 2021.  She feels well on her medication.  She denies hair loss, skin changes or brittle nails.  Updated labs were ordered today.  She will continue her current regimen.    Urinary incontinence  Chronic condition, ongoing.  Patient reports that she continues to have incontinence, with stress.  She states that this has been ongoing now for over a year.  She would like to try medication today, and she was prescribed mirabegron 25 mg daily.  She will follow-up in 6 months months to see how she is doing with this, sooner if it is ineffective.  We discussed referral to urology for further management and treatment if this is not effective for her.    Diverticulitis  Chronic condition, ongoing.  Patient has had 2 episodes of acute diverticulitis over the past year.  Prior to that she had not had episodes since 2011.  She is not established with GI, and would like consultation.  Patient would like a colonoscopy as well as EGD.  Referral was placed for her today.    Genetic screening  Patient would like to participate in the healthy [place] project.  Referral was placed today.    History of smoking  Patient has a history of smoking, and is concerned about lung cancer.  She would like a chest x-ray for screening.  This was ordered for her today.    Recurrent major depressive disorder, in partial remission (HCC)  Chronic condition, stable.  Patient takes escitalopram 10 mg daily.  She feels well on this and does not wish to make any changes to her regimen.  She will continue her current regimen.    Low vitamin D level  Chronic  condition, stable.  Patient has a history of vitamin D deficiency.  She does take supplemental vitamin D 50,000 units every 7 days.  She is due for updated labs, and these were ordered for her today.      Orders Placed This Encounter   • MA-SCREENING MAMMO BILAT W/TOMOSYNTHESIS W/CAD   • DS-BONE DENSITY STUDY (DEXA)   • DX-CHEST-2 VIEWS   • CBC WITH DIFFERENTIAL   • Comp Metabolic Panel   • Lipid Profile   • TSH   • VITAMIN D,25 HYDROXY   • FREE THYROXINE   • TRIIDOTHYRONINE   • Referral to Gastroenterology   • Referral to Genetic Research Studies   • Mirabegron ER (MYRBETRIQ) 25 MG TABLET SR 24 HR          Return in about 6 months (around 11/3/2022).    I have placed the below orders and discussed them with an approved delegating provider.  The MA is performing the below orders under the direction of Brittaney Segura MD.    Please note that this dictation was created using voice recognition software. I have made every reasonable attempt to correct obvious errors, but I expect that there are errors of grammar and possibly content that I did not discover before finalizing the note.

## 2022-05-04 NOTE — ASSESSMENT & PLAN NOTE
Chronic condition, ongoing.  Patient reports that she continues to have incontinence, with stress.  She states that this has been ongoing now for over a year.  She would like to try medication today, and she was prescribed mirabegron 25 mg daily.  She will follow-up in 6 months months to see how she is doing with this, sooner if it is ineffective.  We discussed referral to urology for further management and treatment if this is not effective for her.

## 2022-05-04 NOTE — ASSESSMENT & PLAN NOTE
Chronic condition, stable.  Patient has elevated cholesterol, and has myalgias with statin medication.  She has had CT cardiac scoring on 4/2/2021 with a score of 0.  She does have a family history of elevated cholesterol.  She is due for updated labs today, and orders were placed.  She was also referred for genetic testing for familial hypercholesterolemia.

## 2022-05-04 NOTE — ASSESSMENT & PLAN NOTE
Chronic condition, ongoing.  Patient has had 2 episodes of acute diverticulitis over the past year.  Prior to that she had not had episodes since 2011.  She is not established with GI, and would like consultation.  Patient would like a colonoscopy as well as EGD.  Referral was placed for her today.

## 2022-05-11 ENCOUNTER — RESEARCH ENCOUNTER (OUTPATIENT)
Dept: MEDICAL GROUP | Facility: PHYSICIAN GROUP | Age: 67
End: 2022-05-11
Payer: MEDICARE

## 2022-05-11 DIAGNOSIS — Z00.6 RESEARCH STUDY PATIENT: ICD-10-CM

## 2022-06-07 LAB
APOB+LDLR+PCSK9 GENE MUT ANL BLD/T: NOT DETECTED
BRCA1+BRCA2 DEL+DUP + FULL MUT ANL BLD/T: NOT DETECTED
MLH1+MSH2+MSH6+PMS2 GN DEL+DUP+FUL M: NOT DETECTED

## 2022-08-07 DIAGNOSIS — N39.41 URGE INCONTINENCE OF URINE: ICD-10-CM

## 2022-08-09 RX ORDER — MIRABEGRON 25 MG/1
25 TABLET, FILM COATED, EXTENDED RELEASE ORAL DAILY
Qty: 30 TABLET | Refills: 1 | Status: SHIPPED | OUTPATIENT
Start: 2022-08-09 | End: 2022-10-12

## 2022-09-13 RX ORDER — ESCITALOPRAM OXALATE 10 MG/1
10 TABLET ORAL DAILY
Qty: 90 TABLET | Refills: 1 | Status: SHIPPED | OUTPATIENT
Start: 2022-09-13 | End: 2022-12-20 | Stop reason: SDUPTHER

## 2022-09-28 ENCOUNTER — HOSPITAL ENCOUNTER (OUTPATIENT)
Dept: RADIOLOGY | Facility: MEDICAL CENTER | Age: 67
End: 2022-09-28
Attending: NURSE PRACTITIONER
Payer: MEDICARE

## 2022-09-28 ENCOUNTER — TELEPHONE (OUTPATIENT)
Dept: MEDICAL GROUP | Facility: PHYSICIAN GROUP | Age: 67
End: 2022-09-28
Payer: MEDICARE

## 2022-09-28 DIAGNOSIS — Z78.0 POSTMENOPAUSAL: ICD-10-CM

## 2022-09-28 PROCEDURE — 77080 DXA BONE DENSITY AXIAL: CPT

## 2022-09-28 NOTE — TELEPHONE ENCOUNTER
----- Message from ELDER Engel sent at 9/28/2022 12:52 PM PDT -----  Can you call patient and have her schedule an appointment to discuss her bone density scan results please?  D

## 2022-09-28 NOTE — TELEPHONE ENCOUNTER
Phone Number Called: [phone] (home)       Call outcome: Did not leave a detailed message. Requested patient to call back.to schedule an appointment to go over her test results with her provider

## 2022-09-28 NOTE — RESULT ENCOUNTER NOTE
Can you call patient and have her schedule an appointment to discuss her bone density scan results please?  D

## 2022-10-11 RX ORDER — AMOXICILLIN 500 MG/1
CAPSULE ORAL
COMMUNITY
Start: 2022-09-06 | End: 2022-10-12

## 2022-10-12 ENCOUNTER — TELEMEDICINE (OUTPATIENT)
Dept: MEDICAL GROUP | Facility: PHYSICIAN GROUP | Age: 67
End: 2022-10-12
Payer: MEDICARE

## 2022-10-12 VITALS — HEIGHT: 65 IN | BODY MASS INDEX: 27.32 KG/M2 | RESPIRATION RATE: 16 BRPM | WEIGHT: 164 LBS

## 2022-10-12 DIAGNOSIS — M81.0 AGE-RELATED OSTEOPOROSIS WITHOUT CURRENT PATHOLOGICAL FRACTURE: Primary | ICD-10-CM

## 2022-10-12 PROCEDURE — 99213 OFFICE O/P EST LOW 20 MIN: CPT | Mod: 95 | Performed by: NURSE PRACTITIONER

## 2022-10-12 RX ORDER — ALENDRONATE SODIUM 70 MG/1
70 TABLET ORAL
Qty: 4 TABLET | Refills: 3 | Status: SHIPPED | OUTPATIENT
Start: 2022-10-12 | End: 2022-11-03

## 2022-10-12 RX ORDER — BUPROPION HYDROCHLORIDE 150 MG/1
150 TABLET, EXTENDED RELEASE ORAL DAILY
COMMUNITY
End: 2022-12-07

## 2022-10-12 RX ORDER — FAMOTIDINE 40 MG/1
40 TABLET, FILM COATED ORAL DAILY
COMMUNITY
Start: 2022-10-05

## 2022-10-12 ASSESSMENT — PATIENT HEALTH QUESTIONNAIRE - PHQ9
7. TROUBLE CONCENTRATING ON THINGS, SUCH AS READING THE NEWSPAPER OR WATCHING TELEVISION: NOT AT ALL
6. FEELING BAD ABOUT YOURSELF - OR THAT YOU ARE A FAILURE OR HAVE LET YOURSELF OR YOUR FAMILY DOWN: NOT AL ALL
SUM OF ALL RESPONSES TO PHQ9 QUESTIONS 1 AND 2: 0
9. THOUGHTS THAT YOU WOULD BE BETTER OFF DEAD, OR OF HURTING YOURSELF: NOT AT ALL
1. LITTLE INTEREST OR PLEASURE IN DOING THINGS: NOT AT ALL
3. TROUBLE FALLING OR STAYING ASLEEP OR SLEEPING TOO MUCH: NOT AT ALL
8. MOVING OR SPEAKING SO SLOWLY THAT OTHER PEOPLE COULD HAVE NOTICED. OR THE OPPOSITE, BEING SO FIGETY OR RESTLESS THAT YOU HAVE BEEN MOVING AROUND A LOT MORE THAN USUAL: NOT AT ALL
4. FEELING TIRED OR HAVING LITTLE ENERGY: NOT AT ALL
2. FEELING DOWN, DEPRESSED, IRRITABLE, OR HOPELESS: NOT AT ALL
5. POOR APPETITE OR OVEREATING: NOT AT ALL
SUM OF ALL RESPONSES TO PHQ QUESTIONS 1-9: 0

## 2022-10-12 ASSESSMENT — FIBROSIS 4 INDEX: FIB4 SCORE: 0.65

## 2022-10-12 NOTE — PROGRESS NOTES
"Virtual Visit: Established Patient   This visit was conducted via Zoom using secure and encrypted videoconferencing technology. The patient was in a private location in the state of [place].    The patient's identity was confirmed and verbal consent was obtained for this virtual visit.    Subjective:   CC:   Chief Complaint   Patient presents with    Results     Bone density       Allyn Rosenberg is a 67 y.o. female presenting for evaluation and management of:    Age-related osteoporosis without current pathological fracture  Chronic condition, unstable.  This is a new diagnosis for the patient.  She had a recent Dexa scan which revealed a -3.0 T-score for her lumbar spine and -1.7 in the left femur as detailed below. She does have osteoporosis of her lumbar spine and osteopenia of her left femur. Patient also has a high fracture risk of 34.4%.  Discussed with patient the risks and benefits of beginning bisphosphonate therapy today, and patient agrees.  She also requested referral to endocrinology for consultation and treatment recommendations as well.  Referral was placed to endocrine for patient today. In the meanwhile, patient was started on alendronate 70 mg weekly today.  She will follow up in one month to see how she is doing, sooner if she experiences any adverse side-effects.     DEXA scan 9/28/2022 IMPRESSION:     According to the World Health Organization classification, bone mineral density of this patient is osteoporotic in the spine and osteopenic in the proximal left femur.     10-year Probability of Fracture:  Major Osteoporotic     34.4%  Hip     8.5%  Population      [place] ()     Based on left femur neck BMD     ROS Denies any recent fevers or chills. No nausea or vomiting. No chest pains or shortness of breath.     Allergies   Allergen Reactions    Other Drug      Pt states allergic to \"myacins\"       Current medicines (including changes today)  Current Outpatient Medications   Medication Sig " "Dispense Refill    buPROPion SR (WELLBUTRIN-SR) 150 MG TABLET SR 12 HR sustained-release tablet Take 150 mg by mouth every day.      alendronate (FOSAMAX) 70 MG Tab Take 1 Tablet by mouth every 7 days. 4 Tablet 3    escitalopram (LEXAPRO) 10 MG Tab Take 1 Tablet by mouth every day. 90 Tablet 1    thyroid (ARMOUR THYROID) 15 MG Tab Take 1 tablet by mouth every day. 90 tablet 3    Cholecalciferol (VITAMIN D PO) Take 50,000 Units by mouth every 7 days.      famotidine (PEPCID) 40 MG Tab Take 40 mg by mouth every day.       No current facility-administered medications for this visit.       Patient Active Problem List    Diagnosis Date Noted    Age-related osteoporosis without current pathological fracture 10/12/2022    Low vitamin D level 05/04/2022    Mixed stress and urge urinary incontinence 05/03/2022    Genetic screening 05/03/2022    History of smoking 05/03/2022    Falls 09/20/2021    Recurrent major depressive disorder, in partial remission (HCC) 06/15/2021    Diverticulitis 06/15/2021    Urinary incontinence 02/03/2021    Dyslipidemia 01/29/2021    Acquired hypothyroidism 01/29/2021    Palpitations 01/29/2021       Family History   Problem Relation Age of Onset    Hypertension Father     Lung Disease Father     Other Father     Heart Disease Neg Hx        She  has no past medical history on file.  She  has no past surgical history on file.       Objective:   Resp 16   Ht 1.651 m (5' 5\") Comment: per pt  Wt 74.4 kg (164 lb) Comment: per pt  BMI 27.29 kg/m²     Physical Exam:  Constitutional: Alert, no distress, well-groomed.  Skin: No rashes in visible areas.  ENMT: Lips pink without lesions. Phonation normal.  Neck: No masses, no thyromegaly. Moves freely without pain.  Respiratory: Unlabored respiratory effort, no cough or audible wheeze  Psych: Alert and oriented x3, normal affect and mood.       Assessment and Plan:   The following treatment plan was discussed:     1. Age-related osteoporosis without " current pathological fracture  - Referral to Endocrinology  - alendronate (FOSAMAX) 70 MG Tab; Take 1 Tablet by mouth every 7 days.  Dispense: 4 Tablet; Refill: 3        Follow-up: Return in about 4 weeks (around 11/9/2022).

## 2022-10-12 NOTE — ASSESSMENT & PLAN NOTE
Chronic condition, unstable.  This is a new diagnosis for the patient.  She had a recent Dexa scan which revealed a -3.0 T-score for her lumbar spine and -1.7 in the left femur as detailed below. She does have osteoporosis of her lumbar spine and osteopenia of her left femur. Patient also has a high fracture risk of 34.4%.  Discussed with patient the risks and benefits of beginning bisphosphonate therapy today, and patient agrees.  She also requested referral to endocrinology for consultation and treatment recommendations as well.  Referral was placed to endocrine for patient today. In the meanwhile, patient was started on alendronate 70 mg weekly today.  She will follow up in one month to see how she is doing, sooner if she experiences any adverse side-effects.     DEXA scan 9/28/2022 IMPRESSION:     According to the World Health Organization classification, bone mineral density of this patient is osteoporotic in the spine and osteopenic in the proximal left femur.     10-year Probability of Fracture:  Major Osteoporotic     34.4%  Hip     8.5%  Population      [place] ()     Based on left femur neck BMD

## 2022-11-03 ENCOUNTER — OFFICE VISIT (OUTPATIENT)
Dept: URGENT CARE | Facility: CLINIC | Age: 67
End: 2022-11-03
Payer: MEDICARE

## 2022-11-03 VITALS
RESPIRATION RATE: 16 BRPM | SYSTOLIC BLOOD PRESSURE: 120 MMHG | TEMPERATURE: 97.2 F | HEART RATE: 72 BPM | HEIGHT: 65 IN | BODY MASS INDEX: 25.66 KG/M2 | WEIGHT: 154 LBS | DIASTOLIC BLOOD PRESSURE: 76 MMHG | OXYGEN SATURATION: 98 %

## 2022-11-03 DIAGNOSIS — M10.9 ACUTE GOUT INVOLVING TOE OF LEFT FOOT, UNSPECIFIED CAUSE: ICD-10-CM

## 2022-11-03 PROCEDURE — 99213 OFFICE O/P EST LOW 20 MIN: CPT | Performed by: FAMILY MEDICINE

## 2022-11-03 RX ORDER — PREDNISONE 20 MG/1
20 TABLET ORAL DAILY
Qty: 5 TABLET | Refills: 0 | Status: SHIPPED | OUTPATIENT
Start: 2022-11-03 | End: 2022-11-08

## 2022-11-03 ASSESSMENT — FIBROSIS 4 INDEX: FIB4 SCORE: 0.65

## 2022-11-04 NOTE — PROGRESS NOTES
"  Subjective:      67 y.o. female presents to urgent care for concerns of an infection to her left, third toe.  On Tuesday she woke up in the night with sudden pain and swelling to that toe, there was no inciting event or trauma at that time.  The pain has been constant since then, it is described as tight, currently rated 8/10.  She has been using Epson salt soaks, aspirin, and ibuprofen with only moderate relief in symptoms.    She denies any other questions or concerns at this time.    Current problem list, medication, and past medical/surgical history were reviewed in Epic.    ROS  See HPI     Objective:      /76 (BP Location: Left arm, Patient Position: Sitting, BP Cuff Size: Adult)   Pulse 72   Temp 36.2 °C (97.2 °F) (Temporal)   Resp 16   Ht 1.638 m (5' 4.5\")   Wt 69.9 kg (154 lb)   SpO2 98%   BMI 26.03 kg/m²     Physical Exam  Constitutional:       General: She is not in acute distress.     Appearance: She is not diaphoretic.   Cardiovascular:      Rate and Rhythm: Normal rate and regular rhythm.      Heart sounds: Normal heart sounds.   Pulmonary:      Effort: Pulmonary effort is normal. No respiratory distress.      Breath sounds: Normal breath sounds.   Skin:     Comments: Erythema and edema noted to left, third toe.  It is also extremely tender to palpation.  No open areas or discharge.   Neurological:      Mental Status: She is alert.   Psychiatric:         Mood and Affect: Affect normal.         Judgment: Judgment normal.     Assessment/Plan:     1. Acute gout involving toe of left foot, unspecified cause  Most consistent with gout.  Treatment with prednisone.  Tylenol as needed for symptomatic relief.  - predniSONE (DELTASONE) 20 MG Tab; Take 1 Tablet by mouth every day for 5 days.  Dispense: 5 Tablet; Refill: 0      Instructed to return to Urgent Care or nearest Emergency Department if symptoms fail to improve, for any change in condition, further concerns, or new concerning symptoms. " Patient states understanding of the plan of care and discharge instructions.    Diana Barton M.D.

## 2022-12-07 PROBLEM — M79.672 LEFT FOOT PAIN: Status: ACTIVE | Noted: 2022-12-07

## 2022-12-07 PROBLEM — F51.01 PRIMARY INSOMNIA: Status: ACTIVE | Noted: 2022-12-07

## 2022-12-07 PROBLEM — R00.2 PALPITATIONS: Status: RESOLVED | Noted: 2021-01-29 | Resolved: 2022-12-07

## 2022-12-07 PROBLEM — K21.9 GASTROESOPHAGEAL REFLUX DISEASE WITHOUT ESOPHAGITIS: Status: ACTIVE | Noted: 2022-12-07

## 2022-12-07 PROBLEM — M25.551 RIGHT HIP PAIN: Status: ACTIVE | Noted: 2022-12-07

## 2022-12-07 PROBLEM — H25.093 AGE-RELATED INCIPIENT CATARACT OF BOTH EYES: Status: ACTIVE | Noted: 2022-12-07

## 2022-12-07 PROBLEM — F33.42 MDD (MAJOR DEPRESSIVE DISORDER), RECURRENT, IN FULL REMISSION (HCC): Status: ACTIVE | Noted: 2021-06-15

## 2022-12-07 PROBLEM — M70.61 GREATER TROCHANTERIC BURSITIS OF RIGHT HIP: Status: ACTIVE | Noted: 2022-12-07

## 2022-12-29 ENCOUNTER — PATIENT MESSAGE (OUTPATIENT)
Dept: HEALTH INFORMATION MANAGEMENT | Facility: OTHER | Age: 67
End: 2022-12-29

## 2023-02-17 ENCOUNTER — OFFICE VISIT (OUTPATIENT)
Dept: ENDOCRINOLOGY | Facility: MEDICAL CENTER | Age: 68
End: 2023-02-17
Payer: MEDICARE

## 2023-02-17 VITALS
SYSTOLIC BLOOD PRESSURE: 119 MMHG | HEIGHT: 66 IN | DIASTOLIC BLOOD PRESSURE: 51 MMHG | BODY MASS INDEX: 24.59 KG/M2 | OXYGEN SATURATION: 96 % | WEIGHT: 153 LBS | HEART RATE: 78 BPM

## 2023-02-17 DIAGNOSIS — M81.0 POST-MENOPAUSAL OSTEOPOROSIS: ICD-10-CM

## 2023-02-17 DIAGNOSIS — Z83.3 FAMILY HISTORY OF DIABETES MELLITUS: ICD-10-CM

## 2023-02-17 DIAGNOSIS — E66.3 OVERWEIGHT (BMI 25.0-29.9): ICD-10-CM

## 2023-02-17 DIAGNOSIS — E55.9 VITAMIN D DEFICIENCY: ICD-10-CM

## 2023-02-17 DIAGNOSIS — Z13.29 SCREENING FOR THYROID DISORDER: ICD-10-CM

## 2023-02-17 PROCEDURE — 99214 OFFICE O/P EST MOD 30 MIN: CPT

## 2023-02-17 PROCEDURE — 99211 OFF/OP EST MAY X REQ PHY/QHP: CPT

## 2023-02-17 NOTE — PROGRESS NOTES
Chief Complaint: Consult requested by ELDER Chang for evaluation of Osteoporosis/Osteopenia/Bone Loss    Subjective:    Allyn Rosenberg is a 68 y.o. female     Postmenopausal Osteoporosis:   Pt was losing bone to her teeth and evaluation for Osteoporosis was done   Patient admits to history of fracture due to falls.   Patient admits to history of height loss.     The cause of osteoporosis is felt to be due to postmenopausal estrogen deficiency and dietary calcium and/or vitamin D deficiency.   She is currently being treated with calcium-1800 mg  and vitamin D3-21289 weekly supplementation.   She is not currently being treated with bisphosphonate  Tried and failed Fosamax      Bone Density 9/28/2022: Spine T Score: -3.0, Hip T Score: -0.4     Osteoporosis Risk Factors   Nonmodifiable  Personal Hx of fracture as an adult: yes  Hx of fracture in first-degree relative: no   race: yes  Advanced age: yes  Female sex: yes  Dementia: no  Poor health/frailty: no     Potentially modifiable:  Tobacco use: no  Low body weight (<127 lbs): no  Estrogen deficiency     early menopause (age <45) or bilateral ovariectomy: yes-surgical menopause at 35s     prolonged premenopausal amenorrhea (>1 yr): no  Low calcium intake (lifelong): no  Alcoholism: no  Recurrent falls: no  physical activity: walking with granddaughter     2. Overweight:  Up to 25lbs for the past 2 years  Despite diet and exercise     3. Vitamin D defienciency:   Currently taking 06926 weekly     4.  Screening for thyroid disorder:  Reports weight gain for the past 3 years    5.  Family history of diabetes mellitus:  Reports first-degree family history of diabetes    Patient's medications, allergies, and social histories were reviewed and updated as appropriate.  ROS:     CONS:     No fever, no chills, no weight loss, no fatigue   EYES:      No diplopia, no blurry vision, no redness of eyes, no swelling of eyelids   ENT:    No hearing loss, No ear pain,  No sore throat, no dysphagia, no neck swelling   CV:     No chest pain, no palpitations, no claudication, no orthopnea, no PND   PULM:    No SOB, no cough, no hemoptysis, no wheezing    GI:   No nausea, no vomiting, no diarrhea, no constipation, no bloody stools   :  Passing urine well, no dysuria, no hematuria   ENDO:   No polyuria, no polydipsia, no heat intolerance, no cold intolerance   NEURO: No headaches, no dizziness, no convulsions, no tremors   MUSC:  No joint swellings, no arthralgias, no myalgias, no weakness   SKIN:   No rash, no ulcers, no dry skin   PSYCH:   No depression, no anxiety, no difficulty sleeping       Past Medical History:  Patient Active Problem List    Diagnosis Date Noted    Primary insomnia 12/07/2022    Gastroesophageal reflux disease without esophagitis 12/07/2022    Right hip pain 12/07/2022    Left foot pain 12/07/2022    Greater trochanteric bursitis of right hip 12/07/2022    Age-related incipient cataract of both eyes 12/07/2022    Age-related osteoporosis without current pathological fracture 10/12/2022    Low vitamin D level 05/04/2022    Mixed stress and urge urinary incontinence 05/03/2022    Genetic screening 05/03/2022    History of smoking 05/03/2022    Falls 09/20/2021    MDD (major depressive disorder), recurrent, in full remission (HCC) 06/15/2021    Diverticulitis 06/15/2021    Urinary incontinence 02/03/2021    Dyslipidemia 01/29/2021    Acquired hypothyroidism 01/29/2021       Past Surgical History:  Past Surgical History:   Procedure Laterality Date    ABDOMINAL HYSTERECTOMY TOTAL      BRCA1&2 GEN FULL SEQ DUP/DEL      HYSTERECTOMY, TOTAL ABDOMINAL Bilateral     PRIMARY C SECTION      TONSILLECTOMY AND ADENOIDECTOMY Bilateral     TUBAL COAGULATION LAPAROSCOPIC BILATERAL          Allergies:  Erythromycin and Other drug     Current Medications:    Current Outpatient Medications:     escitalopram (LEXAPRO) 10 MG Tab, Take 1 Tablet by mouth every day., Disp: 90  "Tablet, Rfl: 1    buPROPion (WELLBUTRIN XL) 150 MG XL tablet, Take 1 Tablet by mouth every morning., Disp: 90 Tablet, Rfl: 3    thyroid (ARMOUR THYROID) 15 MG Tab, Take 1 Tablet by mouth every 48 hours., Disp: 45 Tablet, Rfl: 3    Specialty Vitamins Products (ONE-A-DAY BONE STRENGTH PO), Take 3 Tablets by mouth every day., Disp: , Rfl:     famotidine (PEPCID) 40 MG Tab, Take 40 mg by mouth every day., Disp: , Rfl:     Cholecalciferol (VITAMIN D PO), Take 50,000 Units by mouth every 7 days., Disp: , Rfl:     Social History:  Social History     Socioeconomic History    Marital status:      Spouse name: Not on file    Number of children: Not on file    Years of education: Not on file    Highest education level: Not on file   Occupational History    Not on file   Tobacco Use    Smoking status: Former     Packs/day: 0.50     Years: 10.00     Pack years: 5.00     Types: Cigarettes    Smokeless tobacco: Never   Vaping Use    Vaping Use: Never used   Substance and Sexual Activity    Alcohol use: Yes     Alcohol/week: 1.8 oz     Types: 3 Glasses of wine per week    Drug use: No    Sexual activity: Not Currently   Other Topics Concern    Not on file   Social History Narrative    Not on file     Social Determinants of Health     Financial Resource Strain: Not on file   Food Insecurity: Not on file   Transportation Needs: Not on file   Physical Activity: Not on file   Stress: Not on file   Social Connections: Not on file   Intimate Partner Violence: Not on file   Housing Stability: Not on file        Family History:   Family History   Problem Relation Age of Onset    Hypertension Father     Lung Disease Father     Other Father     Heart Disease Neg Hx          PHYSICAL EXAM:   Vital signs: /51 (BP Location: Left arm, Patient Position: Sitting, BP Cuff Size: Adult long)   Pulse 78   Ht 1.664 m (5' 5.5\")   Wt 69.4 kg (153 lb)   SpO2 96%   BMI 25.07 kg/m²   GENERAL: Well-developed, well-nourished  in no apparent " distress.   EYE: No ocular and eyelid asymmetry, Anicteric sclerae,  PERRL, No exophthalmos or lidlag  HENT: Hearing grossly intact, Normocephalic, atraumatic.   NECK: Supple. Trachea midline. thyroid is normal in size without nodules or tenderness  CARDIOVASCULAR: Regular rate and rhythm. No murmurs, rubs, or gallops.   LUNGS: Clear to auscultation bilaterally   EXTREMITIES: No clubbing, cyanosis, or edema.   NEUROLOGICAL: Cranial nerves II-XII are grossly intact   Symmetric reflexes at the patella no proximal muscle weakness, No visible tremor with both outstretched hands  LYMPH: No cervical, supraclavicular,  adenopathy palpated.   SKIN: No rashes, lesions. Turgor is normal.    Labs:  Lab Results   Component Value Date/Time    TSHULTRASEN 2.780 02/03/2021 1206     Lab Results   Component Value Date/Time    FREET4 0.94 08/24/2016 1236       Imaging:      ASSESSMENT/PLAN:   1. Post-menopausal osteoporosis  Unstable  Baseline bone density patient is a candidate for an anabolic agent  Anabolic agent such as Tymlos or Forteo as well as Evenity- (sclerotin inhibitor) discussed with patient, benefits and risk  We also discussed osteoporosis, pathophysiology, rationale for treatment, risks and benefits of treatment  We will conduct the following evaluation before determining the appropriate treatment for patient with an anabolic agents vs an sclerotin inhibitor  Patient will continue calcium and vitamin D supplementation  Patient will prevent falls by wearing appropriate shoes    - SPEP W/REFLEX TO SHELLY, A, G, M; Future  - SERUM PROTEIN ELECTROPHORESIS; Future  - PTH INTACT (PTH ONLY); Future  - PHOSPHORUS; Future  - OSTEOCALCIN, SERUM; Future  - CTX COLLAGEN TYPE 1; Future  - Comp Metabolic Panel; Future    2. Overweight (BMI 25.0-29.9)  Unstable  Referred to nutritional services for weight loss diet education  Discussed to exercise at least 30 minutes/day, increasing her heart rate  - Referral to Nutrition  Services    3. Vitamin D deficiency  I will evaluate levels  Continue regimen  - VITAMIN D,25 HYDROXY (DEFICIENCY); Future    4. Screening for thyroid disorder  Unstable  - FREE THYROXINE; Future  - TSH; Future    5. Family history of diabetes mellitus  Unstable  - HEMOGLOBIN A1C; Future     Disposition: Make an appointment to follow-up in 3 months  Please do blood work 2 weeks prior to next appointment    Thank you kindly for allowing me to participate in the endocrine care plan for this patient.    ELDER Hyatt  02/17/23    CC:   ELDER Chang

## 2023-02-18 ENCOUNTER — HOSPITAL ENCOUNTER (OUTPATIENT)
Dept: LAB | Facility: MEDICAL CENTER | Age: 68
End: 2023-02-18
Payer: MEDICARE

## 2023-02-18 DIAGNOSIS — E55.9 VITAMIN D DEFICIENCY: ICD-10-CM

## 2023-02-18 DIAGNOSIS — M81.0 POST-MENOPAUSAL OSTEOPOROSIS: ICD-10-CM

## 2023-02-18 DIAGNOSIS — Z13.29 SCREENING FOR THYROID DISORDER: ICD-10-CM

## 2023-02-18 DIAGNOSIS — Z83.3 FAMILY HISTORY OF DIABETES MELLITUS: ICD-10-CM

## 2023-02-18 LAB
25(OH)D3 SERPL-MCNC: 31 NG/ML (ref 30–100)
ALBUMIN SERPL BCP-MCNC: 4.5 G/DL (ref 3.2–4.9)
ALBUMIN/GLOB SERPL: 1.4 G/DL
ALP SERPL-CCNC: 109 U/L (ref 30–99)
ALT SERPL-CCNC: 17 U/L (ref 2–50)
ANION GAP SERPL CALC-SCNC: 11 MMOL/L (ref 7–16)
AST SERPL-CCNC: 15 U/L (ref 12–45)
BILIRUB SERPL-MCNC: 0.3 MG/DL (ref 0.1–1.5)
BUN SERPL-MCNC: 12 MG/DL (ref 8–22)
CALCIUM ALBUM COR SERPL-MCNC: 9.4 MG/DL (ref 8.5–10.5)
CALCIUM SERPL-MCNC: 9.8 MG/DL (ref 8.5–10.5)
CHLORIDE SERPL-SCNC: 103 MMOL/L (ref 96–112)
CO2 SERPL-SCNC: 26 MMOL/L (ref 20–33)
CREAT SERPL-MCNC: 0.72 MG/DL (ref 0.5–1.4)
EST. AVERAGE GLUCOSE BLD GHB EST-MCNC: 114 MG/DL
GFR SERPLBLD CREATININE-BSD FMLA CKD-EPI: 90 ML/MIN/1.73 M 2
GLOBULIN SER CALC-MCNC: 3.2 G/DL (ref 1.9–3.5)
GLUCOSE SERPL-MCNC: 84 MG/DL (ref 65–99)
HBA1C MFR BLD: 5.6 % (ref 4–5.6)
PHOSPHATE SERPL-MCNC: 3.7 MG/DL (ref 2.5–4.5)
POTASSIUM SERPL-SCNC: 4.6 MMOL/L (ref 3.6–5.5)
PROT SERPL-MCNC: 7.7 G/DL (ref 6–8.2)
PTH-INTACT SERPL-MCNC: 71.7 PG/ML (ref 14–72)
SODIUM SERPL-SCNC: 140 MMOL/L (ref 135–145)
T4 FREE SERPL-MCNC: 1.16 NG/DL (ref 0.93–1.7)
TSH SERPL DL<=0.005 MIU/L-ACNC: 2.93 UIU/ML (ref 0.38–5.33)

## 2023-02-18 PROCEDURE — 84165 PROTEIN E-PHORESIS SERUM: CPT

## 2023-02-18 PROCEDURE — 83937 ASSAY OF OSTEOCALCIN: CPT

## 2023-02-18 PROCEDURE — 83970 ASSAY OF PARATHORMONE: CPT

## 2023-02-18 PROCEDURE — 83036 HEMOGLOBIN GLYCOSYLATED A1C: CPT

## 2023-02-18 PROCEDURE — 84443 ASSAY THYROID STIM HORMONE: CPT

## 2023-02-18 PROCEDURE — 84439 ASSAY OF FREE THYROXINE: CPT

## 2023-02-18 PROCEDURE — 84155 ASSAY OF PROTEIN SERUM: CPT

## 2023-02-18 PROCEDURE — 36415 COLL VENOUS BLD VENIPUNCTURE: CPT

## 2023-02-18 PROCEDURE — 84100 ASSAY OF PHOSPHORUS: CPT

## 2023-02-18 PROCEDURE — 80053 COMPREHEN METABOLIC PANEL: CPT

## 2023-02-18 PROCEDURE — 82523 COLLAGEN CROSSLINKS: CPT

## 2023-02-18 PROCEDURE — 82306 VITAMIN D 25 HYDROXY: CPT

## 2023-02-20 LAB — COLLAGEN CTX SERPL-MCNC: 487 PG/ML

## 2023-02-21 LAB — MISCELLANEOUS LAB RESULT MISCLAB: NORMAL

## 2023-02-22 LAB
ALBUMIN SERPL ELPH-MCNC: 4.12 G/DL (ref 3.75–5.01)
ALPHA1 GLOB SERPL ELPH-MCNC: 0.37 G/DL (ref 0.19–0.46)
ALPHA2 GLOB SERPL ELPH-MCNC: 0.91 G/DL (ref 0.48–1.05)
B-GLOBULIN SERPL ELPH-MCNC: 1.01 G/DL (ref 0.48–1.1)
GAMMA GLOB SERPL ELPH-MCNC: 0.9 G/DL (ref 0.62–1.51)
INTERPRETATION SERPL IFE-IMP: NORMAL
MONOCLON BAND OBS SERPL: NORMAL
MONOCLONAL PROTEIN NL11656: NORMAL G/DL
PATHOLOGY STUDY: NORMAL
PROT SERPL-MCNC: 7.3 G/DL (ref 6.3–8.2)

## 2023-03-08 PROBLEM — Z86.19 H/O COLD SORES: Status: ACTIVE | Noted: 2023-03-08

## 2023-03-28 PROBLEM — K22.2 SCHATZKI'S RING: Status: ACTIVE | Noted: 2023-03-28

## 2023-03-28 PROBLEM — K44.9 HIATAL HERNIA: Status: ACTIVE | Noted: 2023-03-28

## 2023-04-10 ENCOUNTER — TELEPHONE (OUTPATIENT)
Dept: HEALTH INFORMATION MANAGEMENT | Facility: OTHER | Age: 68
End: 2023-04-10
Payer: MEDICARE

## 2023-05-08 ENCOUNTER — APPOINTMENT (OUTPATIENT)
Dept: ENDOCRINOLOGY | Facility: MEDICAL CENTER | Age: 68
End: 2023-05-08
Payer: MEDICARE

## 2023-06-12 ENCOUNTER — OFFICE VISIT (OUTPATIENT)
Dept: ENDOCRINOLOGY | Facility: MEDICAL CENTER | Age: 68
End: 2023-06-12
Payer: MEDICARE

## 2023-06-12 VITALS
HEART RATE: 84 BPM | DIASTOLIC BLOOD PRESSURE: 60 MMHG | BODY MASS INDEX: 26.23 KG/M2 | WEIGHT: 157.4 LBS | HEIGHT: 65 IN | OXYGEN SATURATION: 99 % | SYSTOLIC BLOOD PRESSURE: 100 MMHG

## 2023-06-12 DIAGNOSIS — E55.9 VITAMIN D DEFICIENCY: ICD-10-CM

## 2023-06-12 DIAGNOSIS — E66.3 OVERWEIGHT (BMI 25.0-29.9): ICD-10-CM

## 2023-06-12 DIAGNOSIS — Z13.29 SCREENING FOR THYROID DISORDER: ICD-10-CM

## 2023-06-12 DIAGNOSIS — Z83.3 FAMILY HISTORY OF DIABETES MELLITUS: ICD-10-CM

## 2023-06-12 DIAGNOSIS — M81.0 POSTMENOPAUSAL OSTEOPOROSIS: ICD-10-CM

## 2023-06-12 PROCEDURE — 99211 OFF/OP EST MAY X REQ PHY/QHP: CPT

## 2023-06-12 PROCEDURE — 3078F DIAST BP <80 MM HG: CPT

## 2023-06-12 PROCEDURE — RXMED WILLOW AMBULATORY MEDICATION CHARGE

## 2023-06-12 PROCEDURE — 3074F SYST BP LT 130 MM HG: CPT

## 2023-06-12 PROCEDURE — 99214 OFFICE O/P EST MOD 30 MIN: CPT

## 2023-06-12 RX ORDER — ABALOPARATIDE 2000 UG/ML
80 INJECTION, SOLUTION SUBCUTANEOUS NIGHTLY
Qty: 1.2 ML | Refills: 6 | Status: SHIPPED | OUTPATIENT
Start: 2023-06-12 | End: 2023-06-12 | Stop reason: SDUPTHER

## 2023-06-12 RX ORDER — PEN NEEDLE, DIABETIC 32GX 5/32"
1 NEEDLE, DISPOSABLE MISCELLANEOUS DAILY
Qty: 100 EACH | Refills: 11 | Status: SHIPPED | OUTPATIENT
Start: 2023-06-12

## 2023-06-12 RX ORDER — ABALOPARATIDE 2000 UG/ML
80 INJECTION, SOLUTION SUBCUTANEOUS NIGHTLY
Qty: 1.56 ML | Refills: 6 | Status: SHIPPED | OUTPATIENT
Start: 2023-06-12 | End: 2024-03-06 | Stop reason: SDUPTHER

## 2023-06-13 ENCOUNTER — DOCUMENTATION (OUTPATIENT)
Dept: PHARMACY | Facility: MEDICAL CENTER | Age: 68
End: 2023-06-13
Payer: MEDICARE

## 2023-06-13 NOTE — PROGRESS NOTES
**New Onboarding**  Diagnosis: Postmenopausal osteoporosis [M81.0]      Drug & Non-Drug Allergies:    - EMR Reviewed. No concerning drug or non-drug allergies.                                                                                          Drug Therapy (name/formulation/dose/route of admin/frequency): Tymlos 3120 mcg/1.56mL pen, inject 80mcg SQ every evening   EMR Reviewed   - Dose Appropriateness (Y/N): Y    - Renal or Hepatic Adjustments (Y/N): N   - Any comorbidities, PMH, precautions, or contraindications that pose a medication safety concern? (Y/N): N   - Any relevant lab work needed that affects the initial start date? (Y/N): N        - List Past Treatments: calcium, vitamin D3, Fosamax    - List DDI’s: no clinically signficant DDIs   - Patient’s ability to self-administer medication    - No issues identified per EMR       List Goals of Therapy:       To reduce bone loss, fracture, bone pain, and disability    To minimize risk of osteoporosis related morbidity and mortality    To minimize pharmacotherapy side effects   To promote a nutritious and well - balanced diet    To encourage physical therapy in order to move safely and prevent falls    Patient filling Tymlos with [facility]. Opted out of clinical services, received clinic education. Clinical pharmacists available to assist with support if needed.

## 2023-06-16 ENCOUNTER — PHARMACY VISIT (OUTPATIENT)
Dept: PHARMACY | Facility: MEDICAL CENTER | Age: 68
End: 2023-06-16
Payer: MEDICARE

## 2023-06-23 ENCOUNTER — HOSPITAL ENCOUNTER (OUTPATIENT)
Dept: LAB | Facility: MEDICAL CENTER | Age: 68
End: 2023-06-23
Payer: MEDICARE

## 2023-06-23 LAB
25(OH)D3 SERPL-MCNC: 42 NG/ML (ref 30–100)
CHOLEST SERPL-MCNC: 310 MG/DL (ref 100–199)
ERYTHROCYTE [DISTWIDTH] IN BLOOD BY AUTOMATED COUNT: 45.3 FL (ref 35.9–50)
HCT VFR BLD AUTO: 42.5 % (ref 37–47)
HDLC SERPL-MCNC: 65 MG/DL
HGB BLD-MCNC: 14.3 G/DL (ref 12–16)
LDLC SERPL CALC-MCNC: 205 MG/DL
MCH RBC QN AUTO: 31.5 PG (ref 27–33)
MCHC RBC AUTO-ENTMCNC: 33.6 G/DL (ref 32.2–35.5)
MCV RBC AUTO: 93.6 FL (ref 81.4–97.8)
PLATELET # BLD AUTO: 325 K/UL (ref 164–446)
PMV BLD AUTO: 10.3 FL (ref 9–12.9)
RBC # BLD AUTO: 4.54 M/UL (ref 4.2–5.4)
TRIGL SERPL-MCNC: 198 MG/DL (ref 0–149)
WBC # BLD AUTO: 6.6 K/UL (ref 4.8–10.8)

## 2023-06-23 PROCEDURE — 36415 COLL VENOUS BLD VENIPUNCTURE: CPT

## 2023-06-23 PROCEDURE — 80061 LIPID PANEL: CPT

## 2023-06-23 PROCEDURE — 85027 COMPLETE CBC AUTOMATED: CPT

## 2023-06-23 PROCEDURE — 82306 VITAMIN D 25 HYDROXY: CPT

## 2023-06-27 PROBLEM — N39.46 MIXED STRESS AND URGE URINARY INCONTINENCE: Chronic | Status: ACTIVE | Noted: 2022-05-03

## 2023-06-27 PROBLEM — M25.551 RIGHT HIP PAIN: Chronic | Status: ACTIVE | Noted: 2022-12-07

## 2023-06-27 PROBLEM — K21.9 GASTROESOPHAGEAL REFLUX DISEASE WITHOUT ESOPHAGITIS: Chronic | Status: ACTIVE | Noted: 2022-12-07

## 2023-06-27 PROBLEM — F33.42 MDD (MAJOR DEPRESSIVE DISORDER), RECURRENT, IN FULL REMISSION (HCC): Chronic | Status: ACTIVE | Noted: 2021-06-15

## 2023-06-27 PROBLEM — M81.0 AGE-RELATED OSTEOPOROSIS WITHOUT CURRENT PATHOLOGICAL FRACTURE: Chronic | Status: ACTIVE | Noted: 2022-10-12

## 2023-06-27 PROBLEM — F51.01 PRIMARY INSOMNIA: Chronic | Status: ACTIVE | Noted: 2022-12-07

## 2023-06-30 PROBLEM — R29.6 FALLS: Status: RESOLVED | Noted: 2021-09-20 | Resolved: 2023-06-30

## 2023-06-30 PROBLEM — D12.0 BENIGN NEOPLASM OF CECUM: Chronic | Status: ACTIVE | Noted: 2023-03-17
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
## 2023-06-30 PROBLEM — D12.0 BENIGN NEOPLASM OF CECUM: Status: ACTIVE | Noted: 2023-03-17

## 2023-07-11 ENCOUNTER — TELEPHONE (OUTPATIENT)
Dept: PHARMACY | Facility: MEDICAL CENTER | Age: 68
End: 2023-07-11
Payer: MEDICARE

## 2023-07-11 ENCOUNTER — PHARMACY VISIT (OUTPATIENT)
Dept: PHARMACY | Facility: MEDICAL CENTER | Age: 68
End: 2023-07-11
Payer: MEDICARE

## 2023-07-11 PROCEDURE — RXMED WILLOW AMBULATORY MEDICATION CHARGE

## 2023-07-11 NOTE — TELEPHONE ENCOUNTER
Patient left me a voice message regarding her TYMLOS 3120 MCG/1.56ML Solution Pen-injector she stated she will be out of town until 08/15/23 and no one will be home to receive the delivery she stated to go ahead and deliver the medication on 08/15/23, I will set the next call to 08/14/23 to go over the cost $610.78 not sure if she is aware of the price change [place].    Spoke with patient asked her if she was ready for us to refill her Tymlos 3120 MCG/1.56 Solution Pen she stated yes she only has 6 does left I verified her delivery address and her CC on file went over her copay $224.91 told her she is in the [place] she understood delivery set for 07/12/23.    Anastasia Connell, Pharmacy Liasion/RX Coordinator

## 2023-08-04 ENCOUNTER — TELEPHONE (OUTPATIENT)
Dept: PHARMACY | Facility: MEDICAL CENTER | Age: 68
End: 2023-08-04
Payer: MEDICARE

## 2023-08-04 ENCOUNTER — TELEPHONE (OUTPATIENT)
Dept: PHARMACY | Facility: MEDICAL CENTER | Age: 68
End: 2023-08-04

## 2023-08-04 NOTE — TELEPHONE ENCOUNTER
I received a call from Allyn she wanted to make sure we are not sending out the TYMLOS 3120 MCG/1.56ML Solution Pen-injector as she will be out of town till 8-14-23 She did request I run a test claim to verify cost $610.78/30ds I explained to Allyn she is in the donut hole and what the donut hole is. She expressed understanding and requested her refill call be on 8/15/23    Thuy  RX Coordinator

## 2023-08-17 ENCOUNTER — TELEPHONE (OUTPATIENT)
Dept: PHARMACY | Facility: MEDICAL CENTER | Age: 68
End: 2023-08-17
Payer: MEDICARE

## 2023-08-17 PROCEDURE — RXMED WILLOW AMBULATORY MEDICATION CHARGE

## 2023-08-17 NOTE — TELEPHONE ENCOUNTER
Contact:      Phone number: [phone], Mobile    Name of person spoken with and relationship to patient: Allyn Rosenberg, Self   Patient’s Adherence:      How patient is doing on medication: Well    How many missed doses and reason: Yes, forgot while on vacation    Any new medications: No    Any new conditions: No    Any new allergies: No    Any new side effects: No    Any new diagnoses: No    How many doses remaining: 10    Did patient want to speak with pharmacist: No  Delivery:      Delivery date and method: 08/22 via     Needs by Date: N/A    Signature required: No    Any additional details for : None   Teach Appointment Date: 06/12/2023  Shipping Address: [address] [place] [id number]  Medication (name, strength and dose): Tymlos 3120mcg/1.56mL-80mcg qd  Copay: $610.78 (donut hole)  Payment Method: [facility]  Supplies: None  Additional Information: Pt reports missing two doses while on vacation but resumed the next morning. Next call date: 09/14.

## 2023-08-22 ENCOUNTER — PHARMACY VISIT (OUTPATIENT)
Dept: PHARMACY | Facility: MEDICAL CENTER | Age: 68
End: 2023-08-22
Payer: MEDICARE

## 2023-08-30 ENCOUNTER — HOSPITAL ENCOUNTER (OUTPATIENT)
Dept: LAB | Facility: MEDICAL CENTER | Age: 68
End: 2023-08-30
Payer: MEDICARE

## 2023-08-30 DIAGNOSIS — E55.9 VITAMIN D DEFICIENCY: ICD-10-CM

## 2023-08-30 DIAGNOSIS — M81.0 POSTMENOPAUSAL OSTEOPOROSIS: ICD-10-CM

## 2023-08-30 LAB
25(OH)D3 SERPL-MCNC: 48 NG/ML (ref 30–100)
ALBUMIN SERPL BCP-MCNC: 4.5 G/DL (ref 3.2–4.9)
ALBUMIN/GLOB SERPL: 1.5 G/DL
ALP SERPL-CCNC: 98 U/L (ref 30–99)
ALT SERPL-CCNC: 17 U/L (ref 2–50)
ANION GAP SERPL CALC-SCNC: 12 MMOL/L (ref 7–16)
AST SERPL-CCNC: 16 U/L (ref 12–45)
BILIRUB SERPL-MCNC: 0.5 MG/DL (ref 0.1–1.5)
BUN SERPL-MCNC: 18 MG/DL (ref 8–22)
CALCIUM ALBUM COR SERPL-MCNC: 9.5 MG/DL (ref 8.5–10.5)
CALCIUM SERPL-MCNC: 9.9 MG/DL (ref 8.5–10.5)
CHLORIDE SERPL-SCNC: 102 MMOL/L (ref 96–112)
CO2 SERPL-SCNC: 23 MMOL/L (ref 20–33)
CREAT SERPL-MCNC: 0.83 MG/DL (ref 0.5–1.4)
GFR SERPLBLD CREATININE-BSD FMLA CKD-EPI: 76 ML/MIN/1.73 M 2
GLOBULIN SER CALC-MCNC: 3 G/DL (ref 1.9–3.5)
GLUCOSE SERPL-MCNC: 91 MG/DL (ref 65–99)
PHOSPHATE SERPL-MCNC: 3.6 MG/DL (ref 2.5–4.5)
POTASSIUM SERPL-SCNC: 4 MMOL/L (ref 3.6–5.5)
PROT SERPL-MCNC: 7.5 G/DL (ref 6–8.2)
PTH-INTACT SERPL-MCNC: 52.7 PG/ML (ref 14–72)
SODIUM SERPL-SCNC: 137 MMOL/L (ref 135–145)
T4 FREE SERPL-MCNC: 1.13 NG/DL (ref 0.93–1.7)

## 2023-08-30 PROCEDURE — 80053 COMPREHEN METABOLIC PANEL: CPT

## 2023-08-30 PROCEDURE — 83970 ASSAY OF PARATHORMONE: CPT

## 2023-08-30 PROCEDURE — 84439 ASSAY OF FREE THYROXINE: CPT

## 2023-08-30 PROCEDURE — 36415 COLL VENOUS BLD VENIPUNCTURE: CPT

## 2023-08-30 PROCEDURE — 84100 ASSAY OF PHOSPHORUS: CPT

## 2023-08-30 PROCEDURE — 82306 VITAMIN D 25 HYDROXY: CPT

## 2023-09-01 ENCOUNTER — OFFICE VISIT (OUTPATIENT)
Dept: ENDOCRINOLOGY | Facility: MEDICAL CENTER | Age: 68
End: 2023-09-01
Payer: MEDICARE

## 2023-09-01 VITALS
BODY MASS INDEX: 25.46 KG/M2 | HEIGHT: 65 IN | HEART RATE: 86 BPM | DIASTOLIC BLOOD PRESSURE: 66 MMHG | WEIGHT: 152.8 LBS | SYSTOLIC BLOOD PRESSURE: 100 MMHG | OXYGEN SATURATION: 97 %

## 2023-09-01 DIAGNOSIS — E03.9 HYPOTHYROIDISM, ACQUIRED: ICD-10-CM

## 2023-09-01 DIAGNOSIS — E55.9 VITAMIN D DEFICIENCY: ICD-10-CM

## 2023-09-01 DIAGNOSIS — M81.0 POSTMENOPAUSAL OSTEOPOROSIS: ICD-10-CM

## 2023-09-01 DIAGNOSIS — E66.3 OVERWEIGHT (BMI 25.0-29.9): ICD-10-CM

## 2023-09-01 PROCEDURE — 99211 OFF/OP EST MAY X REQ PHY/QHP: CPT

## 2023-09-01 PROCEDURE — 3078F DIAST BP <80 MM HG: CPT

## 2023-09-01 PROCEDURE — 3074F SYST BP LT 130 MM HG: CPT

## 2023-09-01 PROCEDURE — 99215 OFFICE O/P EST HI 40 MIN: CPT

## 2023-09-01 ASSESSMENT — FIBROSIS 4 INDEX: FIB4 SCORE: 0.82

## 2023-09-01 NOTE — PROGRESS NOTES
Chief Complaint: Follow up for the following conditions   Subjective:   Allyn Rosenberg is a 68 y.o. female     Postmenopausal Osteoporosis:   Pt was losing bone on her teeth and evaluation for Osteoporosis was done   Patient admits to history of fracture a few years ago due to twisting ankle while she was hinking L .      The cause of osteoporosis is felt to be due to postmenopausal estrogen deficiency and dietary calcium and/or vitamin D deficiency.   She is currently being treated with calcium-1800 mg  and vitamin D3-43904 weekly supplementation-bone strength  Good amounts of green leafy vegetables  Pt obtain a vibrating machine to help with esau bearing, she also walks the dog   Tymlos was prescribed on last appointment -taking for the past 3 months -began around June of 2023 at [facility]       Tried and failed Fosamax - SE of severe joint pain   Done on 9/28/2022      Recurrent falls: no  Physical activity: walking with granddaughter      Latest Reference Range & Units 08/30/23 07:50   GFR (CKD-EPI) >60 mL/min/1.73 m 2 76      Latest Reference Range & Units 08/30/23 07:50   Pth, Intact 14.0 - 72.0 pg/mL 52.7      Latest Reference Range & Units 02/18/23 09:18   C. Telopeptide B Cross Linked pg/mL 487      Latest Reference Range & Units 02/18/23 09:18   Pth, Intact 14.0 - 72.0 pg/mL 71.7     SPEP W/REFLEX TO JAMES BRAVO G, M  Order: [id number]  Status: Final result     Visible to patient: Yes (seen)     Next appt: Today at 10:10 AM in Endocrinology (Jai Rudolph, A.P.R.N.)     Dx: Post-menopausal osteoporosis     0 Result Notes       Component Ref Range & Units 3 mo ago   Albumin 3.75 - 5.01 g/dL 4.12    Alpha-1 Globulin 0.19 - 0.46 g/dL 0.37    Alpha-2 Globulin 0.48 - 1.05 g/dL 0.91    Beta Globulin 0.48 - 1.10 g/dL 1.01    Gamma Globulin 0.62 - 1.51 g/dL 0.90    Monoclonal Protein g/dL Not Applicable    Interpretation  See Note    Comment: Normal SPEP pattern.        Miscellaneous Lab Result  REFERENCE  [email or web address]  Collected: 02/18/23 0919   Result status: Final   Resulting lab: [facility]   Value: SEE NOTE   Comment: Test name                     Result Flag Units  RefIntvl   -------------------------------------------------------------   Osteocalcin by [facility]               14       ng/mL 8-36     2. Overweight:  Up to 25lbs for the past 2 years  Despite diet and exercise     3. Vitamin D defienciency:   Currently taking 06885 weekly    Latest Reference Range & Units 08/30/23 07:50   25-Hydroxy   Vitamin D 25 30 - 100 ng/mL 48     4. Hypothyroidism, Acquired:  FV by pcp    Latest Reference Range & Units 02/18/23 09:18 08/30/23 07:50   TSH 0.380 - 5.330 uIU/mL 2.930    Free T-4 0.93 - 1.70 ng/dL 1.16 1.13       Patient's medications, allergies, and social histories were reviewed and updated as appropriate.  ROS:     CONS:     No fever, no chills, no weight loss, no fatigue   EYES:      No diplopia, no blurry vision, no redness of eyes, no swelling of eyelids   ENT:    No hearing loss, No ear pain, No sore throat, no dysphagia, no neck swelling   CV:     No chest pain, no palpitations, no claudication, no orthopnea, no PND   PULM:    No SOB, no cough, no hemoptysis, no wheezing    GI:   No nausea, no vomiting, no diarrhea, no constipation, no bloody stools   :  Passing urine well, no dysuria, no hematuria   ENDO:   No polyuria, no polydipsia, no heat intolerance, no cold intolerance   NEURO: No headaches, no dizziness, no convulsions, no tremors   MUSC:  No joint swellings, no arthralgias, no myalgias, no weakness   SKIN:   No rash, no ulcers, no dry skin   PSYCH:   No depression, no anxiety, no difficulty sleeping       Past Medical History:  Patient Active Problem List    Diagnosis Date Noted    Hiatal hernia 03/28/2023    Schatzki's ring 03/28/2023    Benign neoplasm of cecum 03/17/2023    H/O cold sores 03/08/2023    Primary insomnia 12/07/2022    Gastroesophageal reflux disease without esophagitis 12/07/2022     Right hip pain 12/07/2022    Left foot pain 12/07/2022    Greater trochanteric bursitis of right hip 12/07/2022    Age-related incipient cataract of both eyes 12/07/2022    Age-related osteoporosis without current pathological fracture 10/12/2022    Low vitamin D level 05/04/2022    Mixed stress and urge urinary incontinence 05/03/2022    Genetic screening 05/03/2022    History of smoking 05/03/2022    MDD (major depressive disorder), recurrent, in full remission (HCC) 06/15/2021    Diverticulitis 06/15/2021    Urinary incontinence 02/03/2021    Dyslipidemia 01/29/2021    Acquired hypothyroidism 01/29/2021       Past Surgical History:  Past Surgical History:   Procedure Laterality Date    ABDOMINAL HYSTERECTOMY TOTAL      BRCA1&2 GEN FULL SEQ DUP/DEL      HYSTERECTOMY, TOTAL ABDOMINAL Bilateral     PRIMARY C SECTION      TONSILLECTOMY AND ADENOIDECTOMY Bilateral     TUBAL COAGULATION LAPAROSCOPIC BILATERAL          Allergies:  Erythromycin and Other drug     Current Medications:    Current Outpatient Medications:     Pitavastatin Calcium 4 MG Tab, Take 4 mg by mouth every day., Disp: 100 Tablet, Rfl: 3    Multiple Vitamins-Minerals (CENTRUM ADULT PO), Take 1 Dose by mouth. Once daily., Disp: , Rfl:     escitalopram (LEXAPRO) 10 MG Tab, Take 1 Tablet by mouth every day., Disp: 90 Tablet, Rfl: 3    abaloparatide 3120 MCG/1.56ML Solution Pen-injector, Inject 80 mcg under the skin every evening., Disp: 1.56 mL, Rfl: 6    Insulin Pen Needle 32 G x 4 mm (BD PEN NEEDLE FREDY 2ND GEN), Use 1 pen needle as directed every day., Disp: 100 Each, Rfl: 11    valacyclovir (VALTREX) 1 GM Tab, Take 2 Tablets by mouth every 12 hours as needed (AT THE FIRST SIGN OF A COLD SORE FOR 2 DOSES THEN STOP.) for up to 40 doses., Disp: 20 Tablet, Rfl: 3    buPROPion (WELLBUTRIN XL) 150 MG XL tablet, Take 1 Tablet by mouth every morning., Disp: 90 Tablet, Rfl: 3    thyroid (ARMOUR THYROID) 15 MG Tab, Take 1 Tablet by mouth every 48 hours., Disp:  "45 Tablet, Rfl: 3    Specialty Vitamins Products (ONE-A-DAY BONE STRENGTH PO), Take 3 Tablets by mouth every day., Disp: , Rfl:     famotidine (PEPCID) 40 MG Tab, Take 40 mg by mouth every day., Disp: , Rfl:     Cholecalciferol (VITAMIN D PO), Take 50,000 Units by mouth every 7 days., Disp: , Rfl:     Social History:  Social History     Socioeconomic History    Marital status:      Spouse name: Not on file    Number of children: Not on file    Years of education: Not on file    Highest education level: Not on file   Occupational History    Not on file   Tobacco Use    Smoking status: Former     Current packs/day: 0.50     Average packs/day: 0.5 packs/day for 10.0 years (5.0 ttl pk-yrs)     Types: Cigarettes    Smokeless tobacco: Never   Vaping Use    Vaping Use: Never used   Substance and Sexual Activity    Alcohol use: Yes     Alcohol/week: 1.8 oz     Types: 3 Glasses of wine per week    Drug use: No    Sexual activity: Not Currently   Other Topics Concern    Not on file   Social History Narrative    Not on file     Social Determinants of Health     Financial Resource Strain: Not on file   Food Insecurity: Not on file   Transportation Needs: Not on file   Physical Activity: Not on file   Stress: Not on file   Social Connections: Not on file   Intimate Partner Violence: Not on file   Housing Stability: Not on file        Family History:   Family History   Problem Relation Age of Onset    Hypertension Father     Lung Disease Father     Other Father     Heart Disease Neg Hx          PHYSICAL EXAM:   Vital signs: /66 (BP Location: Left arm, Patient Position: Sitting, BP Cuff Size: Adult)   Pulse 86   Ht 1.651 m (5' 5\")   Wt 69.3 kg (152 lb 12.8 oz)   SpO2 97%   BMI 25.43 kg/m²   GENERAL: Well-developed, well-nourished  in no apparent distress.   EYE: No ocular and eyelid asymmetry, Anicteric sclerae,  PERRL, No exophthalmos or lidlag  SKIN: No rashes, lesions. Turgor is normal.    Labs:  Lab Results "   Component Value Date/Time    TSHULTRASEN 2.780 02/03/2021 1206     Lab Results   Component Value Date/Time    FREET4 0.94 08/24/2016 1236       Imaging:      ASSESSMENT/PLAN:   1. Postmenopausal osteoporosis  Stable  Patient agreeable to continue current regimen for osteopenia/osteoporosis with   Vitamin D supplementation discussed  Weightbearing exercises discussed 2lb weight lift   Calcium supplementation discussed 1200mg/day  Bone density scan will be done again on  9/2023    - PTH INTACT (PTH ONLY); Future  - FREE THYROXINE; Future  - PHOSPHORUS; Future    2. Overweight (BMI 25.0-29.9)  Unstable  Managed by diet and exercise    3. Vitamin D deficiency  - Stable  -Continue regimen-see HPI    - VITAMIN D,25 HYDROXY (DEFICIENCY); Future    4. Hypothyroidism, acquired  Stable  Follow-up with PCP  - TSH; Future       Disposition: Make an appointment to follow-up in 12 months   please do blood work 2 weeks prior to next appointment    Thank you kindly for allowing me to participate in the endocrine care plan for this patient.  I spent 52 min on this appointment, precharting, discussing diagnosis with patient    ELDER Hyatt  09/01/23        CC:   ELDER Chang

## 2023-09-14 ENCOUNTER — TELEPHONE (OUTPATIENT)
Dept: PHARMACY | Facility: MEDICAL CENTER | Age: 68
End: 2023-09-14
Payer: MEDICARE

## 2023-09-14 ENCOUNTER — PHARMACY VISIT (OUTPATIENT)
Dept: PHARMACY | Facility: MEDICAL CENTER | Age: 68
End: 2023-09-14
Payer: COMMERCIAL

## 2023-09-14 PROCEDURE — RXMED WILLOW AMBULATORY MEDICATION CHARGE

## 2023-09-14 NOTE — TELEPHONE ENCOUNTER
Contact:      Phone number: [phone], Mobile    Name of person spoken with and relationship to patient: Allyn Chet, Self   Patient’s Adherence:      How patient is doing on medication: Good    How many missed doses and reason: 0    Any new medications: No    Any new conditions: No    Any new allergies: No    Any new side effects: No    Any new diagnoses: No    How many doses remaininds-Tymlos, 7ds-BD Pen Needles    Did patient want to speak with pharmacist: No  Delivery:      Delivery date and method:  via USPS    Needs by Date: N/A    Signature required: No    Any additional details for : N/A   Teach Appointment Date: 2023  Shipping Address: [facility] [place] [address]  Medication (name, strength and dose): BD Pen Needle Pamela U/F 51ur0mm  Copay: $13.11  Payment Method: CCOF   Supplies: None  Additional Information: Allyn has declined a refill of Tymlos at this time as she has just opened the new pen about 5 to 6 days ago, with about 3 weeks remaining. Next call date: 12/15.

## 2023-09-29 PROBLEM — J00 ACUTE NASOPHARYNGITIS: Chronic | Status: ACTIVE | Noted: 2023-09-29

## 2023-09-29 PROBLEM — R42 DIZZINESS: Status: ACTIVE | Noted: 2023-09-29

## 2023-09-29 PROBLEM — J00 ACUTE NASOPHARYNGITIS: Status: ACTIVE | Noted: 2023-09-29

## 2023-10-03 ENCOUNTER — TELEPHONE (OUTPATIENT)
Dept: PHARMACY | Facility: MEDICAL CENTER | Age: 68
End: 2023-10-03
Payer: MEDICARE

## 2023-10-03 PROCEDURE — RXMED WILLOW AMBULATORY MEDICATION CHARGE

## 2023-10-04 ENCOUNTER — PHARMACY VISIT (OUTPATIENT)
Dept: PHARMACY | Facility: MEDICAL CENTER | Age: 68
End: 2023-10-04
Payer: COMMERCIAL

## 2023-10-04 NOTE — TELEPHONE ENCOUNTER
Contact:      Phone number: [phone], Mobile    Name of person spoken with and relationship to patient: Allyn Rosenberg, Self  Patient’s Adherence:      How patient is doing on medication: Good    How many missed doses and reason: 0    Any new medications: No    Any new conditions: No    Any new allergies: No    Any new side effects: No    Any new diagnoses: No    How many doses remaininds     Did patient want to speak with pharmacist: No  Delivery:      Delivery date and method: 10/04 via     Needs by Date: 10/07    Signature required: No    Any additional details for : Morning delivery, ring doorbell upon delivery  Teach Appointment Date:   Shipping Address: [facility] [place] [address]  Medication (name, strength and dose): Tymlos 3120mcg/1.56mL-80mcg daily  Copay: $605.40/30ds  Payment Method: CCOF  Supplies: None  Additional Information: Pt reports she fell and broke two bones in the foot. Had a cold and passed out on the way to bathroom. Next call date: 10/26.

## 2023-11-14 ENCOUNTER — TELEPHONE (OUTPATIENT)
Dept: PHARMACY | Facility: MEDICAL CENTER | Age: 68
End: 2023-11-14
Payer: MEDICARE

## 2023-11-14 PROCEDURE — RXMED WILLOW AMBULATORY MEDICATION CHARGE

## 2023-11-15 NOTE — TELEPHONE ENCOUNTER
Contact:      Phone number: [phone], Mobile    Name of person spoken with and relationship to patient: Allyn Rosenberg, Self  Patient’s Adherence:      How patient is doing on medication: Good    How many missed doses and reason: 0    Any new medications: No    Any new conditions: No    Any new allergies: No    Any new side effects: No    Any new diagnoses: No    How many doses remaininds    Did patient want to speak with pharmacist: No  Delivery:      Delivery date and method:  via     Needs by Date:     Signature required: No    Any additional details for : Ring doorbell upon delivery, no time preference   Teach Appointment Date: 2023  Shipping Address: [address] [place] [id number]  Medication (name, strength and dose): Tymlos 3120mcg/1.56mL  Copay: $498.05  Payment Method: CCOF  Supplies: None  Additional Information: Next call date: .

## 2023-11-16 ENCOUNTER — PHARMACY VISIT (OUTPATIENT)
Dept: PHARMACY | Facility: MEDICAL CENTER | Age: 68
End: 2023-11-16
Payer: COMMERCIAL

## 2023-12-15 ENCOUNTER — TELEPHONE (OUTPATIENT)
Dept: PHARMACY | Facility: MEDICAL CENTER | Age: 68
End: 2023-12-15
Payer: MEDICARE

## 2023-12-15 ENCOUNTER — PHARMACY VISIT (OUTPATIENT)
Dept: PHARMACY | Facility: MEDICAL CENTER | Age: 68
End: 2023-12-15
Payer: COMMERCIAL

## 2023-12-15 PROCEDURE — RXMED WILLOW AMBULATORY MEDICATION CHARGE

## 2023-12-16 NOTE — TELEPHONE ENCOUNTER
Contact:      Phone number: [phone], Mobile    Name of person spoken with and relationship to patient: Allyn Rosenberg, Self  Patient’s Adherence:      How patient is doing on medication: Good    How many missed doses and reason: 0    Any new medications: No    Any new conditions: No    Any new allergies: No    Any new side effects: No    Any new diagnoses: No    How many doses remaining: 10ds    Did patient want to speak with pharmacist: No  Delivery:      Delivery date and method: 12/15 via     Needs by Date: 12/25    Signature required: No    Any additional details for : None   Teach Appointment Date: 06/12/2023  Shipping Address: [facility] [place] [address]  Medication (name, strength and dose): Tymlos 3120mcg/1.56mL  Copay: $120.41/30ds  Payment Method: CCOF  Supplies: None  Additional Information: Pt declined a refill of BD Pen Needles at this time as she still has about a month on hand. Next call date: 01/17.

## 2024-01-22 ENCOUNTER — TELEPHONE (OUTPATIENT)
Dept: ENDOCRINOLOGY | Facility: MEDICAL CENTER | Age: 69
End: 2024-01-22
Payer: MEDICARE

## 2024-01-22 NOTE — TELEPHONE ENCOUNTER
Called patient at [phone] on the request of the provider (she wants to see patient to discuss Tymlos therapy and possible alternatives). Patient stated that she has availability on Mondays, Wednesdays, and Fridays due to her taking care of her grandchildren. Scheduling stated that provider had availability on Friday 1/26/24 at 9:50am and patient agreed. I advised patient to bring in any notifications/EOB in regards to the Tymlos.     Thank you,  Birgit Guajardo, [facility]  Endocrinology Pharmacy Coordinator

## 2024-01-26 ENCOUNTER — OFFICE VISIT (OUTPATIENT)
Dept: ENDOCRINOLOGY | Facility: MEDICAL CENTER | Age: 69
End: 2024-01-26
Payer: MEDICARE

## 2024-01-26 VITALS
HEART RATE: 86 BPM | OXYGEN SATURATION: 98 % | DIASTOLIC BLOOD PRESSURE: 66 MMHG | SYSTOLIC BLOOD PRESSURE: 112 MMHG | WEIGHT: 155.1 LBS | BODY MASS INDEX: 25.84 KG/M2 | HEIGHT: 65 IN

## 2024-01-26 DIAGNOSIS — M81.0 POSTMENOPAUSAL OSTEOPOROSIS: ICD-10-CM

## 2024-01-26 DIAGNOSIS — E03.9 HYPOTHYROIDISM, ACQUIRED: ICD-10-CM

## 2024-01-26 DIAGNOSIS — E66.3 OVERWEIGHT (BMI 25.0-29.9): ICD-10-CM

## 2024-01-26 DIAGNOSIS — E55.9 VITAMIN D DEFICIENCY: ICD-10-CM

## 2024-01-26 PROCEDURE — 99212 OFFICE O/P EST SF 10 MIN: CPT

## 2024-01-26 PROCEDURE — 3074F SYST BP LT 130 MM HG: CPT

## 2024-01-26 PROCEDURE — 3078F DIAST BP <80 MM HG: CPT

## 2024-01-26 PROCEDURE — 99214 OFFICE O/P EST MOD 30 MIN: CPT

## 2024-01-26 ASSESSMENT — FIBROSIS 4 INDEX: FIB4 SCORE: 0.82

## 2024-01-26 NOTE — PROGRESS NOTES
Chief Complaint: Follow up for the following conditions   Subjective:   Allyn Rosenberg is a 68 y.o. female     Postmenopausal Osteoporosis:   Pt was losing bone on her teeth and evaluation for Osteoporosis was done   Patient admits to history of fracture a few years ago due to twisting ankle while she was hinking L .      The cause of osteoporosis is felt to be due to postmenopausal estrogen deficiency and dietary calcium and/or vitamin D deficiency.   She is currently being treated with calcium-1200 mg  and vitamin D3-76989 weekly supplementation-bone strength  Good amounts of green leafy vegetables  Pt obtain a vibrating machine to help with esau bearing, she also walks the dog     Tymlos was prescribed on last appointment -taking for the past 3 months -began around June of 2023 at [facility]   Pt is now paying $800/months for her Tymlos     Tried and failed Fosamax - SE of severe joint pain   Done on 9/28/2022    Pt got up in the middle of the night and she reports that due to Covid she pass out due to dizziness, she fracture L fibula -this if follow by the [facility]     -Patient did not do blood work before this appointment today          Recurrent falls: no  Physical activity: walking with granddaughter      Latest Reference Range & Units 08/30/23 07:50   GFR (CKD-EPI) >60 mL/min/1.73 m 2 76      Latest Reference Range & Units 08/30/23 07:50   Pth, Intact 14.0 - 72.0 pg/mL 52.7      Latest Reference Range & Units 02/18/23 09:18   C. Telopeptide B Cross Linked pg/mL 487      Latest Reference Range & Units 02/18/23 09:18   Pth, Intact 14.0 - 72.0 pg/mL 71.7     SPEP W/REFLEX TO JAMES BRAVO G, M  Order: [id number]  Status: Final result     Visible to patient: Yes (seen)     Next appt: Today at 10:10 AM in Endocrinology (Jai Rudolph, A.P.R.N.)     Dx: Post-menopausal osteoporosis     0 Result Notes       Component Ref Range & Units 3 mo ago   Albumin 3.75 - 5.01 g/dL 4.12    Alpha-1 Globulin 0.19 - 0.46 g/dL 0.37     Alpha-2 Globulin 0.48 - 1.05 g/dL 0.91    Beta Globulin 0.48 - 1.10 g/dL 1.01    Gamma Globulin 0.62 - 1.51 g/dL 0.90    Monoclonal Protein g/dL Not Applicable    Interpretation  See Note    Comment: Normal SPEP pattern.        Miscellaneous Lab Result  REFERENCE [email or web address]  Collected: 02/18/23 0919   Result status: Final   Resulting lab: [facility]   Value: SEE NOTE   Comment: Test name                     Result Flag Units  RefIntvl   -------------------------------------------------------------   Osteocalcin by ECIA               14       ng/mL 8-36     2. Overweight:  Up to 25lbs for the past 2 years  Despite diet and exercise     3. Vitamin D defienciency:   Currently taking 87076 weekly   Patient did not do blood work before this appointment   Latest Reference Range & Units 08/30/23 07:50   25-Hydroxy   Vitamin D 25 30 - 100 ng/mL 48     4. Hypothyroidism, Acquired:  FV by pcp    Latest Reference Range & Units 02/18/23 09:18 08/30/23 07:50   TSH 0.380 - 5.330 uIU/mL 2.930    Free T-4 0.93 - 1.70 ng/dL 1.16 1.13       Patient's medications, allergies, and social histories were reviewed and updated as appropriate.  ROS:     CONS:     No fever, no chills, no weight loss, no fatigue   EYES:      No diplopia, no blurry vision, no redness of eyes, no swelling of eyelids   ENT:    No hearing loss, No ear pain, No sore throat, no dysphagia, no neck swelling   CV:     No chest pain, no palpitations, no claudication, no orthopnea, no PND   PULM:    No SOB, no cough, no hemoptysis, no wheezing    GI:   No nausea, no vomiting, no diarrhea, no constipation, no bloody stools   :  Passing urine well, no dysuria, no hematuria   ENDO:   No polyuria, no polydipsia, no heat intolerance, no cold intolerance   NEURO: No headaches, no dizziness, no convulsions, no tremors   MUSC:  No joint swellings, no arthralgias, no myalgias, no weakness   SKIN:   No rash, no ulcers, no dry skin   PSYCH:   No depression, no anxiety, no difficulty  sleeping       Past Medical History:  Patient Active Problem List    Diagnosis Date Noted    Dizziness 09/29/2023    Acute nasopharyngitis 09/29/2023    Hiatal hernia 03/28/2023    Schatzki's ring 03/28/2023    Benign neoplasm of cecum 03/17/2023    H/O cold sores 03/08/2023    Primary insomnia 12/07/2022    Gastroesophageal reflux disease without esophagitis 12/07/2022    Right hip pain 12/07/2022    Left foot pain 12/07/2022    Greater trochanteric bursitis of right hip 12/07/2022    Age-related incipient cataract of both eyes 12/07/2022    Age-related osteoporosis without current pathological fracture 10/12/2022    Low vitamin D level 05/04/2022    Mixed stress and urge urinary incontinence 05/03/2022    Genetic screening 05/03/2022    History of smoking 05/03/2022    MDD (major depressive disorder), recurrent, in full remission (HCC) 06/15/2021    Diverticulitis 06/15/2021    Urinary incontinence 02/03/2021    Dyslipidemia 01/29/2021    Acquired hypothyroidism 01/29/2021       Past Surgical History:  Past Surgical History:   Procedure Laterality Date    ABDOMINAL HYSTERECTOMY TOTAL      BRCA1&2 GEN FULL SEQ DUP/DEL      HYSTERECTOMY, TOTAL ABDOMINAL Bilateral     PRIMARY C SECTION      TONSILLECTOMY AND ADENOIDECTOMY Bilateral     TUBAL COAGULATION LAPAROSCOPIC BILATERAL          Allergies:  Erythromycin and Other drug     Current Medications:    Current Outpatient Medications:     buPROPion (WELLBUTRIN XL) 150 MG XL tablet, TAKE 1 TABLET BY MOUTH EVERY DAY IN THE MORNING, Disp: 90 Tablet, Rfl: 3    Pitavastatin Calcium 4 MG Tab, Take 4 mg by mouth every day. (Patient not taking: Reported on 9/27/2023), Disp: 100 Tablet, Rfl: 3    Multiple Vitamins-Minerals (CENTRUM ADULT PO), Take 1 Dose by mouth. Once daily., Disp: , Rfl:     escitalopram (LEXAPRO) 10 MG Tab, Take 1 Tablet by mouth every day., Disp: 90 Tablet, Rfl: 3    abaloparatide 3120 MCG/1.56ML Solution Pen-injector, Inject 80 mcg under the skin every  evening., Disp: 1.56 mL, Rfl: 6    Insulin Pen Needle 32 G x 4 mm (BD PEN NEEDLE FREDY 2ND GEN), Use 1 pen needle as directed every day., Disp: 100 Each, Rfl: 11    valacyclovir (VALTREX) 1 GM Tab, Take 2 Tablets by mouth every 12 hours as needed (AT THE FIRST SIGN OF A COLD SORE FOR 2 DOSES THEN STOP.) for up to 40 doses., Disp: 20 Tablet, Rfl: 3    thyroid (ARMOUR THYROID) 15 MG Tab, Take 1 Tablet by mouth every 48 hours. (Patient not taking: Reported on 9/27/2023), Disp: 45 Tablet, Rfl: 3    Specialty Vitamins Products (ONE-A-DAY BONE STRENGTH PO), Take 3 Tablets by mouth every day., Disp: , Rfl:     famotidine (PEPCID) 40 MG Tab, Take 40 mg by mouth every day., Disp: , Rfl:     Cholecalciferol (VITAMIN D PO), Take 50,000 Units by mouth every 7 days., Disp: , Rfl:     Social History:  Social History     Socioeconomic History    Marital status:      Spouse name: Not on file    Number of children: Not on file    Years of education: Not on file    Highest education level: Not on file   Occupational History    Not on file   Tobacco Use    Smoking status: Former     Current packs/day: 0.50     Average packs/day: 0.5 packs/day for 10.0 years (5.0 ttl pk-yrs)     Types: Cigarettes    Smokeless tobacco: Never   Vaping Use    Vaping Use: Never used   Substance and Sexual Activity    Alcohol use: Yes     Alcohol/week: 1.8 oz     Types: 3 Glasses of wine per week    Drug use: No    Sexual activity: Not Currently   Other Topics Concern    Not on file   Social History Narrative    Not on file     Social Determinants of Health     Financial Resource Strain: Not on file   Food Insecurity: Not on file   Transportation Needs: Not on file   Physical Activity: Not on file   Stress: Not on file   Social Connections: Not on file   Intimate Partner Violence: Not on file   Housing Stability: Not on file        Family History:   Family History   Problem Relation Age of Onset    Hypertension Father     Lung Disease Father     Other  "Father     Heart Disease Neg Hx          PHYSICAL EXAM:   Vital signs: /66 (BP Location: Left arm, Patient Position: Sitting, BP Cuff Size: Adult)   Pulse 86   Ht 1.651 m (5' 5\")   Wt 70.4 kg (155 lb 1.6 oz)   SpO2 98%   BMI 25.81 kg/m²   GENERAL: Well-developed, well-nourished  in no apparent distress.   EYE: No ocular and eyelid asymmetry, Anicteric sclerae,  PERRL, No exophthalmos or lidlag  SKIN: No rashes, lesions. Turgor is normal.    Labs:  Lab Results   Component Value Date/Time    TSHULTRASEN 2.780 02/03/2021 1206     Lab Results   Component Value Date/Time    FREET4 0.94 08/24/2016 1236       Imaging:      ASSESSMENT/PLAN:   1. Postmenopausal osteoporosis  Stable  Patient agreeable to continue current regimen for osteopenia/osteoporosis with Tymlos   Vitamin D supplementation discussed  Weightbearing exercises discussed 2lb weight lift   Calcium supplementation discussed 1200mg/day  Bone density scan will be done again on  9/2023  --Pt's insurance said that Prolia was recommended but at this time based on patient's last Dexa scan she required a anabolic agent in order to prevent fractures, healing from fractures and possibly disability   --Pt is paying $800/months- We will do a prior authorization for her Tymlos   --Message sent to pharmacy team   --Pt will message in two weeks     - PTH INTACT (PTH ONLY); Future  - FREE THYROXINE; Future  - PHOSPHORUS; Future    2. Overweight (BMI 25.0-29.9)  Unstable  Managed by diet and exercise    3. Vitamin D deficiency  - Stable  -Continue regimen-see HPI    - VITAMIN D,25 HYDROXY (DEFICIENCY); Future    4. Hypothyroidism, acquired  Stable  Follow-up with PCP  - TSH; Future       Disposition: Make an appointment to follow-up in september   please do blood work 2 weeks prior to next appointment    Thank you kindly for allowing me to participate in the endocrine care plan for this patient.      Jai Rudolph, A.P.R.N.  01/26/24          CC:   Kana Santiago, " ELDER

## 2024-01-29 ENCOUNTER — TELEPHONE (OUTPATIENT)
Dept: ENDOCRINOLOGY | Facility: MEDICAL CENTER | Age: 69
End: 2024-01-29
Payer: MEDICARE

## 2024-01-29 NOTE — TELEPHONE ENCOUNTER
Called patient at [phone] to discuss possible FA for Tylos or Forteo (under PAP, both have the same income limit for a 2 person House Hold at 300% FPL= $59,160). Patient is interested in continuing the therapy treatment with Tymlos.   Medication: Tymlos 3120mcg/1.56mL Sol Pen-Inj (Quantity 1.56mL, Day Supply 30)  Copay: $866.26  Household size:2  Annual Household Adjusted Gross Income: Patient will call back to verify  Additional information/consent to FA: Patient gave verbal consent to screen her and obtain FA through [employer]. Patient has Government sponsored insurance. Patient stated that she will call me back to confirm her income.    Thank you,  Birgit Guajardo, [facility]  Endocrinology Pharmacy Coordinator

## 2024-02-13 ENCOUNTER — TELEPHONE (OUTPATIENT)
Dept: ENDOCRINOLOGY | Facility: MEDICAL CENTER | Age: 69
End: 2024-02-13
Payer: MEDICARE

## 2024-02-13 PROCEDURE — RXMED WILLOW AMBULATORY MEDICATION CHARGE

## 2024-02-13 NOTE — TELEPHONE ENCOUNTER
Received incoming call from patient.   Patient’s Adherence:  How patient is doing on medication: Ok or neutral    How many missed doses and reason: 0     Any new medications: No    Any new conditions: No    Any new allergies: No    Any new side effects: No    Any new diagnoses: No    How many doses remainin    Did patient want to speak with pharmacist: No   Delivery:  Delivery date and method: 2024 via      Needs by Date: 2024    Signature required: No     Any additional details for :  Patient wants the doorbell rang upon delivery.   Teach Appointment Date:  N/A   Shipping Address:  [address] [place] [address]   Medication(name,strength and dose):  Tymlos 3120mcg/1.56mL Sol Pen-Inj (1.56mL/30ds) & BD Pen Needle 17Ih4wv (100/100ds)   Copay:  $890.26   Payment Method:  Credit card on file   Supplies:  [employer]    Additional Information:  N/A     Thank you,  Birgit Guajardo [facility]  Endocrinology Pharmacy Coordinator

## 2024-02-13 NOTE — TELEPHONE ENCOUNTER
Received an incoming call from patient. She stated that she has come back into town and went through her current proof of income and wishes not to pursue FA at this time (patient stated that she will not qualify). She did want to refill the prescription and set-up delivery for tomorrow 2/14/2024.    Thank you,  Birgit Guajardo, [facility]  Endocrinology Pharmacy Coordinator

## 2024-02-14 ENCOUNTER — PHARMACY VISIT (OUTPATIENT)
Dept: PHARMACY | Facility: MEDICAL CENTER | Age: 69
End: 2024-02-14
Payer: COMMERCIAL

## 2024-03-06 DIAGNOSIS — M81.0 POSTMENOPAUSAL OSTEOPOROSIS: ICD-10-CM

## 2024-03-07 ENCOUNTER — TELEPHONE (OUTPATIENT)
Dept: ENDOCRINOLOGY | Facility: MEDICAL CENTER | Age: 69
End: 2024-03-07
Payer: MEDICARE

## 2024-03-07 RX ORDER — ABALOPARATIDE 2000 UG/ML
80 INJECTION, SOLUTION SUBCUTANEOUS NIGHTLY
Qty: 1.56 ML | Refills: 6 | Status: SHIPPED | OUTPATIENT
Start: 2024-03-07

## 2024-03-08 NOTE — TELEPHONE ENCOUNTER
Prior Authorization for Tymlos 3120mcg/1.56mL Pen-Inj  (Quantity: 1.56mL, Days: 30) has been submitted via Cover My Meds: Key ([id number])  Insurance: Senior Care Plus/OptumRx D   Will follow up in 24-48 business hours.     Thank you,  Birgit Guajardo, [facility]  Endocrinology Pharmacy Coordinator

## 2024-03-08 NOTE — TELEPHONE ENCOUNTER
Prior Authorization for   Tymlos 3120mcg/1.56mL Pen-Inj has been approved for a quantity of 1.56mL , day supply 30  Prior Authorization reference number: [id number]  Insurance: Senior Care Plus/OptumRx D  Effective dates: 3/8/2024-3/7/2026  Copay: $835.88 (Specialty Tier)-Insuance denied the Co-pay exception/reduction request.   Is patient eligible to fill with [facility]? Yes-already fills  Next Steps: The patient's copay exceeds $5.00. Patient refused to pursue FA for this medication on 2/13/2024.     Thank you,  Birgit Guajardo [facility]  Endocrinology Pharmacy Coordinator

## 2024-03-08 NOTE — TELEPHONE ENCOUNTER
Received Refill PA request via [employer]  for Tymlos 3120mcg/1.56mL Pen-Inj . (Quantity:1.56mL, Day Supply:30)  Insurance: Senior Care Plus/OptumRx D  Member ID:  [id number]  BIN: [id number]  PCN: [id number]  [facility]   Ran Test claim via [place] & medication Rejects stating prior authorization is required.    Thank you,  Birgit Guajardo [facility]  Endocrinology Pharmacy Coordinator

## 2024-03-08 NOTE — TELEPHONE ENCOUNTER
Called patient at [phone] to advise her of the PA approval and CE denial of the Tymlos. There was no answer and I left a general voice message for the patient with my call back number.     Thank you,  Birgit Guajardo, [facility]  Endocrinology Pharmacy Coordinator

## 2024-03-15 ENCOUNTER — TELEPHONE (OUTPATIENT)
Dept: PHARMACY | Facility: MEDICAL CENTER | Age: 69
End: 2024-03-15
Payer: MEDICARE

## 2024-03-15 NOTE — TELEPHONE ENCOUNTER
Spoke to Allyn regarding a refill of Tymlos. She has declined a refill at this time stating she has an extra pen on hand that she had accidentally left in [place]. We have scheduled a follow up call in about a month but she was recommended to contact us if she needs a refill sooner.

## 2024-04-23 DIAGNOSIS — M81.0 POSTMENOPAUSAL OSTEOPOROSIS: ICD-10-CM

## 2024-04-24 ENCOUNTER — TELEPHONE (OUTPATIENT)
Dept: PHARMACY | Facility: MEDICAL CENTER | Age: 69
End: 2024-04-24
Payer: MEDICARE

## 2024-04-25 NOTE — TELEPHONE ENCOUNTER
Patient can not afford Tymlos she asked me to talk to Dr. Rudolph and ask her to please change to Prolia, I talk to Dr. Rudolph and she discontinued the Tymlos and send an order for Prolia, called patient and LVM.    Anastasia Connell, Pharmacy Liaison/ RX Coordinator

## 2024-04-29 PROBLEM — M81.0 POSTMENOPAUSAL OSTEOPOROSIS: Chronic | Status: ACTIVE | Noted: 2024-04-23

## 2024-04-29 PROBLEM — K57.92 DIVERTICULITIS: Chronic | Status: RESOLVED | Noted: 2021-06-15 | Resolved: 2024-04-29

## 2024-04-29 PROBLEM — J30.2 SEASONAL ALLERGIES: Status: ACTIVE | Noted: 2024-04-29

## 2024-04-29 PROBLEM — R32 URINARY INCONTINENCE: Chronic | Status: RESOLVED | Noted: 2021-02-03 | Resolved: 2024-04-29

## 2024-04-29 PROBLEM — R42 DIZZINESS: Status: RESOLVED | Noted: 2023-09-29 | Resolved: 2024-04-29

## 2024-04-29 PROBLEM — J00 ACUTE NASOPHARYNGITIS: Chronic | Status: RESOLVED | Noted: 2023-09-29 | Resolved: 2024-04-29

## 2024-04-29 PROBLEM — J30.2 SEASONAL ALLERGIES: Chronic | Status: ACTIVE | Noted: 2024-04-29

## 2024-04-29 PROBLEM — H25.093 AGE-RELATED INCIPIENT CATARACT OF BOTH EYES: Status: RESOLVED | Noted: 2022-12-07 | Resolved: 2024-04-29

## 2024-04-30 ENCOUNTER — HOSPITAL ENCOUNTER (OUTPATIENT)
Dept: LAB | Facility: MEDICAL CENTER | Age: 69
End: 2024-04-30
Payer: MEDICARE

## 2024-04-30 DIAGNOSIS — R79.89 LOW VITAMIN D LEVEL: Chronic | ICD-10-CM

## 2024-04-30 DIAGNOSIS — M81.0 AGE-RELATED OSTEOPOROSIS WITHOUT CURRENT PATHOLOGICAL FRACTURE: Chronic | ICD-10-CM

## 2024-04-30 DIAGNOSIS — E78.5 DYSLIPIDEMIA: Chronic | ICD-10-CM

## 2024-04-30 LAB
25(OH)D3 SERPL-MCNC: 32 NG/ML (ref 30–100)
ALBUMIN SERPL BCP-MCNC: 4.3 G/DL (ref 3.2–4.9)
ALBUMIN/GLOB SERPL: 1.6 G/DL
ALP SERPL-CCNC: 86 U/L (ref 30–99)
ALT SERPL-CCNC: 17 U/L (ref 2–50)
ANION GAP SERPL CALC-SCNC: 12 MMOL/L (ref 7–16)
AST SERPL-CCNC: 16 U/L (ref 12–45)
BASOPHILS # BLD AUTO: 0.3 % (ref 0–1.8)
BASOPHILS # BLD: 0.02 K/UL (ref 0–0.12)
BILIRUB SERPL-MCNC: 0.4 MG/DL (ref 0.1–1.5)
BUN SERPL-MCNC: 20 MG/DL (ref 8–22)
CALCIUM ALBUM COR SERPL-MCNC: 8.9 MG/DL (ref 8.5–10.5)
CALCIUM SERPL-MCNC: 9.1 MG/DL (ref 8.5–10.5)
CHLORIDE SERPL-SCNC: 105 MMOL/L (ref 96–112)
CHOLEST SERPL-MCNC: 301 MG/DL (ref 100–199)
CO2 SERPL-SCNC: 23 MMOL/L (ref 20–33)
CREAT SERPL-MCNC: 0.82 MG/DL (ref 0.5–1.4)
EOSINOPHIL # BLD AUTO: 0.43 K/UL (ref 0–0.51)
EOSINOPHIL NFR BLD: 6.6 % (ref 0–6.9)
ERYTHROCYTE [DISTWIDTH] IN BLOOD BY AUTOMATED COUNT: 45.1 FL (ref 35.9–50)
FASTING STATUS PATIENT QL REPORTED: NORMAL
GFR SERPLBLD CREATININE-BSD FMLA CKD-EPI: 77 ML/MIN/1.73 M 2
GLOBULIN SER CALC-MCNC: 2.7 G/DL (ref 1.9–3.5)
GLUCOSE SERPL-MCNC: 97 MG/DL (ref 65–99)
HCT VFR BLD AUTO: 39.9 % (ref 37–47)
HDLC SERPL-MCNC: 72 MG/DL
HGB BLD-MCNC: 13.4 G/DL (ref 12–16)
IMM GRANULOCYTES # BLD AUTO: 0.02 K/UL (ref 0–0.11)
IMM GRANULOCYTES NFR BLD AUTO: 0.3 % (ref 0–0.9)
LDLC SERPL CALC-MCNC: 193 MG/DL
LYMPHOCYTES # BLD AUTO: 2.36 K/UL (ref 1–4.8)
LYMPHOCYTES NFR BLD: 36.1 % (ref 22–41)
MCH RBC QN AUTO: 31.5 PG (ref 27–33)
MCHC RBC AUTO-ENTMCNC: 33.6 G/DL (ref 32.2–35.5)
MCV RBC AUTO: 93.9 FL (ref 81.4–97.8)
MONOCYTES # BLD AUTO: 0.66 K/UL (ref 0–0.85)
MONOCYTES NFR BLD AUTO: 10.1 % (ref 0–13.4)
NEUTROPHILS # BLD AUTO: 3.05 K/UL (ref 1.82–7.42)
NEUTROPHILS NFR BLD: 46.6 % (ref 44–72)
NRBC # BLD AUTO: 0 K/UL
NRBC BLD-RTO: 0 /100 WBC (ref 0–0.2)
PLATELET # BLD AUTO: 304 K/UL (ref 164–446)
PMV BLD AUTO: 10 FL (ref 9–12.9)
POTASSIUM SERPL-SCNC: 3.9 MMOL/L (ref 3.6–5.5)
PROT SERPL-MCNC: 7 G/DL (ref 6–8.2)
RBC # BLD AUTO: 4.25 M/UL (ref 4.2–5.4)
SODIUM SERPL-SCNC: 140 MMOL/L (ref 135–145)
TRIGL SERPL-MCNC: 181 MG/DL (ref 0–149)
WBC # BLD AUTO: 6.5 K/UL (ref 4.8–10.8)

## 2024-05-02 LAB — COLLAGEN CTX SERPL-MCNC: 375 PG/ML

## 2024-05-16 LAB — TEST NAME 95000: NORMAL

## 2024-06-03 ENCOUNTER — TELEPHONE (OUTPATIENT)
Dept: ENDOCRINOLOGY | Facility: MEDICAL CENTER | Age: 69
End: 2024-06-03
Payer: MEDICARE

## 2024-06-03 RX ORDER — DIPHENHYDRAMINE HYDROCHLORIDE 50 MG/ML
50 INJECTION INTRAMUSCULAR; INTRAVENOUS PRN
Status: CANCELLED | OUTPATIENT
Start: 2024-06-03

## 2024-06-03 RX ORDER — METHYLPREDNISOLONE SODIUM SUCCINATE 125 MG/2ML
125 INJECTION, POWDER, LYOPHILIZED, FOR SOLUTION INTRAMUSCULAR; INTRAVENOUS PRN
Status: CANCELLED | OUTPATIENT
Start: 2024-06-03

## 2024-06-03 RX ORDER — EPINEPHRINE 1 MG/ML(1)
0.5 AMPUL (ML) INJECTION PRN
Status: CANCELLED | OUTPATIENT
Start: 2024-06-03

## 2024-06-03 NOTE — TELEPHONE ENCOUNTER
Received incoming voice message from patient on 6/1/24 @ 11:55am. She is inquiring about the start of the Prolia Jai ordered for her. Her call back number is [phone].     Thank you,  Birgit Guajardo, [facility]  Endocrinology Pharmacy Coordinator

## 2024-06-24 ENCOUNTER — OUTPATIENT INFUSION SERVICES (OUTPATIENT)
Dept: ONCOLOGY | Facility: MEDICAL CENTER | Age: 69
End: 2024-06-24
Attending: INTERNAL MEDICINE
Payer: MEDICARE

## 2024-06-24 VITALS
TEMPERATURE: 98 F | RESPIRATION RATE: 18 BRPM | BODY MASS INDEX: 26.37 KG/M2 | OXYGEN SATURATION: 96 % | WEIGHT: 158.29 LBS | SYSTOLIC BLOOD PRESSURE: 131 MMHG | HEIGHT: 65 IN | DIASTOLIC BLOOD PRESSURE: 71 MMHG | HEART RATE: 75 BPM

## 2024-06-24 DIAGNOSIS — M81.0 POSTMENOPAUSAL OSTEOPOROSIS: ICD-10-CM

## 2024-06-24 LAB
CA-I BLD ISE-SCNC: 1.26 MMOL/L (ref 1.1–1.3)
CREAT BLD-MCNC: 0.8 MG/DL (ref 0.5–1.4)

## 2024-06-24 PROCEDURE — 82330 ASSAY OF CALCIUM: CPT

## 2024-06-24 PROCEDURE — 36415 COLL VENOUS BLD VENIPUNCTURE: CPT

## 2024-06-24 PROCEDURE — 96372 THER/PROPH/DIAG INJ SC/IM: CPT

## 2024-06-24 PROCEDURE — 700111 HCHG RX REV CODE 636 W/ 250 OVERRIDE (IP): Mod: JZ | Performed by: INTERNAL MEDICINE

## 2024-06-24 PROCEDURE — 82565 ASSAY OF CREATININE: CPT

## 2024-06-24 RX ORDER — METHYLPREDNISOLONE SODIUM SUCCINATE 125 MG/2ML
125 INJECTION, POWDER, LYOPHILIZED, FOR SOLUTION INTRAMUSCULAR; INTRAVENOUS PRN
OUTPATIENT
Start: 2024-12-21

## 2024-06-24 RX ORDER — EPINEPHRINE 1 MG/ML(1)
0.5 AMPUL (ML) INJECTION PRN
OUTPATIENT
Start: 2024-12-21

## 2024-06-24 RX ORDER — DIPHENHYDRAMINE HYDROCHLORIDE 50 MG/ML
50 INJECTION INTRAMUSCULAR; INTRAVENOUS PRN
OUTPATIENT
Start: 2024-12-21

## 2024-06-24 RX ADMIN — DENOSUMAB 60 MG: 60 INJECTION SUBCUTANEOUS at 15:11

## 2024-06-24 ASSESSMENT — FIBROSIS 4 INDEX: FIB4 SCORE: 0.89

## 2024-06-24 NOTE — PROGRESS NOTES
Allyn arrived ambulatory for Q 6 month Prolia injection. She denies any s/s acute infection or illness, denies dental procedures in the past 4 weeks or upcoming dental procedures, denies s/s of hypocalcimia.  Pt confirms taking calcuim/vitamin D at home.    Istat Ca/Cr lab drawn from right AC, sterile gauze and coban to site.  Lab parameters met, Prolia injected Sub Q to back right arm per MAR, bandaid applied to site.  Allyn tolerated well, discharged in no apparent distress, next appointment scheduled and confirmed.

## 2024-07-06 ENCOUNTER — HOSPITAL ENCOUNTER (INPATIENT)
Facility: MEDICAL CENTER | Age: 69
LOS: 1 days | DRG: 153 | End: 2024-07-07
Attending: EMERGENCY MEDICINE | Admitting: HOSPITALIST
Payer: MEDICARE

## 2024-07-06 ENCOUNTER — APPOINTMENT (OUTPATIENT)
Dept: RADIOLOGY | Facility: MEDICAL CENTER | Age: 69
DRG: 153 | End: 2024-07-06
Attending: EMERGENCY MEDICINE
Payer: MEDICARE

## 2024-07-06 ENCOUNTER — OFFICE VISIT (OUTPATIENT)
Dept: URGENT CARE | Facility: CLINIC | Age: 69
End: 2024-07-06
Payer: MEDICARE

## 2024-07-06 VITALS
RESPIRATION RATE: 16 BRPM | HEART RATE: 81 BPM | BODY MASS INDEX: 25.49 KG/M2 | SYSTOLIC BLOOD PRESSURE: 118 MMHG | TEMPERATURE: 97.2 F | OXYGEN SATURATION: 97 % | WEIGHT: 153 LBS | HEIGHT: 65 IN | DIASTOLIC BLOOD PRESSURE: 76 MMHG

## 2024-07-06 DIAGNOSIS — J39.2 MASS OF HYPOPHARYNX: ICD-10-CM

## 2024-07-06 DIAGNOSIS — M54.2 NECK PAIN ON RIGHT SIDE: ICD-10-CM

## 2024-07-06 DIAGNOSIS — Z87.891 HISTORY OF SMOKING: Chronic | ICD-10-CM

## 2024-07-06 DIAGNOSIS — J39.1 PHARYNGEAL ABSCESS: ICD-10-CM

## 2024-07-06 DIAGNOSIS — R13.10 DYSPHAGIA, UNSPECIFIED TYPE: ICD-10-CM

## 2024-07-06 DIAGNOSIS — J02.9 PHARYNGITIS, UNSPECIFIED ETIOLOGY: ICD-10-CM

## 2024-07-06 DIAGNOSIS — J06.9 VIRAL URI: ICD-10-CM

## 2024-07-06 PROBLEM — R74.8 ALKALINE PHOSPHATASE ELEVATION: Status: ACTIVE | Noted: 2024-07-06

## 2024-07-06 LAB
ALBUMIN SERPL BCP-MCNC: 4.6 G/DL (ref 3.2–4.9)
ALBUMIN/GLOB SERPL: 1.5 G/DL
ALP SERPL-CCNC: 124 U/L (ref 30–99)
ALT SERPL-CCNC: 26 U/L (ref 2–50)
ANION GAP SERPL CALC-SCNC: 13 MMOL/L (ref 7–16)
AST SERPL-CCNC: 18 U/L (ref 12–45)
BASOPHILS # BLD AUTO: 0.3 % (ref 0–1.8)
BASOPHILS # BLD: 0.04 K/UL (ref 0–0.12)
BILIRUB SERPL-MCNC: 0.7 MG/DL (ref 0.1–1.5)
BUN SERPL-MCNC: 8 MG/DL (ref 8–22)
CALCIUM ALBUM COR SERPL-MCNC: 9 MG/DL (ref 8.5–10.5)
CALCIUM SERPL-MCNC: 9.5 MG/DL (ref 8.5–10.5)
CHLORIDE SERPL-SCNC: 101 MMOL/L (ref 96–112)
CO2 SERPL-SCNC: 23 MMOL/L (ref 20–33)
CREAT SERPL-MCNC: 0.59 MG/DL (ref 0.5–1.4)
EOSINOPHIL # BLD AUTO: 0.08 K/UL (ref 0–0.51)
EOSINOPHIL NFR BLD: 0.7 % (ref 0–6.9)
ERYTHROCYTE [DISTWIDTH] IN BLOOD BY AUTOMATED COUNT: 43 FL (ref 35.9–50)
FLUAV RNA SPEC QL NAA+PROBE: NEGATIVE
FLUBV RNA SPEC QL NAA+PROBE: NEGATIVE
GFR SERPLBLD CREATININE-BSD FMLA CKD-EPI: 97 ML/MIN/1.73 M 2
GLOBULIN SER CALC-MCNC: 3 G/DL (ref 1.9–3.5)
GLUCOSE SERPL-MCNC: 100 MG/DL (ref 65–99)
HCT VFR BLD AUTO: 42.4 % (ref 37–47)
HGB BLD-MCNC: 14.4 G/DL (ref 12–16)
IMM GRANULOCYTES # BLD AUTO: 0.05 K/UL (ref 0–0.11)
IMM GRANULOCYTES NFR BLD AUTO: 0.4 % (ref 0–0.9)
LYMPHOCYTES # BLD AUTO: 2.26 K/UL (ref 1–4.8)
LYMPHOCYTES NFR BLD: 19.7 % (ref 22–41)
MCH RBC QN AUTO: 31.4 PG (ref 27–33)
MCHC RBC AUTO-ENTMCNC: 34 G/DL (ref 32.2–35.5)
MCV RBC AUTO: 92.6 FL (ref 81.4–97.8)
MONOCYTES # BLD AUTO: 0.97 K/UL (ref 0–0.85)
MONOCYTES NFR BLD AUTO: 8.5 % (ref 0–13.4)
NEUTROPHILS # BLD AUTO: 8.06 K/UL (ref 1.82–7.42)
NEUTROPHILS NFR BLD: 70.4 % (ref 44–72)
NRBC # BLD AUTO: 0 K/UL
NRBC BLD-RTO: 0 /100 WBC (ref 0–0.2)
PLATELET # BLD AUTO: 270 K/UL (ref 164–446)
PMV BLD AUTO: 9.6 FL (ref 9–12.9)
POTASSIUM SERPL-SCNC: 4.1 MMOL/L (ref 3.6–5.5)
PROT SERPL-MCNC: 7.6 G/DL (ref 6–8.2)
RBC # BLD AUTO: 4.58 M/UL (ref 4.2–5.4)
RSV RNA SPEC QL NAA+PROBE: NEGATIVE
S PYO DNA SPEC NAA+PROBE: NOT DETECTED
SARS-COV-2 RNA RESP QL NAA+PROBE: NEGATIVE
SODIUM SERPL-SCNC: 137 MMOL/L (ref 135–145)
WBC # BLD AUTO: 11.5 K/UL (ref 4.8–10.8)

## 2024-07-06 PROCEDURE — 96365 THER/PROPH/DIAG IV INF INIT: CPT

## 2024-07-06 PROCEDURE — 99223 1ST HOSP IP/OBS HIGH 75: CPT | Mod: AI | Performed by: HOSPITALIST

## 2024-07-06 PROCEDURE — 70491 CT SOFT TISSUE NECK W/DYE: CPT

## 2024-07-06 PROCEDURE — A9270 NON-COVERED ITEM OR SERVICE: HCPCS | Performed by: HOSPITALIST

## 2024-07-06 PROCEDURE — 700111 HCHG RX REV CODE 636 W/ 250 OVERRIDE (IP): Mod: JZ | Performed by: EMERGENCY MEDICINE

## 2024-07-06 PROCEDURE — 700117 HCHG RX CONTRAST REV CODE 255: Performed by: EMERGENCY MEDICINE

## 2024-07-06 PROCEDURE — 700105 HCHG RX REV CODE 258: Performed by: EMERGENCY MEDICINE

## 2024-07-06 PROCEDURE — 700102 HCHG RX REV CODE 250 W/ 637 OVERRIDE(OP): Performed by: HOSPITALIST

## 2024-07-06 PROCEDURE — 87651 STREP A DNA AMP PROBE: CPT

## 2024-07-06 PROCEDURE — 700111 HCHG RX REV CODE 636 W/ 250 OVERRIDE (IP): Mod: JZ | Performed by: HOSPITALIST

## 2024-07-06 PROCEDURE — 99285 EMERGENCY DEPT VISIT HI MDM: CPT

## 2024-07-06 PROCEDURE — 0241U POCT CEPHEID COV-2, FLU A/B, RSV - PCR: CPT

## 2024-07-06 PROCEDURE — 700105 HCHG RX REV CODE 258: Performed by: HOSPITALIST

## 2024-07-06 PROCEDURE — 3078F DIAST BP <80 MM HG: CPT

## 2024-07-06 PROCEDURE — 770006 HCHG ROOM/CARE - MED/SURG/GYN SEMI*

## 2024-07-06 PROCEDURE — 80053 COMPREHEN METABOLIC PANEL: CPT

## 2024-07-06 PROCEDURE — 3074F SYST BP LT 130 MM HG: CPT

## 2024-07-06 PROCEDURE — 85025 COMPLETE CBC W/AUTO DIFF WBC: CPT

## 2024-07-06 PROCEDURE — 99215 OFFICE O/P EST HI 40 MIN: CPT

## 2024-07-06 PROCEDURE — 36415 COLL VENOUS BLD VENIPUNCTURE: CPT

## 2024-07-06 RX ORDER — AMOXICILLIN 500 MG/1
500 CAPSULE ORAL 3 TIMES DAILY
Status: ON HOLD | COMMUNITY
End: 2024-07-07

## 2024-07-06 RX ORDER — MORPHINE SULFATE 4 MG/ML
4 INJECTION INTRAVENOUS
Status: DISCONTINUED | OUTPATIENT
Start: 2024-07-06 | End: 2024-07-07 | Stop reason: HOSPADM

## 2024-07-06 RX ORDER — MULTIVIT-MIN/FA/LYCOPEN/LUTEIN .4-300-25
1 TABLET ORAL EVERY MORNING
COMMUNITY

## 2024-07-06 RX ORDER — DENOSUMAB 60 MG/ML
60 INJECTION SUBCUTANEOUS
COMMUNITY

## 2024-07-06 RX ORDER — SODIUM CHLORIDE, SODIUM LACTATE, POTASSIUM CHLORIDE, CALCIUM CHLORIDE 600; 310; 30; 20 MG/100ML; MG/100ML; MG/100ML; MG/100ML
INJECTION, SOLUTION INTRAVENOUS CONTINUOUS
Status: DISCONTINUED | OUTPATIENT
Start: 2024-07-06 | End: 2024-07-07 | Stop reason: HOSPADM

## 2024-07-06 RX ORDER — ACETAMINOPHEN 325 MG/1
650 TABLET ORAL EVERY 6 HOURS PRN
Status: DISCONTINUED | OUTPATIENT
Start: 2024-07-06 | End: 2024-07-07 | Stop reason: HOSPADM

## 2024-07-06 RX ORDER — OXYCODONE HYDROCHLORIDE 5 MG/1
5 TABLET ORAL
Status: DISCONTINUED | OUTPATIENT
Start: 2024-07-06 | End: 2024-07-07 | Stop reason: HOSPADM

## 2024-07-06 RX ORDER — ESCITALOPRAM OXALATE 10 MG/1
10 TABLET ORAL DAILY
Status: DISCONTINUED | OUTPATIENT
Start: 2024-07-07 | End: 2024-07-07 | Stop reason: HOSPADM

## 2024-07-06 RX ORDER — BUPROPION HYDROCHLORIDE 75 MG/1
75 TABLET ORAL 2 TIMES DAILY
Status: DISCONTINUED | OUTPATIENT
Start: 2024-07-07 | End: 2024-07-07 | Stop reason: HOSPADM

## 2024-07-06 RX ADMIN — AMPICILLIN AND SULBACTAM 3 G: 1; 2 INJECTION, POWDER, FOR SOLUTION INTRAMUSCULAR; INTRAVENOUS at 23:54

## 2024-07-06 RX ADMIN — AMPICILLIN AND SULBACTAM 3 G: 1; 2 INJECTION, POWDER, FOR SOLUTION INTRAMUSCULAR; INTRAVENOUS at 15:17

## 2024-07-06 RX ADMIN — AMPICILLIN AND SULBACTAM 3 G: 1; 2 INJECTION, POWDER, FOR SOLUTION INTRAMUSCULAR; INTRAVENOUS at 18:54

## 2024-07-06 RX ADMIN — SODIUM CHLORIDE, POTASSIUM CHLORIDE, SODIUM LACTATE AND CALCIUM CHLORIDE: 600; 310; 30; 20 INJECTION, SOLUTION INTRAVENOUS at 18:51

## 2024-07-06 RX ADMIN — ACETAMINOPHEN 650 MG: 325 TABLET ORAL at 18:44

## 2024-07-06 RX ADMIN — IOHEXOL 85 ML: 350 INJECTION, SOLUTION INTRAVENOUS at 13:45

## 2024-07-06 ASSESSMENT — ENCOUNTER SYMPTOMS
HEADACHES: 0
FEVER: 0
HOARSE VOICE: 1
SWOLLEN GLANDS: 0
TROUBLE SWALLOWING: 1
NECK PAIN: 1
FEVER: 0
ABDOMINAL PAIN: 0
CHILLS: 0
SORE THROAT: 1
CHILLS: 0
COUGH: 0
COUGH: 0
SHORTNESS OF BREATH: 0
VOMITING: 0
SHORTNESS OF BREATH: 0
STRIDOR: 0
DIARRHEA: 0

## 2024-07-06 ASSESSMENT — PAIN DESCRIPTION - PAIN TYPE: TYPE: ACUTE PAIN

## 2024-07-06 ASSESSMENT — FIBROSIS 4 INDEX
FIB4 SCORE: 0.89
FIB4 SCORE: 0.89

## 2024-07-07 ENCOUNTER — PHARMACY VISIT (OUTPATIENT)
Dept: PHARMACY | Facility: MEDICAL CENTER | Age: 69
End: 2024-07-07
Payer: COMMERCIAL

## 2024-07-07 VITALS
TEMPERATURE: 9.3 F | HEIGHT: 65 IN | HEART RATE: 62 BPM | SYSTOLIC BLOOD PRESSURE: 104 MMHG | DIASTOLIC BLOOD PRESSURE: 58 MMHG | BODY MASS INDEX: 25.49 KG/M2 | WEIGHT: 153 LBS | RESPIRATION RATE: 17 BRPM | OXYGEN SATURATION: 94 %

## 2024-07-07 PROCEDURE — 700102 HCHG RX REV CODE 250 W/ 637 OVERRIDE(OP): Performed by: HOSPITALIST

## 2024-07-07 PROCEDURE — 700111 HCHG RX REV CODE 636 W/ 250 OVERRIDE (IP): Mod: JZ | Performed by: HOSPITALIST

## 2024-07-07 PROCEDURE — 700105 HCHG RX REV CODE 258: Performed by: HOSPITALIST

## 2024-07-07 PROCEDURE — 700101 HCHG RX REV CODE 250: Performed by: OTOLARYNGOLOGY

## 2024-07-07 PROCEDURE — 0CJS8ZZ INSPECTION OF LARYNX, VIA NATURAL OR ARTIFICIAL OPENING ENDOSCOPIC: ICD-10-PCS | Performed by: OTOLARYNGOLOGY

## 2024-07-07 PROCEDURE — 99239 HOSP IP/OBS DSCHRG MGMT >30: CPT | Performed by: STUDENT IN AN ORGANIZED HEALTH CARE EDUCATION/TRAINING PROGRAM

## 2024-07-07 PROCEDURE — RXMED WILLOW AMBULATORY MEDICATION CHARGE: Performed by: STUDENT IN AN ORGANIZED HEALTH CARE EDUCATION/TRAINING PROGRAM

## 2024-07-07 PROCEDURE — A9270 NON-COVERED ITEM OR SERVICE: HCPCS | Performed by: HOSPITALIST

## 2024-07-07 RX ORDER — AMOXICILLIN AND CLAVULANATE POTASSIUM 400; 57 MG/5ML; MG/5ML
800 POWDER, FOR SUSPENSION ORAL 2 TIMES DAILY
Qty: 200 ML | Refills: 0 | Status: ACTIVE | OUTPATIENT
Start: 2024-07-07 | End: 2024-07-17

## 2024-07-07 RX ORDER — LIDOCAINE HYDROCHLORIDE 20 MG/ML
JELLY TOPICAL ONCE
Status: COMPLETED | OUTPATIENT
Start: 2024-07-07 | End: 2024-07-07

## 2024-07-07 RX ADMIN — LIDOCAINE HYDROCHLORIDE 6 ML: 20 JELLY TOPICAL at 10:00

## 2024-07-07 RX ADMIN — ESCITALOPRAM OXALATE 10 MG: 10 TABLET ORAL at 06:04

## 2024-07-07 RX ADMIN — SODIUM CHLORIDE, POTASSIUM CHLORIDE, SODIUM LACTATE AND CALCIUM CHLORIDE: 600; 310; 30; 20 INJECTION, SOLUTION INTRAVENOUS at 06:05

## 2024-07-07 RX ADMIN — AMPICILLIN AND SULBACTAM 3 G: 1; 2 INJECTION, POWDER, FOR SOLUTION INTRAMUSCULAR; INTRAVENOUS at 06:06

## 2024-07-07 RX ADMIN — BUPROPION HYDROCHLORIDE 75 MG: 75 TABLET, FILM COATED ORAL at 06:04

## 2024-07-07 ASSESSMENT — PAIN DESCRIPTION - PAIN TYPE: TYPE: ACUTE PAIN

## 2024-08-07 ENCOUNTER — HOSPITAL ENCOUNTER (OUTPATIENT)
Dept: RADIOLOGY | Facility: MEDICAL CENTER | Age: 69
End: 2024-08-07
Payer: MEDICARE

## 2024-08-07 PROCEDURE — 77067 SCR MAMMO BI INCL CAD: CPT

## 2024-08-26 ENCOUNTER — PATIENT MESSAGE (OUTPATIENT)
Dept: HEALTH INFORMATION MANAGEMENT | Facility: OTHER | Age: 69
End: 2024-08-26

## 2024-08-27 ENCOUNTER — TELEPHONE (OUTPATIENT)
Dept: ENDOCRINOLOGY | Facility: MEDICAL CENTER | Age: 69
End: 2024-08-27
Payer: MEDICARE

## 2024-08-27 NOTE — TELEPHONE ENCOUNTER
Called pt to remind them of their appt on 9/4/24 and that they had labs due prior to their appt. She said she had plans to get them done later this week.

## 2024-09-01 NOTE — PROGRESS NOTES
"Follow up Endocrinology Visit  Last visit  with Jai Rudolph on: 1/26/24  Referred by: ELDER Chang    Patient was presented for a telehealth consultation via secure and encrypted videoconferencing technology.    Location of Provider - office  Location of Patient - home  Patient Consent - yes  Platform used - Zoom  Total time - 40 min    Chief complaint:  Allyn Rosenberg, 69 y.o., female, who is here for follow up for severe osteoporosis management.     Interval history:   - since last visit no significant events  - reviewed recent labs    Osteoporosis:  - diagnosed based on DEXA scan, lowest T-score = - 3.0 in lumbar spine was noted in 9/2022  - history of low impact fractures: L ankle fracture x 2 (10 - 12 years ago + 8/2023 - fell in the bathroom)  - change in height: denies  - negative history of falls  - \"not having a great balance\" after head trauma in the past  -  previous treatment: Fosamax -> joint pain, Tymlos - 3 mo but got very expensive -> get 1 shot of Prolia on 6/22/24, tolerated well  - daily Ca intake - 1.2 g  - daily vit D intake - ?  - exercise: walking exercise in the morning, cardio training  RISK FACTORS:  - history of hysterectectomy or oophorectomy - in 30s -> HRT x 6 years  - denies history of Ca, phosphorus, vit D, thyroid/parathryoid disorders, kidney stones, CKD, liver disease, chronic malabsorption/diarrhea, immobilization, smoking, alcohol abuse, chronic steroid use  - Fhx osteoporosis - sister  Factors affecting osteoporosis treatment choice:  History of prior radiation - denies  GERD/dysphagia - yes  Hx of teeth problems, upcoming dental work  - plans for tooth implant  Hx CKD (baseline creatinine) - denies  Hx of CVA or MI - denies    Medications:  Current Outpatient Medications:     denosumab (PROLIA) 60 MG/ML Solution Prefilled Syringe injection, Inject 60 mg under the skin every 6 months., Disp: , Rfl:     Multiple Vitamins-Minerals (CENTRUM SILVER ADULT 50+) Tab, " "Take 1 Tablet by mouth every morning., Disp: , Rfl:     valacyclovir (VALTREX) 1 GM Tab, Take 2 Tablets by mouth every 12 hours as needed (AT THE FIRST SIGN OF A COLD SORE FOR 2 DOSES THEN STOP.) for up to 40 doses., Disp: 20 Tablet, Rfl: 3    famotidine (PEPCID) 40 MG Tab, Take 1 Tablet by mouth every day. (Patient taking differently: Take 40 mg by mouth 1 time a day as needed (Heartburn).), Disp: 90 Tablet, Rfl: 3    buPROPion (WELLBUTRIN XL) 150 MG XL tablet, TAKE 1 TABLET BY MOUTH EVERY DAY IN THE MORNING, Disp: 90 Tablet, Rfl: 3    escitalopram (LEXAPRO) 10 MG Tab, Take 1 Tablet by mouth every day., Disp: 90 Tablet, Rfl: 3    Insulin Pen Needle 32 G x 4 mm (BD PEN NEEDLE FREDY 2ND GEN), Use 1 pen needle as directed every day., Disp: 100 Each, Rfl: 11    Specialty Vitamins Products (ONE-A-DAY BONE STRENGTH PO), Take 1 Tablet by mouth every morning., Disp: , Rfl:     Physical Examination:   Vital signs: Ht 1.651 m (5' 5\")   Wt 69.4 kg (153 lb)   BMI 25.46 kg/m²   General: No distress, cooperative, well dressed and well nourished. No cushingoid features.   Resp: Normal effort  Neuro: Alert and oriented  Psych: Normal mood and affect    Labs:  - reviewed  Osteoporosis work up:   Reference Range  02/18/23 08/30/23 04/30/24 07/06/24   Hb 12.0 - 16.0 g/dL   13.4 14.4   Hct 37.0 - 47.0 %   39.9 42.4   Sodium 135 - 145 mmol/L 140 137 140 137   Creatinine 0.50 - 1.40 mg/dL 0.72 0.83 0.82 0.59   GFR (CKD-EPI) >60 mL/min/1.73 m 2 90 76 77 97   Calcium 8.5 - 10.5 mg/dL 9.8 9.9 9.1 9.5   Correct Calcium 8.5 - 10.5 mg/dL 9.4 9.5 8.9 9.0   AST(SGOT) 12 - 45 U/L 15 16 16 18   ALT(SGPT) 2 - 50 U/L 17 17 17 26   ALP 30 - 99 U/L 109 (H) 98 86 124 (H)   Phosphorus 2.5 - 4.5 mg/dL 3.7 3.6     Vit D 30 - 100 ng/mL 31 48 32    CTX pg/mL 487  375    TSH 0.380 - 5.330 uIU/mL 2.930      Pth, Intact 14.0 - 72.0 pg/mL 71.7 52.7       SPEP:   Reference Range 02/18/23   Gamma Globulin 0.62 - 1.51 g/dL 0.90   Alpha-1 Globulin 0.19 - 0.46 " g/dL 0.37   Alpha-2 Globulin 0.48 - 1.05 g/dL 0.91   Beta Globulin 0.48 - 1.10 g/dL 1.01     Imaging:  - reviewed  DEXA scan:  Date Machine L1- L4  BMD/Z-score/ T-score LFN  BMD/ Z-score/ T-score FRAX Rx    9/28/2022    [employer] Prodigy unit   0.822 g/cm2 / - 3.0  0.798 g/cm2 / - 1.7  34.4% / 8.5%      Assessment and Plan:  # Age-related osteoporosis  # Closed displaced fracture of lateral malleolus of left fibula   - diagnosed based on DEXA scan in 9/2022- lowest T-score = - 3.0 in a lumbar spine  - had 2 fragility fractures of L ankle x 2 with GLF  - identifiable risk factors: postoperative early menopause, FHx of osteoporosis,  race  - rule out secondary causes of osteoporosis: vit D deficiency, hyperparathyroidism, hyperthyroidism, hypophosphatemia, hypophosphatesia, CKD/liver disease  - prior therapy: Fosamax -> stopped due to severe joint pains -> Tymlos x 3 mo stopped due to cost issues -> Prolia x 1 on 6/22/24  - will continue Prolia infusions for at least 2 years then will discuss possible transition to Reclast  - obtain new DEXA scan   Plan:  Repeat DEXA scan (9/29/24)  Continue Prolia infusions for total 4, then discuss possible transition to Reclast, next Prolia injection  is in 12/2024  Repeat CMP, vit D levels.   4.   Continue Ca and vit D supplementation.   5.   Fall precautions.    RTC: 3 mo    Total time (face-to-face and non-face-to face time):  40 min - extensive discussion of diagnoses, treatment, prognosis, medical charts, lab, imaging, pathology review, documentation.      Plan reviewed with the patient and agreed with plan.  All questions answered to patient's satisfaction.  Thank you kindly for allowing me to participate in the care plan for this patient.    Nel Patel MD    CC:   ELDER Chang

## 2024-09-04 ENCOUNTER — TELEMEDICINE (OUTPATIENT)
Dept: ENDOCRINOLOGY | Facility: MEDICAL CENTER | Age: 69
End: 2024-09-04
Attending: STUDENT IN AN ORGANIZED HEALTH CARE EDUCATION/TRAINING PROGRAM
Payer: MEDICARE

## 2024-09-04 VITALS — BODY MASS INDEX: 25.49 KG/M2 | WEIGHT: 153 LBS | HEIGHT: 65 IN

## 2024-09-04 DIAGNOSIS — M81.0 POSTMENOPAUSAL OSTEOPOROSIS: ICD-10-CM

## 2024-09-04 PROCEDURE — 99215 OFFICE O/P EST HI 40 MIN: CPT | Mod: 95 | Performed by: STUDENT IN AN ORGANIZED HEALTH CARE EDUCATION/TRAINING PROGRAM

## 2024-09-04 RX ORDER — METHYLPREDNISOLONE SODIUM SUCCINATE 125 MG/2ML
125 INJECTION, POWDER, LYOPHILIZED, FOR SOLUTION INTRAMUSCULAR; INTRAVENOUS PRN
OUTPATIENT
Start: 2024-12-23

## 2024-09-04 RX ORDER — EPINEPHRINE 1 MG/ML(1)
0.5 AMPUL (ML) INJECTION PRN
OUTPATIENT
Start: 2024-12-23

## 2024-09-04 RX ORDER — DIPHENHYDRAMINE HYDROCHLORIDE 50 MG/ML
50 INJECTION INTRAMUSCULAR; INTRAVENOUS PRN
OUTPATIENT
Start: 2024-12-23

## 2024-09-04 ASSESSMENT — FIBROSIS 4 INDEX: FIB4 SCORE: 0.92

## 2024-09-10 ENCOUNTER — OFFICE VISIT (OUTPATIENT)
Dept: URGENT CARE | Facility: CLINIC | Age: 69
End: 2024-09-10
Payer: MEDICARE

## 2024-09-10 VITALS
SYSTOLIC BLOOD PRESSURE: 110 MMHG | DIASTOLIC BLOOD PRESSURE: 68 MMHG | HEART RATE: 75 BPM | RESPIRATION RATE: 17 BRPM | HEIGHT: 66 IN | WEIGHT: 160 LBS | BODY MASS INDEX: 25.71 KG/M2 | TEMPERATURE: 97 F | OXYGEN SATURATION: 99 %

## 2024-09-10 DIAGNOSIS — B02.9 HERPES ZOSTER WITHOUT COMPLICATION: ICD-10-CM

## 2024-09-10 PROCEDURE — 3074F SYST BP LT 130 MM HG: CPT | Performed by: FAMILY MEDICINE

## 2024-09-10 PROCEDURE — 3078F DIAST BP <80 MM HG: CPT | Performed by: FAMILY MEDICINE

## 2024-09-10 PROCEDURE — 99213 OFFICE O/P EST LOW 20 MIN: CPT | Performed by: FAMILY MEDICINE

## 2024-09-10 ASSESSMENT — FIBROSIS 4 INDEX: FIB4 SCORE: 0.92

## 2024-09-11 NOTE — PROGRESS NOTES
"Subjective:   Allyn Rosenberg is a 69 y.o. female who presents for Herpes Zoster (Patient is here today because she believes that has shingles. )      HPI    Review of Systems   Skin:  Positive for rash.       Medications, Allergies, and current problem list reviewed today in Epic.     Objective:     /68   Pulse 75   Temp 36.1 °C (97 °F) (Temporal)   Resp 17   Ht 1.664 m (5' 5.5\")   Wt 72.6 kg (160 lb)   SpO2 99%     Physical Exam  Vitals and nursing note reviewed.   Constitutional:       Appearance: Normal appearance.   Cardiovascular:      Rate and Rhythm: Normal rate and regular rhythm.      Pulses: Normal pulses.      Heart sounds: Normal heart sounds.   Pulmonary:      Effort: Pulmonary effort is normal.      Breath sounds: Normal breath sounds.   Skin:     Comments: Zoster rash left abd and flank, burning, grouped vesicles   Neurological:      Mental Status: She is alert.         Assessment/Plan:     Diagnosis and associated orders:     1. Herpes zoster without complication           Comments/MDM:     Has rx for Valtrex, recommend lidocaine patch         Differential diagnosis, natural history, supportive care, and indications for immediate follow-up discussed.    Advised the patient to follow-up with the primary care physician for recheck, reevaluation, and consideration of further management.    Please note that this dictation was created using voice recognition software. I have made a reasonable attempt to correct obvious errors, but I expect that there are errors of grammar and possibly content that I did not discover before finalizing the note.    This note was electronically signed by Jay Jay Copeland M.D.  "

## 2024-10-03 ENCOUNTER — HOSPITAL ENCOUNTER (EMERGENCY)
Facility: MEDICAL CENTER | Age: 69
End: 2024-10-03
Attending: EMERGENCY MEDICINE
Payer: MEDICARE

## 2024-10-03 ENCOUNTER — APPOINTMENT (OUTPATIENT)
Dept: RADIOLOGY | Facility: MEDICAL CENTER | Age: 69
End: 2024-10-03
Attending: EMERGENCY MEDICINE
Payer: MEDICARE

## 2024-10-03 VITALS
DIASTOLIC BLOOD PRESSURE: 56 MMHG | WEIGHT: 156.97 LBS | SYSTOLIC BLOOD PRESSURE: 122 MMHG | OXYGEN SATURATION: 94 % | HEART RATE: 62 BPM | BODY MASS INDEX: 26.15 KG/M2 | TEMPERATURE: 97.2 F | HEIGHT: 65 IN | RESPIRATION RATE: 16 BRPM

## 2024-10-03 DIAGNOSIS — S09.90XA CLOSED HEAD INJURY, INITIAL ENCOUNTER: ICD-10-CM

## 2024-10-03 LAB — EKG IMPRESSION: NORMAL

## 2024-10-03 PROCEDURE — 72125 CT NECK SPINE W/O DYE: CPT

## 2024-10-03 PROCEDURE — 93005 ELECTROCARDIOGRAM TRACING: CPT

## 2024-10-03 PROCEDURE — 99284 EMERGENCY DEPT VISIT MOD MDM: CPT

## 2024-10-03 PROCEDURE — 70450 CT HEAD/BRAIN W/O DYE: CPT

## 2024-10-03 PROCEDURE — 93005 ELECTROCARDIOGRAM TRACING: CPT | Performed by: EMERGENCY MEDICINE

## 2024-10-03 ASSESSMENT — FIBROSIS 4 INDEX: FIB4 SCORE: 0.92

## 2024-11-14 ENCOUNTER — TELEPHONE (OUTPATIENT)
Dept: HEALTH INFORMATION MANAGEMENT | Facility: OTHER | Age: 69
End: 2024-11-14
Payer: MEDICARE

## 2024-11-22 ENCOUNTER — OFFICE VISIT (OUTPATIENT)
Dept: ENDOCRINOLOGY | Facility: MEDICAL CENTER | Age: 69
End: 2024-11-22
Attending: STUDENT IN AN ORGANIZED HEALTH CARE EDUCATION/TRAINING PROGRAM
Payer: MEDICARE

## 2024-11-22 VITALS
WEIGHT: 158.7 LBS | HEIGHT: 66 IN | DIASTOLIC BLOOD PRESSURE: 64 MMHG | BODY MASS INDEX: 25.51 KG/M2 | OXYGEN SATURATION: 98 % | HEART RATE: 74 BPM | SYSTOLIC BLOOD PRESSURE: 110 MMHG

## 2024-11-22 DIAGNOSIS — M81.0 POSTMENOPAUSAL OSTEOPOROSIS: ICD-10-CM

## 2024-11-22 DIAGNOSIS — E78.5 DYSLIPIDEMIA: ICD-10-CM

## 2024-11-22 PROCEDURE — 3078F DIAST BP <80 MM HG: CPT | Performed by: STUDENT IN AN ORGANIZED HEALTH CARE EDUCATION/TRAINING PROGRAM

## 2024-11-22 PROCEDURE — 99211 OFF/OP EST MAY X REQ PHY/QHP: CPT | Performed by: STUDENT IN AN ORGANIZED HEALTH CARE EDUCATION/TRAINING PROGRAM

## 2024-11-22 PROCEDURE — 3074F SYST BP LT 130 MM HG: CPT | Performed by: STUDENT IN AN ORGANIZED HEALTH CARE EDUCATION/TRAINING PROGRAM

## 2024-11-22 PROCEDURE — 99215 OFFICE O/P EST HI 40 MIN: CPT | Performed by: STUDENT IN AN ORGANIZED HEALTH CARE EDUCATION/TRAINING PROGRAM

## 2024-11-22 ASSESSMENT — FIBROSIS 4 INDEX: FIB4 SCORE: 0.92

## 2024-11-22 NOTE — PROGRESS NOTES
"Follow up Endocrinology Visit  Last visit  with Jai Rudolph on: 1/26/24  Last visit with me on: 9/4/24  Referred by: ELDER Chang    Chief complaint:  Allyn Rosenberg, 69 y.o., female, who is here for follow up for severe osteoporosis management.     Interval history:   - patient reports being sick recently, having more cough, some neck discomfort, recently diagnosed infection? Which she correlates with Prolia and would like to stop the medication  - no new labs or DEXA scan to review  Prior notes:  9/4/24  - since last visit no significant events  - reviewed recent labs    Osteoporosis:  - diagnosed based on DEXA scan, lowest T-score = - 3.0 in lumbar spine was noted in 9/2022  - history of low impact fractures: L ankle fracture x 2 (10 - 12 years ago + 8/2023 - fell in the bathroom)  - change in height: denies  - negative history of falls  - \"not having a great balance\" after head trauma in the past  -  previous treatment: Fosamax -> joint pain, Tymlos - 3 mo but got very expensive -> get 1 shot of Prolia on 6/22/24, tolerated well  - daily Ca intake - 1.2 g  - daily vit D intake - ?  - exercise: walking exercise in the morning, cardio training  RISK FACTORS:  - history of hysterectectomy or oophorectomy - in 30s -> HRT x 6 years  - denies history of Ca, phosphorus, vit D, thyroid/parathryoid disorders, kidney stones, CKD, liver disease, chronic malabsorption/diarrhea, immobilization, smoking, alcohol abuse, chronic steroid use  - Fhx osteoporosis - sister  Factors affecting osteoporosis treatment choice:  History of prior radiation - denies  GERD/dysphagia - yes  Hx of teeth problems, upcoming dental work  - plans for tooth implant  Hx CKD (baseline creatinine) - denies  Hx of CVA or MI - denies    Medications:  Current Outpatient Medications:     denosumab (PROLIA) 60 MG/ML Solution Prefilled Syringe injection, Inject 60 mg under the skin every 6 months., Disp: , Rfl:     Multiple Vitamins-Minerals " "(CENTRUM SILVER ADULT 50+) Tab, Take 1 Tablet by mouth every morning., Disp: , Rfl:     valacyclovir (VALTREX) 1 GM Tab, Take 2 Tablets by mouth every 12 hours as needed (AT THE FIRST SIGN OF A COLD SORE FOR 2 DOSES THEN STOP.) for up to 40 doses., Disp: 20 Tablet, Rfl: 3    famotidine (PEPCID) 40 MG Tab, Take 1 Tablet by mouth every day. (Patient taking differently: Take 40 mg by mouth 1 time a day as needed (Heartburn).), Disp: 90 Tablet, Rfl: 3    buPROPion (WELLBUTRIN XL) 150 MG XL tablet, TAKE 1 TABLET BY MOUTH EVERY DAY IN THE MORNING, Disp: 90 Tablet, Rfl: 3    escitalopram (LEXAPRO) 10 MG Tab, Take 1 Tablet by mouth every day., Disp: 90 Tablet, Rfl: 3    Insulin Pen Needle 32 G x 4 mm (BD PEN NEEDLE FREDY 2ND GEN), Use 1 pen needle as directed every day., Disp: 100 Each, Rfl: 11    Specialty Vitamins Products (ONE-A-DAY BONE STRENGTH PO), Take 1 Tablet by mouth every morning., Disp: , Rfl:     Physical Examination:   Vital signs: /64   Pulse 74   Ht 1.664 m (5' 5.5\") Comment: pt stated  Wt 72 kg (158 lb 11.2 oz)   SpO2 98%   BMI 26.01 kg/m²   General: No distress, cooperative, well dressed and well nourished. No cushingoid features.   Resp: Normal effort  Neuro: Alert and oriented  Psych: Normal mood and affect    Labs:  - reviewed  Osteoporosis work up:   Reference Range  02/18/23 08/30/23 04/30/24 07/06/24   Hb 12.0 - 16.0 g/dL   13.4 14.4   Hct 37.0 - 47.0 %   39.9 42.4   Sodium 135 - 145 mmol/L 140 137 140 137   Creatinine 0.50 - 1.40 mg/dL 0.72 0.83 0.82 0.59   GFR (CKD-EPI) >60 mL/min/1.73 m 2 90 76 77 97   Calcium 8.5 - 10.5 mg/dL 9.8 9.9 9.1 9.5   Correct Calcium 8.5 - 10.5 mg/dL 9.4 9.5 8.9 9.0   AST(SGOT) 12 - 45 U/L 15 16 16 18   ALT(SGPT) 2 - 50 U/L 17 17 17 26   ALP 30 - 99 U/L 109 (H) 98 86 124 (H)   Phosphorus 2.5 - 4.5 mg/dL 3.7 3.6     Vit D 30 - 100 ng/mL 31 48 32    CTX pg/mL 487  375    TSH 0.380 - 5.330 uIU/mL 2.930      Pth, Intact 14.0 - 72.0 pg/mL 71.7 52.7       SPEP:   " Reference Range 02/18/23   Gamma Globulin 0.62 - 1.51 g/dL 0.90   Alpha-1 Globulin 0.19 - 0.46 g/dL 0.37   Alpha-2 Globulin 0.48 - 1.05 g/dL 0.91   Beta Globulin 0.48 - 1.10 g/dL 1.01     Imaging:  - reviewed  DEXA scan:  Date Machine L1- L4  BMD/Z-score/ T-score LFN  BMD/ Z-score/ T-score FRAX Rx    9/28/2022    [employer] Prodigy unit   0.822 g/cm2 / - 3.0  0.798 g/cm2 / - 1.7  34.4% / 8.5%              Assessment and Plan:  # Age-related osteoporosis  # Closed displaced fracture of lateral malleolus of left fibula   - discussed available anti-osteoporosis treatment options, risks and benefits  - I do not think reported symptoms?infection? Is related to Prolia, but ok to switch to Reclast  - will get new DEXA scan report, repeat CMP, vit D, obtain CTX/PN1P   Prior notes:  9/4/24  - diagnosed based on DEXA scan in 9/2022- lowest T-score = - 3.0 in a lumbar spine  - had 2 fragility fractures of L ankle x 2 with GLF  - identifiable risk factors: postoperative early menopause, FHx of osteoporosis,  race  - rule out secondary causes of osteoporosis: vit D deficiency, hyperparathyroidism, hyperthyroidism, hypophosphatemia, hypophosphatesia, CKD/liver disease  - prior therapy: Fosamax -> stopped due to severe joint pains -> Tymlos x 3 mo stopped due to cost issues -> Prolia x 1 on 6/22/24  - will continue Prolia infusions for at least 2 years then will discuss possible transition to Reclast  - obtain new DEXA scan   Plan:  Repeat DEXA scan (due since 9/29/24)  Ok to stop Prolia as long as pt gets Reclast in 12/2024  Repeat CMP, vit D levels, obtain CTX to monitor resorption markers off Prolia  4.   Continue Ca and vit D supplementation.   5.   Fall precautions.    RTC: 3 weeks    Total time (face-to-face and non-face-to face time):  40 min - extensive discussion of diagnoses, treatment, prognosis,  documentation.      Plan reviewed with the patient and agreed with plan.  All questions answered to patient's satisfaction.   Thank you kindly for allowing me to participate in the care plan for this patient.    Nel Patel MD    CC:   ELDER Chang

## 2024-12-03 ENCOUNTER — TELEPHONE (OUTPATIENT)
Dept: ENDOCRINOLOGY | Facility: MEDICAL CENTER | Age: 69
End: 2024-12-03
Payer: MEDICARE

## 2024-12-04 ENCOUNTER — HOSPITAL ENCOUNTER (OUTPATIENT)
Dept: LAB | Facility: MEDICAL CENTER | Age: 69
End: 2024-12-04
Payer: MEDICARE

## 2024-12-04 DIAGNOSIS — E78.5 DYSLIPIDEMIA: ICD-10-CM

## 2024-12-04 DIAGNOSIS — M81.0 POSTMENOPAUSAL OSTEOPOROSIS: ICD-10-CM

## 2024-12-04 LAB
25(OH)D3 SERPL-MCNC: 31 NG/ML (ref 30–100)
ALBUMIN SERPL BCP-MCNC: 4.6 G/DL (ref 3.2–4.9)
ALBUMIN/GLOB SERPL: 1.5 G/DL
ALP SERPL-CCNC: 93 U/L (ref 30–99)
ALT SERPL-CCNC: 38 U/L (ref 2–50)
ANION GAP SERPL CALC-SCNC: 13 MMOL/L (ref 7–16)
AST SERPL-CCNC: 31 U/L (ref 12–45)
BILIRUB SERPL-MCNC: 0.6 MG/DL (ref 0.1–1.5)
BUN SERPL-MCNC: 13 MG/DL (ref 8–22)
CALCIUM ALBUM COR SERPL-MCNC: 9 MG/DL (ref 8.5–10.5)
CALCIUM SERPL-MCNC: 9.5 MG/DL (ref 8.5–10.5)
CHLORIDE SERPL-SCNC: 98 MMOL/L (ref 96–112)
CHOLEST SERPL-MCNC: 276 MG/DL (ref 100–199)
CO2 SERPL-SCNC: 24 MMOL/L (ref 20–33)
CREAT SERPL-MCNC: 0.77 MG/DL (ref 0.5–1.4)
FASTING STATUS PATIENT QL REPORTED: NORMAL
GFR SERPLBLD CREATININE-BSD FMLA CKD-EPI: 83 ML/MIN/1.73 M 2
GLOBULIN SER CALC-MCNC: 3 G/DL (ref 1.9–3.5)
GLUCOSE SERPL-MCNC: 101 MG/DL (ref 65–99)
HDLC SERPL-MCNC: 55 MG/DL
LDLC SERPL CALC-MCNC: 181 MG/DL
PHOSPHATE SERPL-MCNC: 3.4 MG/DL (ref 2.5–4.5)
POTASSIUM SERPL-SCNC: 4 MMOL/L (ref 3.6–5.5)
PROT SERPL-MCNC: 7.6 G/DL (ref 6–8.2)
PTH-INTACT SERPL-MCNC: 65.7 PG/ML (ref 14–72)
SODIUM SERPL-SCNC: 135 MMOL/L (ref 135–145)
T4 FREE SERPL-MCNC: 1.11 NG/DL (ref 0.93–1.7)
TRIGL SERPL-MCNC: 200 MG/DL (ref 0–149)

## 2024-12-04 PROCEDURE — 82523 COLLAGEN CROSSLINKS: CPT

## 2024-12-04 PROCEDURE — 36415 COLL VENOUS BLD VENIPUNCTURE: CPT

## 2024-12-04 PROCEDURE — 84100 ASSAY OF PHOSPHORUS: CPT

## 2024-12-04 PROCEDURE — 83970 ASSAY OF PARATHORMONE: CPT

## 2024-12-04 PROCEDURE — 80061 LIPID PANEL: CPT

## 2024-12-04 PROCEDURE — 80053 COMPREHEN METABOLIC PANEL: CPT

## 2024-12-04 PROCEDURE — 84439 ASSAY OF FREE THYROXINE: CPT

## 2024-12-04 PROCEDURE — 82306 VITAMIN D 25 HYDROXY: CPT

## 2024-12-06 ENCOUNTER — HOSPITAL ENCOUNTER (OUTPATIENT)
Dept: RADIOLOGY | Facility: MEDICAL CENTER | Age: 69
End: 2024-12-06
Attending: STUDENT IN AN ORGANIZED HEALTH CARE EDUCATION/TRAINING PROGRAM
Payer: MEDICARE

## 2024-12-06 DIAGNOSIS — M81.0 POSTMENOPAUSAL OSTEOPOROSIS: ICD-10-CM

## 2024-12-06 PROCEDURE — 77080 DXA BONE DENSITY AXIAL: CPT

## 2024-12-07 LAB — COLLAGEN CTX SERPL-MCNC: 83 PG/ML

## 2024-12-13 ENCOUNTER — TELEMEDICINE (OUTPATIENT)
Dept: ENDOCRINOLOGY | Facility: MEDICAL CENTER | Age: 69
End: 2024-12-13
Attending: STUDENT IN AN ORGANIZED HEALTH CARE EDUCATION/TRAINING PROGRAM
Payer: MEDICARE

## 2024-12-13 VITALS — HEIGHT: 66 IN | WEIGHT: 151 LBS | BODY MASS INDEX: 24.27 KG/M2

## 2024-12-13 DIAGNOSIS — M81.0 POSTMENOPAUSAL OSTEOPOROSIS: ICD-10-CM

## 2024-12-13 PROCEDURE — 99214 OFFICE O/P EST MOD 30 MIN: CPT | Mod: 95 | Performed by: STUDENT IN AN ORGANIZED HEALTH CARE EDUCATION/TRAINING PROGRAM

## 2024-12-13 RX ORDER — 0.9 % SODIUM CHLORIDE 0.9 %
VIAL (ML) INJECTION PRN
Status: CANCELLED | OUTPATIENT
Start: 2024-12-18

## 2024-12-13 RX ORDER — ZOLEDRONIC ACID 0.05 MG/ML
5 INJECTION, SOLUTION INTRAVENOUS ONCE
Status: CANCELLED | OUTPATIENT
Start: 2024-12-18 | End: 2024-12-18

## 2024-12-13 RX ORDER — 0.9 % SODIUM CHLORIDE 0.9 %
3 VIAL (ML) INJECTION PRN
Status: CANCELLED | OUTPATIENT
Start: 2024-12-18

## 2024-12-13 RX ORDER — EPINEPHRINE 1 MG/ML(1)
0.5 AMPUL (ML) INJECTION PRN
Status: CANCELLED | OUTPATIENT
Start: 2024-12-18

## 2024-12-13 RX ORDER — METHYLPREDNISOLONE SODIUM SUCCINATE 125 MG/2ML
125 INJECTION, POWDER, LYOPHILIZED, FOR SOLUTION INTRAMUSCULAR; INTRAVENOUS PRN
Status: CANCELLED | OUTPATIENT
Start: 2024-12-18

## 2024-12-13 RX ORDER — SODIUM CHLORIDE 9 MG/ML
INJECTION, SOLUTION INTRAVENOUS CONTINUOUS
Status: CANCELLED | OUTPATIENT
Start: 2024-12-18

## 2024-12-13 RX ORDER — ACETAMINOPHEN 325 MG/1
650 TABLET ORAL ONCE
Status: CANCELLED | OUTPATIENT
Start: 2024-12-18 | End: 2024-12-18

## 2024-12-13 RX ORDER — DIPHENHYDRAMINE HYDROCHLORIDE 50 MG/ML
50 INJECTION INTRAMUSCULAR; INTRAVENOUS PRN
Status: CANCELLED | OUTPATIENT
Start: 2024-12-18

## 2024-12-13 RX ORDER — 0.9 % SODIUM CHLORIDE 0.9 %
10 VIAL (ML) INJECTION PRN
Status: CANCELLED | OUTPATIENT
Start: 2024-12-18

## 2024-12-13 ASSESSMENT — FIBROSIS 4 INDEX: FIB4 SCORE: 1.3

## 2024-12-13 NOTE — PROGRESS NOTES
"Follow up Endocrinology Visit  Last visit  with Jai Rudolph on: 1/26/24  Last visit with me on: 11/22/24  Referred by: MALLORY Chang.    Patient was presented for a telehealth consultation via secure and encrypted videoconferencing technology.    Location of Provider - office  Location of Patient - [facility]  Patient Consent - yes  Platform used - Zoom  Total time - min      Chief complaint:  Allyn Rosenberg, 69 y.o., female, who is here for follow up for severe osteoporosis management.     Interval history:   - now recovering from COVID  -  planning to go to [place] to see her daughter who has a breast cancer  - reviewed most recent labs and DEXA scan  Prior notes:  9/4/24  - since last visit no significant events  - reviewed recent labs  11/22/24  - patient reports being sick recently, having more cough, some neck discomfort, recently diagnosed infection? Which she correlates with Prolia and would like to stop the medication  - no new labs or DEXA scan to review    Osteoporosis:  - diagnosed based on DEXA scan, lowest T-score = - 3.0 in lumbar spine was noted in 9/2022  - history of low impact fractures: L ankle fracture x 2 (10 - 12 years ago + 8/2023 - fell in the bathroom)  - change in height: denies  - negative history of falls  - \"not having a great balance\" after head trauma in the past  -  previous treatment: Fosamax -> joint pain, Tymlos - 3 mo but got very expensive -> get 1 shot of Prolia on 6/22/24, tolerated well  - daily Ca intake - 1.2 g  - daily vit D intake - ?  - exercise: walking exercise in the morning, cardio training  RISK FACTORS:  - history of hysterectectomy or oophorectomy - in 30s -> HRT x 6 years  - denies history of Ca, phosphorus, vit D, thyroid/parathryoid disorders, kidney stones, CKD, liver disease, chronic malabsorption/diarrhea, immobilization, smoking, alcohol abuse, chronic steroid use  - Fhx osteoporosis - sister  Factors affecting osteoporosis treatment " "choice:  History of prior radiation - denies  GERD/dysphagia - yes  Hx of teeth problems, upcoming dental work  - plans for tooth implant  Hx CKD (baseline creatinine) - denies  Hx of CVA or MI - denies    Medications:  Current Outpatient Medications:     Nirmatrelvir&Ritonavir 300/100 20 x 150 MG & 10 x 100MG Tablet Therapy Pack, Take 3 Tablets by mouth every 12 hours. TAKE ACCORDING TO PACKAGE INSTRUCTIONS, Disp: 1 Each, Rfl: 0    denosumab (PROLIA) 60 MG/ML Solution Prefilled Syringe injection, Inject 60 mg under the skin every 6 months., Disp: , Rfl:     Multiple Vitamins-Minerals (CENTRUM SILVER ADULT 50+) Tab, Take 1 Tablet by mouth every morning., Disp: , Rfl:     valacyclovir (VALTREX) 1 GM Tab, Take 2 Tablets by mouth every 12 hours as needed (AT THE FIRST SIGN OF A COLD SORE FOR 2 DOSES THEN STOP.) for up to 40 doses., Disp: 20 Tablet, Rfl: 3    famotidine (PEPCID) 40 MG Tab, Take 1 Tablet by mouth every day. (Patient taking differently: Take 40 mg by mouth 1 time a day as needed (Heartburn).), Disp: 90 Tablet, Rfl: 3    buPROPion (WELLBUTRIN XL) 150 MG XL tablet, TAKE 1 TABLET BY MOUTH EVERY DAY IN THE MORNING, Disp: 90 Tablet, Rfl: 3    escitalopram (LEXAPRO) 10 MG Tab, Take 1 Tablet by mouth every day., Disp: 90 Tablet, Rfl: 3    Insulin Pen Needle 32 G x 4 mm (BD PEN NEEDLE FREDY 2ND GEN), Use 1 pen needle as directed every day., Disp: 100 Each, Rfl: 11    Specialty Vitamins Products (ONE-A-DAY BONE STRENGTH PO), Take 1 Tablet by mouth every morning., Disp: , Rfl:     Physical Examination:   Vital signs:Ht 1.664 m (5' 5.5\")   Wt 68.5 kg (151 lb)   BMI 24.75 kg/m²   General: No distress, cooperative, well dressed and well nourished. No cushingoid features.   Resp: Normal effort  Neuro: Alert and oriented  Psych: Normal mood and affect    Labs:  - reviewed  Osteoporosis work up:   Reference Range  02/18/23 08/30/23  04/30/24  07/06/24 12/04/24   Hb 12.0 - 16.0 g/dL   13.4 14.4    Hct 37.0 - 47.0 %   " 39.9 42.4    Sodium 135 - 145 mmol/L 140 137 140 137 135   Creatinine 0.50 - 1.40 mg/dL 0.72 0.83 0.82 0.59 0.77   GFR (CKD-EPI) >60 mL/min/1.73 m 2 90 76 77 97 83   Calcium 8.5 - 10.5 mg/dL 9.8 9.9 9.1 9.5 9.5   Correct Calcium 8.5 - 10.5 mg/dL 9.4 9.5 8.9 9.0 9.0   AST(SGOT) 12 - 45 U/L 15 16 16 18 31   ALT(SGPT) 2 - 50 U/L 17 17 17 26 38   ALP 30 - 99 U/L 109 (H) 98 86 124 (H) 93   Phosphorus 2.5 - 4.5 mg/dL 3.7 3.6   3.4   Vit D 30 - 100 ng/mL 31 48 32     CTX pg/mL 487  375  83   TSH 0.380 - 5.330 uIU/mL 2.930       Pth, Intact 14.0 - 72.0 pg/mL 71.7 52.7   65.7     SPEP:   Reference Range 02/18/23   Gamma Globulin 0.62 - 1.51 g/dL 0.90   Alpha-1 Globulin 0.19 - 0.46 g/dL 0.37   Alpha-2 Globulin 0.48 - 1.05 g/dL 0.91   Beta Globulin 0.48 - 1.10 g/dL 1.01     Imaging:  - reviewed  DEXA scan:  Date Machine L1- L4  BMD/Z-score/ T-score LFN  BMD/ Z-score/ T-score FRAX Rx    9/28/2022    [employer] Prodigy unit   0.822 g/cm2 / - 3.0  0.798 g/cm2 / - 1.7  34.4% / 8.5%     12/06/24  [employer] Prodig[employer] unit   0.883 g/cm2  / - 2.5 (+ 7.4%)  0.800 g/cm2 / - 1.6 (+ 0.3%)  32.9% / 11.4%  Prolia     Assessment and Plan:  # Age-related osteoporosis  # Closed displaced fracture of lateral malleolus of left fibula   - noted significant improvement of BMD in lumbar spine while on Prolia  - will transition to Reclast now, continue to monitor resorption markers  Prior notes:  9/4/24  - diagnosed based on DEXA scan in 9/2022- lowest T-score = - 3.0 in a lumbar spine  - had 2 fragility fractures of L ankle x 2 with GLF  - identifiable risk factors: postoperative early menopause, FHx of osteoporosis,  race  - rule out secondary causes of osteoporosis: vit D deficiency, hyperparathyroidism, hyperthyroidism, hypophosphatemia, hypophosphatesia, CKD/liver disease  - prior therapy: Fosamax -> stopped due to severe joint pains -> Tymlos x 3 mo stopped due to cost issues -> Prolia x 1 on 6/22/24  - will continue Prolia infusions for at least 2  years then will discuss possible transition to Reclast  - obtain new DEXA scan   11/22/24  - discussed available anti-osteoporosis treatment options, risks and benefits  - I do not think reported symptoms?infection? Is related to Prolia, but ok to switch to Reclast  - will get new DEXA scan report, repeat CMP, vit D, obtain CTX/PN1P    Plan:  Ordered Reclast on 12/18/2024  Repeat CMP, CTX in 3 mo.   4.   Continue Ca and vit D supplementation.   5.   Fall precautions.    RTC: 3 mo    Total time (face-to-face and non-face-to face time):  30 min - extensive discussion of diagnoses, treatment, prognosis,  documentation.      Plan reviewed with the patient and agreed with plan.  All questions answered to patient's satisfaction.  Thank you kindly for allowing me to participate in the care plan for this patient.    Nel Patel MD    CC:   ELDER Chang

## 2024-12-18 ENCOUNTER — OUTPATIENT INFUSION SERVICES (OUTPATIENT)
Dept: ONCOLOGY | Facility: MEDICAL CENTER | Age: 69
End: 2024-12-18
Attending: STUDENT IN AN ORGANIZED HEALTH CARE EDUCATION/TRAINING PROGRAM
Payer: MEDICARE

## 2024-12-18 VITALS
HEART RATE: 71 BPM | RESPIRATION RATE: 18 BRPM | TEMPERATURE: 97 F | SYSTOLIC BLOOD PRESSURE: 116 MMHG | WEIGHT: 158.73 LBS | DIASTOLIC BLOOD PRESSURE: 57 MMHG | OXYGEN SATURATION: 97 % | HEIGHT: 66 IN | BODY MASS INDEX: 25.51 KG/M2

## 2024-12-18 DIAGNOSIS — M81.0 POSTMENOPAUSAL OSTEOPOROSIS: ICD-10-CM

## 2024-12-18 DIAGNOSIS — M81.0 AGE-RELATED OSTEOPOROSIS WITHOUT CURRENT PATHOLOGICAL FRACTURE: ICD-10-CM

## 2024-12-18 LAB
CA-I BLD ISE-SCNC: 1.2 MMOL/L (ref 1.1–1.3)
CREAT BLD-MCNC: 0.8 MG/DL (ref 0.5–1.4)

## 2024-12-18 PROCEDURE — 96365 THER/PROPH/DIAG IV INF INIT: CPT

## 2024-12-18 PROCEDURE — 82565 ASSAY OF CREATININE: CPT

## 2024-12-18 PROCEDURE — 700111 HCHG RX REV CODE 636 W/ 250 OVERRIDE (IP): Mod: JZ | Performed by: STUDENT IN AN ORGANIZED HEALTH CARE EDUCATION/TRAINING PROGRAM

## 2024-12-18 PROCEDURE — 82330 ASSAY OF CALCIUM: CPT

## 2024-12-18 PROCEDURE — 700102 HCHG RX REV CODE 250 W/ 637 OVERRIDE(OP): Performed by: STUDENT IN AN ORGANIZED HEALTH CARE EDUCATION/TRAINING PROGRAM

## 2024-12-18 PROCEDURE — A9270 NON-COVERED ITEM OR SERVICE: HCPCS | Performed by: STUDENT IN AN ORGANIZED HEALTH CARE EDUCATION/TRAINING PROGRAM

## 2024-12-18 RX ORDER — ACETAMINOPHEN 325 MG/1
650 TABLET ORAL ONCE
Status: COMPLETED | OUTPATIENT
Start: 2024-12-18 | End: 2024-12-18

## 2024-12-18 RX ORDER — 0.9 % SODIUM CHLORIDE 0.9 %
10 VIAL (ML) INJECTION PRN
OUTPATIENT
Start: 2025-12-19

## 2024-12-18 RX ORDER — METHYLPREDNISOLONE SODIUM SUCCINATE 125 MG/2ML
125 INJECTION, POWDER, LYOPHILIZED, FOR SOLUTION INTRAMUSCULAR; INTRAVENOUS PRN
OUTPATIENT
Start: 2024-12-21

## 2024-12-18 RX ORDER — 0.9 % SODIUM CHLORIDE 0.9 %
VIAL (ML) INJECTION PRN
OUTPATIENT
Start: 2025-12-19

## 2024-12-18 RX ORDER — METHYLPREDNISOLONE SODIUM SUCCINATE 125 MG/2ML
125 INJECTION, POWDER, LYOPHILIZED, FOR SOLUTION INTRAMUSCULAR; INTRAVENOUS PRN
OUTPATIENT
Start: 2025-12-19

## 2024-12-18 RX ORDER — SODIUM CHLORIDE 9 MG/ML
INJECTION, SOLUTION INTRAVENOUS CONTINUOUS
OUTPATIENT
Start: 2025-12-19

## 2024-12-18 RX ORDER — EPINEPHRINE 1 MG/ML(1)
0.5 AMPUL (ML) INJECTION PRN
OUTPATIENT
Start: 2024-12-21

## 2024-12-18 RX ORDER — ACETAMINOPHEN 325 MG/1
650 TABLET ORAL ONCE
OUTPATIENT
Start: 2025-12-19 | End: 2025-12-19

## 2024-12-18 RX ORDER — EPINEPHRINE 1 MG/ML(1)
0.5 AMPUL (ML) INJECTION PRN
OUTPATIENT
Start: 2025-12-19

## 2024-12-18 RX ORDER — DIPHENHYDRAMINE HYDROCHLORIDE 50 MG/ML
50 INJECTION INTRAMUSCULAR; INTRAVENOUS PRN
OUTPATIENT
Start: 2025-12-19

## 2024-12-18 RX ORDER — DIPHENHYDRAMINE HYDROCHLORIDE 50 MG/ML
50 INJECTION INTRAMUSCULAR; INTRAVENOUS PRN
OUTPATIENT
Start: 2024-12-21

## 2024-12-18 RX ORDER — SODIUM CHLORIDE 9 MG/ML
INJECTION, SOLUTION INTRAVENOUS CONTINUOUS
Status: DISCONTINUED | OUTPATIENT
Start: 2024-12-18 | End: 2024-12-18 | Stop reason: HOSPADM

## 2024-12-18 RX ORDER — ZOLEDRONIC ACID 0.05 MG/ML
5 INJECTION, SOLUTION INTRAVENOUS ONCE
OUTPATIENT
Start: 2025-12-19 | End: 2025-12-19

## 2024-12-18 RX ORDER — 0.9 % SODIUM CHLORIDE 0.9 %
3 VIAL (ML) INJECTION PRN
OUTPATIENT
Start: 2025-12-19

## 2024-12-18 RX ORDER — ZOLEDRONIC ACID 0.05 MG/ML
5 INJECTION, SOLUTION INTRAVENOUS ONCE
Status: COMPLETED | OUTPATIENT
Start: 2024-12-18 | End: 2024-12-18

## 2024-12-18 RX ADMIN — ACETAMINOPHEN 650 MG: 325 TABLET ORAL at 14:15

## 2024-12-18 RX ADMIN — ZOLEDRONIC ACID 5 MG: 5 INJECTION, SOLUTION INTRAVENOUS at 14:24

## 2024-12-18 ASSESSMENT — FIBROSIS 4 INDEX: FIB4 SCORE: 1.3

## 2024-12-18 NOTE — PROGRESS NOTES
[facility] for zoledronic acid infusion.  Pt denies oral surgery past 4 weeks and none planned within next 4 weeks. Pt verbalized taking calcium and vitamin D as recommended by her provider.  PIV to right AC established, + blood return, lab drawn. Labs reviewed, pt ok to receive zoledronic infusion today.  Printout of zoledronic acid (Reclast) given to Allyn.   Zoledronic acid infused over 30 minutes. Pt tolerated without any adverse events noted.  PIV with + blood return post infusion, flushed and d/c'd with tip intact.   Allyn off unit in NAD. Will follow up with her provider for future appointments.

## 2025-01-26 NOTE — PROGRESS NOTES
Chief Complaint: Follow up for the following conditions   Subjective:    Allyn Rosenberg is a 68 y.o. female     Postmenopausal Osteoporosis:   Pt was losing bone to her teeth and evaluation for Osteoporosis was done   Patient admits to history of fracture a few years ago due to twisting ankle while she was hinking L .      The cause of osteoporosis is felt to be due to postmenopausal estrogen deficiency and dietary calcium and/or vitamin D deficiency.   She is currently being treated with calcium-1800 mg  and vitamin D3-25431 weekly supplementation-bone streignths .   She is not currently being treated with bisphosphonate      Tried and failed Fosamax - SE of severe joint pain         Recurrent falls: no  Physical activity: walking with granddaughter      Latest Reference Range & Units 02/18/23 09:18   GFR (CKD-EPI) >60 mL/min/1.73 m 2 90      Latest Reference Range & Units 02/18/23 09:18   C. Telopeptide B Cross Linked pg/mL 487      Latest Reference Range & Units 02/18/23 09:18   Pth, Intact 14.0 - 72.0 pg/mL 71.7     SPEP W/REFLEX TO JAMES BRAVO G, M  Order: [id number]  Status: Final result     Visible to patient: Yes (seen)     Next appt: Today at 10:10 AM in Endocrinology (Jai Rudolph, A.P.R.N.)     Dx: Post-menopausal osteoporosis     0 Result Notes       Component Ref Range & Units 3 mo ago   Albumin 3.75 - 5.01 g/dL 4.12    Alpha-1 Globulin 0.19 - 0.46 g/dL 0.37    Alpha-2 Globulin 0.48 - 1.05 g/dL 0.91    Beta Globulin 0.48 - 1.10 g/dL 1.01    Gamma Globulin 0.62 - 1.51 g/dL 0.90    Monoclonal Protein g/dL Not Applicable    Interpretation  See Note    Comment: Normal SPEP pattern.        Miscellaneous Lab Result  REFERENCE [email or web address]  Collected: 02/18/23 0919   Result status: Final   Resulting lab: [facility]   Value: SEE NOTE   Comment: Test name                     Result Flag Units  RefIntvl   -------------------------------------------------------------   Osteocalcin by ECIA               14       ng/mL 8-36      2. Overweight:  Up to 25lbs for the past 2 years  Despite diet and exercise     3. Vitamin D defienciency:   Currently taking 13980 weekly    Latest Reference Range & Units 02/18/23 09:18   25-Hydroxy   Vitamin D 25 30 - 100 ng/mL 31     4.  Screening for thyroid disorder:  Reports weight gain for the past 3 years   Latest Reference Range & Units 02/18/23 09:18   TSH 0.380 - 5.330 uIU/mL 2.930   Free T-4 0.93 - 1.70 ng/dL 1.16     5.  Family history of diabetes mellitus:  Reports first-degree family history of diabetes     Latest Reference Range & Units 02/18/23 09:18   Glycohemoglobin 4.0 - 5.6 % 5.6   Estim. Avg Glu mg/dL 114     Patient's medications, allergies, and social histories were reviewed and updated as appropriate.  ROS:     CONS:     No fever, no chills, no weight loss, no fatigue   EYES:      No diplopia, no blurry vision, no redness of eyes, no swelling of eyelids   ENT:    No hearing loss, No ear pain, No sore throat, no dysphagia, no neck swelling   CV:     No chest pain, no palpitations, no claudication, no orthopnea, no PND   PULM:    No SOB, no cough, no hemoptysis, no wheezing    GI:   No nausea, no vomiting, no diarrhea, no constipation, no bloody stools   :  Passing urine well, no dysuria, no hematuria   ENDO:   No polyuria, no polydipsia, no heat intolerance, no cold intolerance   NEURO: No headaches, no dizziness, no convulsions, no tremors   MUSC:  No joint swellings, no arthralgias, no myalgias, no weakness   SKIN:   No rash, no ulcers, no dry skin   PSYCH:   No depression, no anxiety, no difficulty sleeping       Past Medical History:  Patient Active Problem List    Diagnosis Date Noted    Hiatal hernia 03/28/2023    Schatzki's ring 03/28/2023    H/O cold sores 03/08/2023    Primary insomnia 12/07/2022    Gastroesophageal reflux disease without esophagitis 12/07/2022    Right hip pain 12/07/2022    Left foot pain 12/07/2022    Greater trochanteric bursitis of right hip 12/07/2022     Age-related incipient cataract of both eyes 12/07/2022    Age-related osteoporosis without current pathological fracture 10/12/2022    Low vitamin D level 05/04/2022    Mixed stress and urge urinary incontinence 05/03/2022    Genetic screening 05/03/2022    History of smoking 05/03/2022    Falls 09/20/2021    MDD (major depressive disorder), recurrent, in full remission (HCC) 06/15/2021    Diverticulitis 06/15/2021    Urinary incontinence 02/03/2021    Dyslipidemia 01/29/2021    Acquired hypothyroidism 01/29/2021       Past Surgical History:  Past Surgical History:   Procedure Laterality Date    ABDOMINAL HYSTERECTOMY TOTAL      BRCA1&2 GEN FULL SEQ DUP/DEL      HYSTERECTOMY, TOTAL ABDOMINAL Bilateral     PRIMARY C SECTION      TONSILLECTOMY AND ADENOIDECTOMY Bilateral     TUBAL COAGULATION LAPAROSCOPIC BILATERAL          Allergies:  Erythromycin and Other drug     Current Medications:    Current Outpatient Medications:     valacyclovir (VALTREX) 1 GM Tab, Take 2 Tablets by mouth every 12 hours as needed (AT THE FIRST SIGN OF A COLD SORE FOR 2 DOSES THEN STOP.) for up to 40 doses., Disp: 20 Tablet, Rfl: 3    escitalopram (LEXAPRO) 10 MG Tab, Take 1 Tablet by mouth every day., Disp: 90 Tablet, Rfl: 1    buPROPion (WELLBUTRIN XL) 150 MG XL tablet, Take 1 Tablet by mouth every morning., Disp: 90 Tablet, Rfl: 3    thyroid (ARMOUR THYROID) 15 MG Tab, Take 1 Tablet by mouth every 48 hours., Disp: 45 Tablet, Rfl: 3    Specialty Vitamins Products (ONE-A-DAY BONE STRENGTH PO), Take 3 Tablets by mouth every day., Disp: , Rfl:     famotidine (PEPCID) 40 MG Tab, Take 40 mg by mouth every day., Disp: , Rfl:     Cholecalciferol (VITAMIN D PO), Take 50,000 Units by mouth every 7 days., Disp: , Rfl:     Social History:  Social History     Socioeconomic History    Marital status:      Spouse name: Not on file    Number of children: Not on file    Years of education: Not on file    Highest education level: Not on file  "  Occupational History    Not on file   Tobacco Use    Smoking status: Former     Packs/day: 0.50     Years: 10.00     Pack years: 5.00     Types: Cigarettes    Smokeless tobacco: Never   Vaping Use    Vaping Use: Never used   Substance and Sexual Activity    Alcohol use: Yes     Alcohol/week: 1.8 oz     Types: 3 Glasses of wine per week    Drug use: No    Sexual activity: Not Currently   Other Topics Concern    Not on file   Social History Narrative    Not on file     Social Determinants of Health     Financial Resource Strain: Not on file   Food Insecurity: Not on file   Transportation Needs: Not on file   Physical Activity: Not on file   Stress: Not on file   Social Connections: Not on file   Intimate Partner Violence: Not on file   Housing Stability: Not on file        Family History:   Family History   Problem Relation Age of Onset    Hypertension Father     Lung Disease Father     Other Father     Heart Disease Neg Hx          PHYSICAL EXAM:   Vital signs: /60 (BP Location: Left arm, Patient Position: Sitting)   Pulse 84   Ht 1.651 m (5' 5\")   Wt 71.4 kg (157 lb 6.4 oz)   SpO2 99%   BMI 26.19 kg/m²   GENERAL: Well-developed, well-nourished  in no apparent distress.   EYE: No ocular and eyelid asymmetry, Anicteric sclerae,  PERRL, No exophthalmos or lidlag  SKIN: No rashes, lesions. Turgor is normal.    Labs:  Lab Results   Component Value Date/Time    TSHULTRASEN 2.780 02/03/2021 1206     Lab Results   Component Value Date/Time    FREET4 0.94 08/24/2016 1236       Imaging:      ASSESSMENT/PLAN:   1. Postmenopausal osteoporosis  Unstable based on last bone density scan  Extensive discussion about osteoporosis, rationale for treating osteoporosis  Based on last bone density scan patient is a candidate for an anabolic agent, her parathyroid hormone is not elevated  We discussed Tymlos and Forteo, how and when to take it, I demonstrated back with dummy sample today-Pt is agreeable to start treatment " with Tymlos, side effects discussed  All blood work reviewed with patient today  Daily calcium recommendation discussed: Clinically stable stable, 600-1200 mg of daily calcium  Vitamin D supplementation discussed  Weightbearing exercises discussed    - PTH INTACT (PTH ONLY); Future  - Comp Metabolic Panel; Future  - PHOSPHORUS; Future  - FREE THYROXINE; Future    2. Overweight (BMI 25.0-29.9)  Unstable  Lifestyle modifications discussed    3. Vitamin D deficiency  Unstable  Continue regimen-HPI  - VITAMIN D,25 HYDROXY (DEFICIENCY); Future    4. Screening for thyroid disorder  Resolved    5. Family history of diabetes mellitus  Resolved    Disposition: Make an appointment to follow-up in 3 months  Please do blood work 2 weeks prior to next appointment    Thank you kindly for allowing me to participate in the endocrine care plan for this patient.    ELDER Hyatt  06/12/23      CC:   ELDER Chang   DISPLAY PLAN FREE TEXT

## 2025-02-07 PROBLEM — Z80.3 FAMILY HISTORY OF BREAST CANCER: Status: ACTIVE | Noted: 2025-02-07

## 2025-02-07 PROBLEM — R73.01 IMPAIRED FASTING GLUCOSE: Status: ACTIVE | Noted: 2025-02-07

## 2025-02-07 PROBLEM — F41.1 GAD (GENERALIZED ANXIETY DISORDER): Chronic | Status: ACTIVE | Noted: 2025-02-07

## 2025-02-07 PROBLEM — F41.1 GAD (GENERALIZED ANXIETY DISORDER): Status: ACTIVE | Noted: 2025-02-07

## 2025-02-17 ENCOUNTER — PATIENT MESSAGE (OUTPATIENT)
Dept: HEALTH INFORMATION MANAGEMENT | Facility: OTHER | Age: 70
End: 2025-02-17

## 2025-02-28 ENCOUNTER — TELEPHONE (OUTPATIENT)
Dept: ENDOCRINOLOGY | Facility: MEDICAL CENTER | Age: 70
End: 2025-02-28
Payer: MEDICARE

## 2025-02-28 NOTE — TELEPHONE ENCOUNTER
Called Pt and reminded them of their appt on 03/14/25 and let them know they have labs due prior to their appt  Pt said they would get their labs taken care of prior to their appt

## 2025-03-09 NOTE — PROGRESS NOTES
"Follow up Endocrinology Visit  Last visit  with Jai Rudolph on: 1/26/24  Last visit with me on: 12/13/24  Referred by: MALLORY Chang.    Patient was presented for a telehealth consultation via secure and encrypted videoconferencing technology.    Location of Provider - office  Location of Patient - [facility]  Patient Consent - yes  Platform used - Zoom  Total time - 30 min    Chief complaint:  Allyn Rosenberg, 69 y.o., female, who is here for follow up for severe osteoporosis management.     Interval history:   - doing well  - tolerated Reclast infusion on 12/18/25 well, developed anxiety which was addressed by her PCP  - she didn't do labs before the appointment  Prior notes:  9/4/24  - since last visit no significant events  - reviewed recent labs  11/22/24  - patient reports being sick recently, having more cough, some neck discomfort, recently diagnosed infection? Which she correlates with Prolia and would like to stop the medication  - no new labs or DEXA scan to review  12/13/24  - now recovering from COVID  -  planning to go to [place] to see her daughter who has a breast cancer  - reviewed most recent labs and DEXA scan    Osteoporosis:  - diagnosed based on DEXA scan, lowest T-score = - 3.0 in lumbar spine was noted in 9/2022  - history of low impact fractures: L ankle fracture x 2 (10 - 12 years ago + 8/2023 - fell in the bathroom)  - change in height: denies  - negative history of falls  - \"not having a great balance\" after head trauma in the past  -  previous treatment: Fosamax -> joint pain, Tymlos - 3 mo but got very expensive -> get 1 shot of Prolia on 6/22/24, tolerated well  - daily Ca intake - 1.2 g  - daily vit D intake - ?  - exercise: walking exercise in the morning, cardio training  RISK FACTORS:  - history of hysterectectomy or oophorectomy - in 30s -> HRT x 6 years  - denies history of Ca, phosphorus, vit D, thyroid/parathryoid disorders, kidney stones, CKD, liver disease, " "chronic malabsorption/diarrhea, immobilization, smoking, alcohol abuse, chronic steroid use  - Fhx osteoporosis - sister  Factors affecting osteoporosis treatment choice:  History of prior radiation - denies  GERD/dysphagia - yes  Hx of teeth problems, upcoming dental work  - plans for tooth implant  Hx CKD (baseline creatinine) - denies  Hx of CVA or MI - denies    Medications:  Current Outpatient Medications:     escitalopram (LEXAPRO) 20 MG tablet, Take 1 Tablet by mouth every day. FOR ANXIETY AND DEPRESSION, Disp: 90 Tablet, Rfl: 3    zoledronic Acid (RECLAST) 5 MG/100ML Solution IVPB premix, Infuse 5 mg into a venous catheter every 6 months., Disp: , Rfl:     propranolol (INDERAL) 10 MG Tab, Take 1 Tablet by mouth 1 time a day as needed (ANXIETY)., Disp: 60 Tablet, Rfl: 3    Multiple Vitamins-Minerals (CENTRUM SILVER ADULT 50+) Tab, Take 1 Tablet by mouth every morning., Disp: , Rfl:     valacyclovir (VALTREX) 1 GM Tab, Take 2 Tablets by mouth every 12 hours as needed (AT THE FIRST SIGN OF A COLD SORE FOR 2 DOSES THEN STOP.) for up to 40 doses., Disp: 20 Tablet, Rfl: 3    famotidine (PEPCID) 40 MG Tab, Take 1 Tablet by mouth every day. (Patient taking differently: Take 40 mg by mouth 1 time a day as needed (Heartburn).), Disp: 90 Tablet, Rfl: 3    buPROPion (WELLBUTRIN XL) 150 MG XL tablet, TAKE 1 TABLET BY MOUTH EVERY DAY IN THE MORNING, Disp: 90 Tablet, Rfl: 3    Specialty Vitamins Products (ONE-A-DAY BONE STRENGTH PO), Take 1 Tablet by mouth every morning., Disp: , Rfl:     Physical Examination:   Vital signs:Ht 1.664 m (5' 5.5\")   Wt 71.2 kg (157 lb)   BMI 25.73 kg/m²   General: No distress, cooperative, well dressed and well nourished. No cushingoid features.   Resp: Normal effort  Neuro: Alert and oriented  Psych: Normal mood and affect    Labs:  - reviewed  Osteoporosis work up:   Reference Range  02/18/23 08/30/23 04/30/24 07/06/24 12/04/24    Hb 12.0 - 16.0 g/dL   13.4 14.4     Hct 37.0 - 47.0 %   " 39.9 42.4     Sodium 135 - 145 mmol/L 140 137 140 137 135    Creatinine 0.50 - 1.40 mg/dL 0.72 0.83 0.82 0.59 0.77    GFR (CKD-EPI) >60 mL/min/1.73 m 2 90 76 77 97 83    Calcium 8.5 - 10.5 mg/dL 9.8 9.9 9.1 9.5 9.5    Correct Calcium 8.5 - 10.5 mg/dL 9.4 9.5 8.9 9.0 9.0    AST(SGOT) 12 - 45 U/L 15 16 16 18 31    ALT(SGPT) 2 - 50 U/L 17 17 17 26 38    ALP 30 - 99 U/L 109 (H) 98 86 124 (H) 93    Phosphorus 2.5 - 4.5 mg/dL 3.7 3.6   3.4    Vit D 30 - 100 ng/mL 31 48 32      CTX pg/mL 487  375  83    TSH 0.380 - 5.330 uIU/mL 2.930        Pth, Intact 14.0 - 72.0 pg/mL 71.7 52.7   65.7      SPEP:   Reference Range 02/18/23   Gamma Globulin 0.62 - 1.51 g/dL 0.90   Alpha-1 Globulin 0.19 - 0.46 g/dL 0.37   Alpha-2 Globulin 0.48 - 1.05 g/dL 0.91   Beta Globulin 0.48 - 1.10 g/dL 1.01     Imaging:  - reviewed  DEXA scan:  Date Machine L1- L4  BMD/Z-score/ T-score LFN  BMD/ Z-score/ T-score FRAX Rx    9/28/2022    GE Prodigy unit   0.822 g/cm2 / - 3.0  0.798 g/cm2 / - 1.7  34.4% / 8.5%     12/06/24  GE Prodigy unit   0.883 g/cm2  / - 2.5 (+ 7.4%)  0.800 g/cm2 / - 1.6 (+ 0.3%)  32.9% / 11.4%  Prolia     Assessment and Plan:  # Age-related osteoporosis, s/p Prolia x 1(6/2024), Reclast x 1(12/2025)  # Closed displaced fracture of lateral malleolus of left fibula   - received Reclast on 12/18/25, tolerated well, anxiety is unlikely to be side effect of the medication  Prior notes:  9/4/24  - diagnosed based on DEXA scan in 9/2022- lowest T-score = - 3.0 in a lumbar spine  - had 2 fragility fractures of L ankle x 2 with GLF  - identifiable risk factors: postoperative early menopause, FHx of osteoporosis,  race  - rule out secondary causes of osteoporosis: vit D deficiency, hyperparathyroidism, hyperthyroidism, hypophosphatemia, hypophosphatesia, CKD/liver disease  - prior therapy: Fosamax -> stopped due to severe joint pains -> Tymlos x 3 mo stopped due to cost issues -> Prolia x 1 on 6/22/24  - will continue Prolia  infusions for at least 2 years then will discuss possible transition to Reclast  - obtain new DEXA scan   11/22/24  - discussed available anti-osteoporosis treatment options, risks and benefits  - I do not think reported symptoms?infection?is related to Prolia, but ok to switch to Reclast as patient request  - will get new DEXA scan report, repeat CMP, vit D, obtain CTX/PN1P  12/13/24  - noted significant improvement of BMD in lumbar spine while on Prolia  - will transition to Reclast now, continue to monitor resorption markers    Plan:  Ordered Reclast on 12/18/2025  Repeat CMP, CTX now and in 3 mo.   4.   Continue Ca and vit D supplementation.   5.   Fall precautions.    RTC: 12 mo    Total time (face-to-face and non-face-to face time):  30 min     Plan reviewed with the patient and agreed with plan.  All questions answered to patient's satisfaction.  Thank you kindly for allowing me to participate in the care plan for this patient.    Nel Patel MD    CC:   ELDER Chang

## 2025-03-14 ENCOUNTER — TELEMEDICINE (OUTPATIENT)
Dept: ENDOCRINOLOGY | Facility: MEDICAL CENTER | Age: 70
End: 2025-03-14
Attending: STUDENT IN AN ORGANIZED HEALTH CARE EDUCATION/TRAINING PROGRAM
Payer: MEDICARE

## 2025-03-14 VITALS — WEIGHT: 157 LBS | BODY MASS INDEX: 25.23 KG/M2 | HEIGHT: 66 IN

## 2025-03-14 DIAGNOSIS — M81.0 POSTMENOPAUSAL OSTEOPOROSIS: ICD-10-CM

## 2025-03-14 RX ORDER — EPINEPHRINE 1 MG/ML(1)
0.5 AMPUL (ML) INJECTION PRN
OUTPATIENT
Start: 2025-12-18

## 2025-03-14 RX ORDER — 0.9 % SODIUM CHLORIDE 0.9 %
3 VIAL (ML) INJECTION PRN
OUTPATIENT
Start: 2025-12-18

## 2025-03-14 RX ORDER — SODIUM CHLORIDE 9 MG/ML
INJECTION, SOLUTION INTRAVENOUS CONTINUOUS
OUTPATIENT
Start: 2025-12-18

## 2025-03-14 RX ORDER — METHYLPREDNISOLONE SODIUM SUCCINATE 125 MG/2ML
125 INJECTION, POWDER, LYOPHILIZED, FOR SOLUTION INTRAMUSCULAR; INTRAVENOUS PRN
OUTPATIENT
Start: 2025-12-18

## 2025-03-14 RX ORDER — 0.9 % SODIUM CHLORIDE 0.9 %
10 VIAL (ML) INJECTION PRN
OUTPATIENT
Start: 2025-12-18

## 2025-03-14 RX ORDER — 0.9 % SODIUM CHLORIDE 0.9 %
VIAL (ML) INJECTION PRN
OUTPATIENT
Start: 2025-12-18

## 2025-03-14 RX ORDER — ZOLEDRONIC ACID 0.05 MG/ML
5 INJECTION, SOLUTION INTRAVENOUS ONCE
OUTPATIENT
Start: 2025-12-18 | End: 2025-12-18

## 2025-03-14 RX ORDER — ACETAMINOPHEN 325 MG/1
650 TABLET ORAL ONCE
OUTPATIENT
Start: 2025-12-18 | End: 2025-12-18

## 2025-03-14 RX ORDER — DIPHENHYDRAMINE HYDROCHLORIDE 50 MG/ML
50 INJECTION, SOLUTION INTRAMUSCULAR; INTRAVENOUS PRN
OUTPATIENT
Start: 2025-12-18

## 2025-03-14 ASSESSMENT — FIBROSIS 4 INDEX: FIB4 SCORE: 1.32

## 2025-03-19 ENCOUNTER — HOSPITAL ENCOUNTER (OUTPATIENT)
Facility: MEDICAL CENTER | Age: 70
End: 2025-03-19
Attending: STUDENT IN AN ORGANIZED HEALTH CARE EDUCATION/TRAINING PROGRAM
Payer: MEDICARE

## 2025-03-19 DIAGNOSIS — M81.0 POSTMENOPAUSAL OSTEOPOROSIS: ICD-10-CM

## 2025-03-19 LAB
25(OH)D3 SERPL-MCNC: 36 NG/ML (ref 30–100)
ALBUMIN SERPL BCP-MCNC: 4.3 G/DL (ref 3.2–4.9)
ALBUMIN/GLOB SERPL: 1.5 G/DL
ALP SERPL-CCNC: 80 U/L (ref 30–99)
ALT SERPL-CCNC: 29 U/L (ref 2–50)
ANION GAP SERPL CALC-SCNC: 12 MMOL/L (ref 7–16)
AST SERPL-CCNC: 22 U/L (ref 12–45)
BILIRUB SERPL-MCNC: 0.3 MG/DL (ref 0.1–1.5)
BUN SERPL-MCNC: 12 MG/DL (ref 8–22)
CALCIUM ALBUM COR SERPL-MCNC: 9.6 MG/DL (ref 8.5–10.5)
CALCIUM SERPL-MCNC: 9.8 MG/DL (ref 8.4–10.2)
CHLORIDE SERPL-SCNC: 103 MMOL/L (ref 96–112)
CO2 SERPL-SCNC: 23 MMOL/L (ref 20–33)
CREAT SERPL-MCNC: 0.81 MG/DL (ref 0.5–1.4)
GFR SERPLBLD CREATININE-BSD FMLA CKD-EPI: 78 ML/MIN/1.73 M 2
GLOBULIN SER CALC-MCNC: 2.8 G/DL (ref 1.9–3.5)
GLUCOSE SERPL-MCNC: 92 MG/DL (ref 65–99)
POTASSIUM SERPL-SCNC: 4.6 MMOL/L (ref 3.6–5.5)
PROT SERPL-MCNC: 7.1 G/DL (ref 6–8.2)
SODIUM SERPL-SCNC: 138 MMOL/L (ref 135–145)

## 2025-03-19 PROCEDURE — 82523 COLLAGEN CROSSLINKS: CPT

## 2025-03-19 PROCEDURE — 82306 VITAMIN D 25 HYDROXY: CPT

## 2025-03-19 PROCEDURE — 36415 COLL VENOUS BLD VENIPUNCTURE: CPT

## 2025-03-19 PROCEDURE — 80053 COMPREHEN METABOLIC PANEL: CPT

## 2025-03-21 LAB — COLLAGEN CTX SERPL-MCNC: 97 PG/ML

## 2025-04-29 NOTE — ASSESSMENT & PLAN NOTE
Chronic condition, stable.  Patient takes [place] Thyroid 15 mg daily.  Her last TSH was 2.78 in February 2021.  She feels well on her medication.  She denies hair loss, skin changes or brittle nails.  Updated labs were ordered today.  She will continue her current regimen.   Harper Jason  [address] [facility] [place] [address]        4/29/2025    Harper Jason,    I have reviewed the pathology from your recent colonoscopy, and the report confirmed these polyps to be adenomas. The polyps that were removed DID NOT contain any cancer, however this is the type of tissue that can develop into a cancer over time if not removed. Given these findings, I suggest you have a repeat colonoscopy in 3 years as we discussed at the time of your last procedure. Please cezar your calendar and call our office 4-6 weeks prior to that date to schedule your office appointment.     My office will send a reminder, however it is your responsibility to arrange this appointment and to notify our office of any address and telephone number changes to help us better assist in your medical care.       Sincerely,        Wyatt Morrow MD  April 29, 2025 14:40 CDT

## 2025-05-03 ENCOUNTER — HOSPITAL ENCOUNTER (OUTPATIENT)
Dept: LAB | Facility: MEDICAL CENTER | Age: 70
End: 2025-05-03
Payer: MEDICARE

## 2025-05-03 DIAGNOSIS — R79.89 LOW VITAMIN D LEVEL: Chronic | ICD-10-CM

## 2025-05-03 DIAGNOSIS — E78.5 DYSLIPIDEMIA: Chronic | ICD-10-CM

## 2025-05-03 DIAGNOSIS — R73.01 IMPAIRED FASTING GLUCOSE: ICD-10-CM

## 2025-05-03 LAB
25(OH)D3 SERPL-MCNC: 41 NG/ML (ref 30–100)
ALBUMIN SERPL BCP-MCNC: 4.1 G/DL (ref 3.2–4.9)
ALBUMIN/GLOB SERPL: 1.4 G/DL
ALP SERPL-CCNC: 75 U/L (ref 30–99)
ALT SERPL-CCNC: 26 U/L (ref 2–50)
ANION GAP SERPL CALC-SCNC: 9 MMOL/L (ref 7–16)
AST SERPL-CCNC: 21 U/L (ref 12–45)
BASOPHILS # BLD AUTO: 0.7 % (ref 0–1.8)
BASOPHILS # BLD: 0.05 K/UL (ref 0–0.12)
BILIRUB SERPL-MCNC: 0.5 MG/DL (ref 0.1–1.5)
BUN SERPL-MCNC: 12 MG/DL (ref 8–22)
CALCIUM ALBUM COR SERPL-MCNC: 9.3 MG/DL (ref 8.5–10.5)
CALCIUM SERPL-MCNC: 9.4 MG/DL (ref 8.5–10.5)
CHLORIDE SERPL-SCNC: 105 MMOL/L (ref 96–112)
CHOLEST SERPL-MCNC: 249 MG/DL (ref 100–199)
CO2 SERPL-SCNC: 26 MMOL/L (ref 20–33)
CREAT SERPL-MCNC: 0.87 MG/DL (ref 0.5–1.4)
CREAT UR-MCNC: 199 MG/DL
EOSINOPHIL # BLD AUTO: 0.51 K/UL (ref 0–0.51)
EOSINOPHIL NFR BLD: 6.7 % (ref 0–6.9)
ERYTHROCYTE [DISTWIDTH] IN BLOOD BY AUTOMATED COUNT: 46.2 FL (ref 35.9–50)
EST. AVERAGE GLUCOSE BLD GHB EST-MCNC: 114 MG/DL
GFR SERPLBLD CREATININE-BSD FMLA CKD-EPI: 71 ML/MIN/1.73 M 2
GLOBULIN SER CALC-MCNC: 2.9 G/DL (ref 1.9–3.5)
GLUCOSE SERPL-MCNC: 96 MG/DL (ref 65–99)
HBA1C MFR BLD: 5.6 % (ref 4–5.6)
HCT VFR BLD AUTO: 41.8 % (ref 37–47)
HDLC SERPL-MCNC: 52 MG/DL
HGB BLD-MCNC: 13.4 G/DL (ref 12–16)
IMM GRANULOCYTES # BLD AUTO: 0.01 K/UL (ref 0–0.11)
IMM GRANULOCYTES NFR BLD AUTO: 0.1 % (ref 0–0.9)
LDLC SERPL CALC-MCNC: 150 MG/DL
LYMPHOCYTES # BLD AUTO: 3 K/UL (ref 1–4.8)
LYMPHOCYTES NFR BLD: 39.2 % (ref 22–41)
MCH RBC QN AUTO: 31.2 PG (ref 27–33)
MCHC RBC AUTO-ENTMCNC: 32.1 G/DL (ref 32.2–35.5)
MCV RBC AUTO: 97.4 FL (ref 81.4–97.8)
MICROALBUMIN UR-MCNC: <1.2 MG/DL
MICROALBUMIN/CREAT UR: NORMAL MG/G (ref 0–30)
MONOCYTES # BLD AUTO: 0.8 K/UL (ref 0–0.85)
MONOCYTES NFR BLD AUTO: 10.5 % (ref 0–13.4)
NEUTROPHILS # BLD AUTO: 3.28 K/UL (ref 1.82–7.42)
NEUTROPHILS NFR BLD: 42.8 % (ref 44–72)
NRBC # BLD AUTO: 0 K/UL
NRBC BLD-RTO: 0 /100 WBC (ref 0–0.2)
PLATELET # BLD AUTO: 299 K/UL (ref 164–446)
PMV BLD AUTO: 10.7 FL (ref 9–12.9)
POTASSIUM SERPL-SCNC: 4.1 MMOL/L (ref 3.6–5.5)
PROT SERPL-MCNC: 7 G/DL (ref 6–8.2)
RBC # BLD AUTO: 4.29 M/UL (ref 4.2–5.4)
SODIUM SERPL-SCNC: 140 MMOL/L (ref 135–145)
TRIGL SERPL-MCNC: 236 MG/DL (ref 0–149)
WBC # BLD AUTO: 7.7 K/UL (ref 4.8–10.8)

## 2025-05-03 PROCEDURE — 36415 COLL VENOUS BLD VENIPUNCTURE: CPT

## 2025-05-03 PROCEDURE — 83036 HEMOGLOBIN GLYCOSYLATED A1C: CPT

## 2025-05-03 PROCEDURE — 82043 UR ALBUMIN QUANTITATIVE: CPT

## 2025-05-03 PROCEDURE — 80061 LIPID PANEL: CPT

## 2025-05-03 PROCEDURE — 82306 VITAMIN D 25 HYDROXY: CPT

## 2025-05-03 PROCEDURE — 85025 COMPLETE CBC W/AUTO DIFF WBC: CPT

## 2025-05-03 PROCEDURE — 82570 ASSAY OF URINE CREATININE: CPT

## 2025-05-03 PROCEDURE — 80053 COMPREHEN METABOLIC PANEL: CPT

## 2025-05-10 PROBLEM — Z80.3 FAMILY HISTORY OF BREAST CANCER IN FIRST DEGREE RELATIVE: Chronic | Status: ACTIVE | Noted: 2025-02-07

## 2025-05-10 PROBLEM — M65.351 TRIGGER LITTLE FINGER OF RIGHT HAND: Status: ACTIVE | Noted: 2025-05-10

## 2025-05-14 ENCOUNTER — HOSPITAL ENCOUNTER (OUTPATIENT)
Facility: MEDICAL CENTER | Age: 70
End: 2025-05-14
Attending: OBSTETRICS & GYNECOLOGY
Payer: MEDICARE

## 2025-05-14 PROCEDURE — 87186 SC STD MICRODIL/AGAR DIL: CPT

## 2025-05-14 PROCEDURE — 87077 CULTURE AEROBIC IDENTIFY: CPT

## 2025-05-14 PROCEDURE — 87086 URINE CULTURE/COLONY COUNT: CPT

## 2025-05-16 LAB
BACTERIA UR CULT: ABNORMAL
BACTERIA UR CULT: ABNORMAL
SIGNIFICANT IND 70042: ABNORMAL
SITE SITE: ABNORMAL
SOURCE SOURCE: ABNORMAL

## 2025-06-21 ENCOUNTER — APPOINTMENT (OUTPATIENT)
Dept: RADIOLOGY | Facility: MEDICAL CENTER | Age: 70
DRG: 392 | End: 2025-06-21
Attending: EMERGENCY MEDICINE
Payer: MEDICARE

## 2025-06-21 ENCOUNTER — HOSPITAL ENCOUNTER (INPATIENT)
Facility: MEDICAL CENTER | Age: 70
LOS: 2 days | DRG: 392 | End: 2025-06-23
Attending: EMERGENCY MEDICINE | Admitting: HOSPITALIST
Payer: MEDICARE

## 2025-06-21 DIAGNOSIS — R10.30 LOWER ABDOMINAL PAIN: Primary | ICD-10-CM

## 2025-06-21 DIAGNOSIS — R11.0 NAUSEA: ICD-10-CM

## 2025-06-21 DIAGNOSIS — K52.9 COLITIS: ICD-10-CM

## 2025-06-21 DIAGNOSIS — K92.1 BLOODY STOOLS: ICD-10-CM

## 2025-06-21 LAB
ALBUMIN SERPL BCP-MCNC: 4 G/DL (ref 3.2–4.9)
ALBUMIN/GLOB SERPL: 1.4 G/DL
ALP SERPL-CCNC: 67 U/L (ref 30–99)
ALT SERPL-CCNC: 19 U/L (ref 2–50)
ANION GAP SERPL CALC-SCNC: 14 MMOL/L (ref 7–16)
APPEARANCE UR: CLEAR
AST SERPL-CCNC: 18 U/L (ref 12–45)
BASOPHILS # BLD AUTO: 0.3 % (ref 0–1.8)
BASOPHILS # BLD: 0.04 K/UL (ref 0–0.12)
BILIRUB SERPL-MCNC: 0.3 MG/DL (ref 0.1–1.5)
BILIRUB UR QL STRIP.AUTO: NEGATIVE
BUN SERPL-MCNC: 20 MG/DL (ref 8–22)
C DIFF TOX GENS STL QL NAA+PROBE: NEGATIVE
CALCIUM ALBUM COR SERPL-MCNC: 9.4 MG/DL (ref 8.5–10.5)
CALCIUM SERPL-MCNC: 9.4 MG/DL (ref 8.5–10.5)
CHLORIDE SERPL-SCNC: 106 MMOL/L (ref 96–112)
CO2 SERPL-SCNC: 18 MMOL/L (ref 20–33)
COLOR UR: YELLOW
CREAT SERPL-MCNC: 0.72 MG/DL (ref 0.5–1.4)
EOSINOPHIL # BLD AUTO: 0.06 K/UL (ref 0–0.51)
EOSINOPHIL NFR BLD: 0.4 % (ref 0–6.9)
ERYTHROCYTE [DISTWIDTH] IN BLOOD BY AUTOMATED COUNT: 45.5 FL (ref 35.9–50)
GFR SERPLBLD CREATININE-BSD FMLA CKD-EPI: 89 ML/MIN/1.73 M 2
GLOBULIN SER CALC-MCNC: 2.8 G/DL (ref 1.9–3.5)
GLUCOSE SERPL-MCNC: 114 MG/DL (ref 65–99)
GLUCOSE UR STRIP.AUTO-MCNC: NEGATIVE MG/DL
HCT VFR BLD AUTO: 40.1 % (ref 37–47)
HGB BLD-MCNC: 13.2 G/DL (ref 12–16)
IMM GRANULOCYTES # BLD AUTO: 0.09 K/UL (ref 0–0.11)
IMM GRANULOCYTES NFR BLD AUTO: 0.6 % (ref 0–0.9)
KETONES UR STRIP.AUTO-MCNC: NEGATIVE MG/DL
LEUKOCYTE ESTERASE UR QL STRIP.AUTO: NEGATIVE
LIPASE SERPL-CCNC: 46 U/L (ref 11–82)
LYMPHOCYTES # BLD AUTO: 1.49 K/UL (ref 1–4.8)
LYMPHOCYTES NFR BLD: 9.5 % (ref 22–41)
MCH RBC QN AUTO: 31.1 PG (ref 27–33)
MCHC RBC AUTO-ENTMCNC: 32.9 G/DL (ref 32.2–35.5)
MCV RBC AUTO: 94.6 FL (ref 81.4–97.8)
MICRO URNS: NORMAL
MONOCYTES # BLD AUTO: 1.08 K/UL (ref 0–0.85)
MONOCYTES NFR BLD AUTO: 6.9 % (ref 0–13.4)
NEUTROPHILS # BLD AUTO: 12.9 K/UL (ref 1.82–7.42)
NEUTROPHILS NFR BLD: 82.3 % (ref 44–72)
NITRITE UR QL STRIP.AUTO: NEGATIVE
NRBC # BLD AUTO: 0 K/UL
NRBC BLD-RTO: 0 /100 WBC (ref 0–0.2)
PH UR STRIP.AUTO: 5.5 [PH] (ref 5–8)
PLATELET # BLD AUTO: 301 K/UL (ref 164–446)
PMV BLD AUTO: 9.7 FL (ref 9–12.9)
POTASSIUM SERPL-SCNC: 3.9 MMOL/L (ref 3.6–5.5)
PROT SERPL-MCNC: 6.8 G/DL (ref 6–8.2)
PROT UR QL STRIP: NEGATIVE MG/DL
RBC # BLD AUTO: 4.24 M/UL (ref 4.2–5.4)
RBC UR QL AUTO: NEGATIVE
SODIUM SERPL-SCNC: 138 MMOL/L (ref 135–145)
SP GR UR STRIP.AUTO: >1.045
TSH SERPL DL<=0.005 MIU/L-ACNC: 4.03 UIU/ML (ref 0.38–5.33)
UROBILINOGEN UR STRIP.AUTO-MCNC: 0.2 EU/DL
WBC # BLD AUTO: 15.7 K/UL (ref 4.8–10.8)

## 2025-06-21 PROCEDURE — 99223 1ST HOSP IP/OBS HIGH 75: CPT | Mod: AI | Performed by: HOSPITALIST

## 2025-06-21 PROCEDURE — 74177 CT ABD & PELVIS W/CONTRAST: CPT

## 2025-06-21 PROCEDURE — 83690 ASSAY OF LIPASE: CPT

## 2025-06-21 PROCEDURE — 99285 EMERGENCY DEPT VISIT HI MDM: CPT

## 2025-06-21 PROCEDURE — 8E0ZXY6 ISOLATION: ICD-10-PCS | Performed by: HOSPITALIST

## 2025-06-21 PROCEDURE — 87046 STOOL CULTR AEROBIC BACT EA: CPT

## 2025-06-21 PROCEDURE — 96376 TX/PRO/DX INJ SAME DRUG ADON: CPT

## 2025-06-21 PROCEDURE — 700102 HCHG RX REV CODE 250 W/ 637 OVERRIDE(OP): Performed by: HOSPITALIST

## 2025-06-21 PROCEDURE — 700111 HCHG RX REV CODE 636 W/ 250 OVERRIDE (IP): Mod: JZ | Performed by: HOSPITALIST

## 2025-06-21 PROCEDURE — 770001 HCHG ROOM/CARE - MED/SURG/GYN PRIV*

## 2025-06-21 PROCEDURE — 36415 COLL VENOUS BLD VENIPUNCTURE: CPT

## 2025-06-21 PROCEDURE — 80053 COMPREHEN METABOLIC PANEL: CPT

## 2025-06-21 PROCEDURE — 700117 HCHG RX CONTRAST REV CODE 255: Performed by: EMERGENCY MEDICINE

## 2025-06-21 PROCEDURE — 87493 C DIFF AMPLIFIED PROBE: CPT

## 2025-06-21 PROCEDURE — A9270 NON-COVERED ITEM OR SERVICE: HCPCS | Performed by: HOSPITALIST

## 2025-06-21 PROCEDURE — 96374 THER/PROPH/DIAG INJ IV PUSH: CPT

## 2025-06-21 PROCEDURE — 700111 HCHG RX REV CODE 636 W/ 250 OVERRIDE (IP): Mod: JZ | Performed by: EMERGENCY MEDICINE

## 2025-06-21 PROCEDURE — 85025 COMPLETE CBC W/AUTO DIFF WBC: CPT

## 2025-06-21 PROCEDURE — 87899 AGENT NOS ASSAY W/OPTIC: CPT

## 2025-06-21 PROCEDURE — 81003 URINALYSIS AUTO W/O SCOPE: CPT

## 2025-06-21 PROCEDURE — 87045 FECES CULTURE AEROBIC BACT: CPT

## 2025-06-21 PROCEDURE — 84443 ASSAY THYROID STIM HORMONE: CPT

## 2025-06-21 PROCEDURE — A9270 NON-COVERED ITEM OR SERVICE: HCPCS | Performed by: EMERGENCY MEDICINE

## 2025-06-21 PROCEDURE — 700105 HCHG RX REV CODE 258: Performed by: HOSPITALIST

## 2025-06-21 PROCEDURE — 96375 TX/PRO/DX INJ NEW DRUG ADDON: CPT

## 2025-06-21 PROCEDURE — 700102 HCHG RX REV CODE 250 W/ 637 OVERRIDE(OP): Performed by: EMERGENCY MEDICINE

## 2025-06-21 RX ORDER — OXYCODONE HYDROCHLORIDE 10 MG/1
10 TABLET ORAL
Refills: 0 | Status: DISCONTINUED | OUTPATIENT
Start: 2025-06-21 | End: 2025-06-22

## 2025-06-21 RX ORDER — ONDANSETRON 4 MG/1
4 TABLET, ORALLY DISINTEGRATING ORAL EVERY 6 HOURS PRN
Qty: 20 TABLET | Refills: 0 | Status: SHIPPED | OUTPATIENT
Start: 2025-06-21 | End: 2025-07-30 | Stop reason: SDUPTHER

## 2025-06-21 RX ORDER — BUPROPION HYDROCHLORIDE 150 MG/1
150 TABLET ORAL EVERY MORNING
Status: DISCONTINUED | OUTPATIENT
Start: 2025-06-21 | End: 2025-06-21

## 2025-06-21 RX ORDER — MORPHINE SULFATE 4 MG/ML
4 INJECTION INTRAVENOUS
Status: DISCONTINUED | OUTPATIENT
Start: 2025-06-21 | End: 2025-06-22

## 2025-06-21 RX ORDER — ACETAMINOPHEN 325 MG/1
650 TABLET ORAL EVERY 6 HOURS PRN
Status: DISCONTINUED | OUTPATIENT
Start: 2025-06-21 | End: 2025-06-23 | Stop reason: HOSPADM

## 2025-06-21 RX ORDER — AZITHROMYCIN 250 MG/1
500 TABLET, FILM COATED ORAL ONCE
Status: COMPLETED | OUTPATIENT
Start: 2025-06-21 | End: 2025-06-21

## 2025-06-21 RX ORDER — ONDANSETRON 2 MG/ML
4 INJECTION INTRAMUSCULAR; INTRAVENOUS ONCE
Status: COMPLETED | OUTPATIENT
Start: 2025-06-21 | End: 2025-06-21

## 2025-06-21 RX ORDER — PROPRANOLOL HYDROCHLORIDE 10 MG/1
10 TABLET ORAL
Status: DISCONTINUED | OUTPATIENT
Start: 2025-06-21 | End: 2025-06-23 | Stop reason: HOSPADM

## 2025-06-21 RX ORDER — BUPROPION HYDROCHLORIDE 150 MG/1
150 TABLET, EXTENDED RELEASE ORAL DAILY
Status: DISCONTINUED | OUTPATIENT
Start: 2025-06-22 | End: 2025-06-23 | Stop reason: HOSPADM

## 2025-06-21 RX ORDER — AZITHROMYCIN 250 MG/1
500 TABLET, FILM COATED ORAL DAILY
Status: COMPLETED | OUTPATIENT
Start: 2025-06-21 | End: 2025-06-23

## 2025-06-21 RX ORDER — ONDANSETRON 2 MG/ML
4 INJECTION INTRAMUSCULAR; INTRAVENOUS EVERY 4 HOURS PRN
Status: DISCONTINUED | OUTPATIENT
Start: 2025-06-21 | End: 2025-06-23 | Stop reason: HOSPADM

## 2025-06-21 RX ORDER — HYDROCODONE BITARTRATE AND ACETAMINOPHEN 5; 325 MG/1; MG/1
1 TABLET ORAL EVERY 6 HOURS PRN
Qty: 10 TABLET | Refills: 0 | Status: SHIPPED | OUTPATIENT
Start: 2025-06-21 | End: 2025-06-26

## 2025-06-21 RX ORDER — OXYCODONE HYDROCHLORIDE 5 MG/1
5 TABLET ORAL
Refills: 0 | Status: DISCONTINUED | OUTPATIENT
Start: 2025-06-21 | End: 2025-06-22

## 2025-06-21 RX ORDER — AZITHROMYCIN 500 MG/1
500 TABLET, FILM COATED ORAL DAILY
Qty: 3 TABLET | Refills: 0 | Status: ACTIVE | OUTPATIENT
Start: 2025-06-21 | End: 2025-06-24

## 2025-06-21 RX ORDER — ONDANSETRON 4 MG/1
4 TABLET, ORALLY DISINTEGRATING ORAL EVERY 4 HOURS PRN
Status: DISCONTINUED | OUTPATIENT
Start: 2025-06-21 | End: 2025-06-23 | Stop reason: HOSPADM

## 2025-06-21 RX ORDER — ESCITALOPRAM OXALATE 10 MG/1
20 TABLET ORAL DAILY
Status: DISCONTINUED | OUTPATIENT
Start: 2025-06-22 | End: 2025-06-23 | Stop reason: HOSPADM

## 2025-06-21 RX ORDER — SODIUM CHLORIDE, SODIUM LACTATE, POTASSIUM CHLORIDE, CALCIUM CHLORIDE 600; 310; 30; 20 MG/100ML; MG/100ML; MG/100ML; MG/100ML
INJECTION, SOLUTION INTRAVENOUS CONTINUOUS
Status: DISCONTINUED | OUTPATIENT
Start: 2025-06-21 | End: 2025-06-23 | Stop reason: HOSPADM

## 2025-06-21 RX ADMIN — ONDANSETRON 4 MG: 2 INJECTION INTRAMUSCULAR; INTRAVENOUS at 14:41

## 2025-06-21 RX ADMIN — IOHEXOL 100 ML: 350 INJECTION, SOLUTION INTRAVENOUS at 10:28

## 2025-06-21 RX ADMIN — SODIUM CHLORIDE, POTASSIUM CHLORIDE, SODIUM LACTATE AND CALCIUM CHLORIDE: 600; 310; 30; 20 INJECTION, SOLUTION INTRAVENOUS at 13:21

## 2025-06-21 RX ADMIN — FENTANYL CITRATE 50 MCG: 50 INJECTION, SOLUTION INTRAMUSCULAR; INTRAVENOUS at 10:03

## 2025-06-21 RX ADMIN — OXYCODONE HYDROCHLORIDE 10 MG: 10 TABLET ORAL at 14:40

## 2025-06-21 RX ADMIN — LIDOCAINE HYDROCHLORIDE 30 ML: 20 SOLUTION ORAL; TOPICAL at 10:03

## 2025-06-21 RX ADMIN — OXYCODONE 5 MG: 5 TABLET ORAL at 19:37

## 2025-06-21 RX ADMIN — ONDANSETRON 4 MG: 2 INJECTION INTRAMUSCULAR; INTRAVENOUS at 10:03

## 2025-06-21 RX ADMIN — SODIUM CHLORIDE, POTASSIUM CHLORIDE, SODIUM LACTATE AND CALCIUM CHLORIDE: 600; 310; 30; 20 INJECTION, SOLUTION INTRAVENOUS at 23:15

## 2025-06-21 RX ADMIN — AZITHROMYCIN DIHYDRATE 500 MG: 250 TABLET ORAL at 11:46

## 2025-06-21 RX ADMIN — AZITHROMYCIN DIHYDRATE 500 MG: 250 TABLET ORAL at 14:41

## 2025-06-21 RX ADMIN — ONDANSETRON 4 MG: 2 INJECTION INTRAMUSCULAR; INTRAVENOUS at 19:37

## 2025-06-21 RX ADMIN — FENTANYL CITRATE 50 MCG: 50 INJECTION, SOLUTION INTRAMUSCULAR; INTRAVENOUS at 11:47

## 2025-06-21 SDOH — ECONOMIC STABILITY: TRANSPORTATION INSECURITY
IN THE PAST 12 MONTHS, HAS THE LACK OF TRANSPORTATION KEPT YOU FROM MEDICAL APPOINTMENTS OR FROM GETTING MEDICATIONS?: NO

## 2025-06-21 SDOH — ECONOMIC STABILITY: TRANSPORTATION INSECURITY
IN THE PAST 12 MONTHS, HAS LACK OF RELIABLE TRANSPORTATION KEPT YOU FROM MEDICAL APPOINTMENTS, MEETINGS, WORK OR FROM GETTING THINGS NEEDED FOR DAILY LIVING?: NO

## 2025-06-21 ASSESSMENT — ENCOUNTER SYMPTOMS
DIARRHEA: 1
HEADACHES: 0
LOSS OF CONSCIOUSNESS: 0
CHILLS: 0
BACK PAIN: 0
ABDOMINAL PAIN: 1
DIZZINESS: 0
PALPITATIONS: 0
BLOOD IN STOOL: 1
SHORTNESS OF BREATH: 0
NAUSEA: 1
FEVER: 0
COUGH: 0
VOMITING: 0

## 2025-06-21 ASSESSMENT — COGNITIVE AND FUNCTIONAL STATUS - GENERAL
SUGGESTED CMS G CODE MODIFIER DAILY ACTIVITY: CH
DAILY ACTIVITIY SCORE: 24
SUGGESTED CMS G CODE MODIFIER MOBILITY: CH
MOBILITY SCORE: 24

## 2025-06-21 ASSESSMENT — PATIENT HEALTH QUESTIONNAIRE - PHQ9
1. LITTLE INTEREST OR PLEASURE IN DOING THINGS: NOT AT ALL
SUM OF ALL RESPONSES TO PHQ9 QUESTIONS 1 AND 2: 0
2. FEELING DOWN, DEPRESSED, IRRITABLE, OR HOPELESS: NOT AT ALL

## 2025-06-21 ASSESSMENT — LIFESTYLE VARIABLES
HAVE YOU EVER FELT YOU SHOULD CUT DOWN ON YOUR DRINKING: NO
EVER HAD A DRINK FIRST THING IN THE MORNING TO STEADY YOUR NERVES TO GET RID OF A HANGOVER: NO
DOES PATIENT WANT TO STOP DRINKING: NO
EVER FELT BAD OR GUILTY ABOUT YOUR DRINKING: NO
CONSUMPTION TOTAL: NEGATIVE
HAVE PEOPLE ANNOYED YOU BY CRITICIZING YOUR DRINKING: NO
TOTAL SCORE: 0
AVERAGE NUMBER OF DAYS PER WEEK YOU HAVE A DRINK CONTAINING ALCOHOL: 7
ALCOHOL_USE: YES
ON A TYPICAL DAY WHEN YOU DRINK ALCOHOL HOW MANY DRINKS DO YOU HAVE: 1
TOTAL SCORE: 0
HOW MANY TIMES IN THE PAST YEAR HAVE YOU HAD 5 OR MORE DRINKS IN A DAY: 0
TOTAL SCORE: 0

## 2025-06-21 ASSESSMENT — PAIN DESCRIPTION - PAIN TYPE
TYPE: ACUTE PAIN
TYPE: ACUTE PAIN

## 2025-06-21 ASSESSMENT — SOCIAL DETERMINANTS OF HEALTH (SDOH)
WITHIN THE PAST 12 MONTHS, YOU WORRIED THAT YOUR FOOD WOULD RUN OUT BEFORE YOU GOT THE MONEY TO BUY MORE: NEVER TRUE
IN THE PAST 12 MONTHS, HAS THE ELECTRIC, GAS, OIL, OR WATER COMPANY THREATENED TO SHUT OFF SERVICE IN YOUR HOME?: NO
WITHIN THE LAST YEAR, HAVE YOU BEEN HUMILIATED OR EMOTIONALLY ABUSED IN OTHER WAYS BY YOUR PARTNER OR EX-PARTNER?: NO
WITHIN THE LAST YEAR, HAVE TO BEEN RAPED OR FORCED TO HAVE ANY KIND OF SEXUAL ACTIVITY BY YOUR PARTNER OR EX-PARTNER?: NO
WITHIN THE LAST YEAR, HAVE YOU BEEN AFRAID OF YOUR PARTNER OR EX-PARTNER?: NO
WITHIN THE LAST YEAR, HAVE YOU BEEN KICKED, HIT, SLAPPED, OR OTHERWISE PHYSICALLY HURT BY YOUR PARTNER OR EX-PARTNER?: NO
WITHIN THE PAST 12 MONTHS, THE FOOD YOU BOUGHT JUST DIDN'T LAST AND YOU DIDN'T HAVE MONEY TO GET MORE: NEVER TRUE

## 2025-06-21 ASSESSMENT — FIBROSIS 4 INDEX
FIB4 SCORE: 0.97
FIB4 SCORE: 0.98

## 2025-06-21 NOTE — ED NOTES
Pt ambulatory to [facility] from [facility]. Triage note reviewed and agreed with.  Dressed down to gown, placed on continuous monitoring, call light within reach.  Chart up for ERP.

## 2025-06-21 NOTE — PROGRESS NOTES
4 Eyes Skin Assessment Completed by DIVINA Arevalo and DIVINA Colon.    Skin assessment is primarily focused on high risk bony prominences. Pay special attention to skin beneath and around medical devices, high risk bony prominences, skin to skin areas and areas where the patient lacks sensation to feel pain and areas where the patient previously had breakdown.     Head (Occipital):  WDL   Ears (Under Medical Devices): Red and Blanching   Nose (Under Medical Devices): WDL   Mouth:  WDL   Neck: WDL   Breast/Chest:  WDL   Shoulder Blades:  WDL   Spine:   WDL   (R) Arm/Elbow/Hand: Pink and Blanching   (L) Arm/Elbow/Hand: Pink and Blanching   Abdomen: WDL   Pannus/Groin:  WDL   Sacrum/Coccyx:   Red and Blanching   (R) Ischial Tuberosity (Sit Bones):  WDL   (L) Ischial Tuberosity (Sit Bones):  WDL   (R) Leg:  WDL   (L) Leg:  WDL   (R) Heel:  Red and Blanching   (R) Foot/Toe: WDL   (L) Heel: Red and Blanching   (L) Foot/Toe:  WDL       DEVICES IN USE:   Respiratory Devices:  Nasal cannula  Feeding Devices:  N/A   Lines & BP Monitoring Devices:  Peripheral IV    Orthopedic Devices:  N/A  Miscellaneous Devices:  N/A    PROTOCOL INTERVENTIONS:   Standard/Trauma Bed:  Already in place  Nasal Cannula with Gray Foams:  Already in place    WOUND PHOTOS:   N/A no wounds identified    WOUND CONSULT:   N/A, no advanced wound care needs identified

## 2025-06-21 NOTE — ED TRIAGE NOTES
"Chief Complaint   Patient presents with    Abdominal Pain     Pt has diverticulosis - thinks she is having a flare up. Bright red bloody diarrhea since 0400        71 yo female to triage for above complaint. Ambulatory with family.     Pt is alert and oriented, speaking in full sentences, follows commands and responds appropriately to questions.     Patient placed back in lobby and educated on triage process. Asked to inform RN of any changes.    /60   Pulse 60   Temp 37.2 °C (98.9 °F) (Temporal)   Resp 18   Ht 1.651 m (5' 5\")   Wt 71.5 kg (157 lb 10.1 oz)   SpO2 99%   BMI 26.23 kg/m²     "

## 2025-06-21 NOTE — DISCHARGE INSTRUCTIONS
You completed 3 day course of antibiotics. Recommend that you stay hydrated and eat a gentle diet to avoid stomach upset. If you develop any worsening pain, ongoing bloody diarrhea, fevers or chills or any other concerning symptoms seek medical attention   Do recommend that you follow up with your PCP in the next 1-2 weeks to ensure you are doing well   I have placed a referral to [facility], they should call to schedule if you do not hear from their office information for both GI offices are provided

## 2025-06-21 NOTE — DISCHARGE PLANNING
"[facility] following. HTH/SCP chart review completed. Note pt currently in ED secondary to abdominal pain.    Per review, pt remains ambulatory and is currently on RA. At this time anticipating that pt will remain functionally capable of dc to home once medically appropriate (either directly from ED or after admission to [facility] if warranted). Per chart patient has non [facility] PCP (MYKEL). Note per review pt with frequent communication and office visits with her PCP office.     If pt admits to [facility], [facility] will continue to monitor and assist with SCP/HTH authorization consideration for post acute needs if indicated. Please reach out to [facility] via VOALTE if post acute transitional care needs are warranted for dc planning.     Update: pt admitted/inpatient status 2' to \"colitis\"  "

## 2025-06-21 NOTE — ASSESSMENT & PLAN NOTE
Patient presents with bloody diarrhea, abdominal pain which is acute in onset.  Patient had a colonoscopy about 2 years ago which was positive for some precancerous polyps and she was told to get a follow-up in 3 years.  CT scan showing colitis  Labs relatively unremarkable  Recent history of antibiotics for UTI, C.diff  is negative   Infectious versus ischemic versus inflammatory  Check GI PCR panel, stool culture   Empiric treatment with azithromycin  Supportive care with IV fluids, antiemetics, and pain medication  Low threshold to workup further should she fail to improve or show any worsening signs

## 2025-06-21 NOTE — ED PROVIDER NOTES
ED Provider Note    Scribed for Mikayla Gallego M.D. by Benny Borjas. 6/21/2025, 9:44 AM.    Primary care provider: ELDER Chang  Means of arrival: Walk-in  History obtained from: Patient  History limited by: None    CHIEF COMPLAINT  Chief Complaint   Patient presents with    Abdominal Pain     Pt has diverticulosis - thinks she is having a flare up. Bright red bloody diarrhea since 0400      HPI/ROS  Allyn Rosenberg is a 70 y.o. female who presents to the Emergency Department for evaluation of acute periumbilical abdominal pain onset 4 AM this morning. The patient explains that she suddenly woke up with periumbilical abdominal pain and bright red bloody diarrhea this morning prompting visitation to the ED. States associated nausea but denies any vomiting. She reports a history of diverticulosis and expresses concern that she is currently having a flare up. Patient reportedly had a UTI 4 weeks ago and was placed on antibiotics for treatment. She denies being currently followed by GI and reports last receiving a colonoscopy by an unknown physician around 2 years ago which has normal except for diverticula. Patient adds a medical history of osteoporosis and a surgical history of a hysterectomy. She does not take any blood thinners.     EXTERNAL RECORDS REVIEWED  Hospital records reviewed which show the patient was seen by their PCP 5/9/25. She has a medical history of postmenopausal osteoporosis and dyslipidemia. She was found to have trigger little finger of right hand and was thus provided with a referral to orthopedics for follow up.     LIMITATION TO HISTORY   Select: : None    OUTSIDE HISTORIAN(S):  Significant other present at bedside to provide additional context to history      PAST MEDICAL HISTORY   has a past medical history of Acquired hypothyroidism (01/29/2021), Acute nasopharyngitis (09/29/2023), Age-related incipient cataract of both eyes (12/07/2022), Age-related osteoporosis without  current pathological fracture (10/12/2022), Anxiety, Diverticulitis (06/15/2021), Dizziness (09/29/2023), Dyslipidemia (01/29/2021), Falls (09/20/2021), Gastroesophageal reflux disease without esophagitis (12/07/2022), Genetic screening (05/03/2022), History of smoking (05/03/2022), Low vitamin D level (05/04/2022), Mixed stress and urge urinary incontinence (05/03/2022), Palpitations (01/29/2021), Primary insomnia (12/07/2022), Right hip pain (12/07/2022), and Urinary incontinence (02/03/2021).    SURGICAL HISTORY   has a past surgical history that includes abdominal hysterectomy total; tonsillectomy and adenoidectomy (Bilateral); hysterectomy, total abdominal (Bilateral); brca1&2 gen full seq dup/del; primary c section; and tubal coagulation laparoscopic bilateral.    SOCIAL HISTORY  Social History     Tobacco Use    Smoking status: Former     Current packs/day: 0.50     Average packs/day: 0.5 packs/day for 10.0 years (5.0 ttl pk-yrs)     Types: Cigarettes    Smokeless tobacco: Never   Vaping Use    Vaping status: Never Used   Substance Use Topics    Alcohol use: Yes     Alcohol/week: 1.8 oz     Types: 3 Glasses of wine per week    Drug use: No      FAMILY HISTORY  Family History   Problem Relation Age of Onset    Hypertension Father     Lung Disease Father     Other Father     Heart Disease Neg Hx      CURRENT MEDICATIONS  Home Medications       Reviewed by Ricarda Aleman R.N. (Registered Nurse) on 06/21/25 at 0853  Med List Status: Partial     Medication Last Dose Status   buPROPion (WELLBUTRIN XL) 150 MG XL tablet  Active   escitalopram (LEXAPRO) 20 MG tablet  Active   famotidine (PEPCID) 40 MG Tab  Active   Multiple Vitamins-Minerals (CENTRUM SILVER ADULT 50+) Tab  Active   propranolol (INDERAL) 10 MG Tab  Active   Specialty Vitamins Products (ONE-A-DAY BONE STRENGTH PO)  Active   valacyclovir (VALTREX) 1 GM Tab  Active   zoledronic Acid (RECLAST) 5 MG/100ML Solution IVPB premix  Active               "    Audit from Redirected Encounters    **Home medications have not yet been reviewed for this encounter**       ALLERGIES  Allergies   Allergen Reactions    Peanut (Diagnostic) Shortness of Breath    Erythromycin Vomiting and Nausea     PHYSICAL EXAM  VITAL SIGNS: /60   Pulse 60   Temp 37.2 °C (98.9 °F) (Temporal)   Resp 18   Ht 1.651 m (5' 5\")   Wt 71.5 kg (157 lb 10.1 oz)   SpO2 99%   BMI 26.23 kg/m²     Nursing note and vitals reviewed.  Constitutional: Well-developed and well-nourished. Appears uncomfortable.   HENT: Head is normocephalic and atraumatic.  Eyes: extra-ocular movements intact  Cardiovascular: Regular rate and regular rhythm. No murmur heard.  Pulmonary/Chest: Breath sounds normal. No wheezes or rales.   Abdominal: Soft. Tenderness in bilateral lower quadrants. No distention.  No palpable masses  Musculoskeletal: Extremities exhibit normal range of motion without edema or tenderness.   Neurological: Awake and alert  Skin: Skin is warm and dry. No rash.     DIAGNOSTIC STUDIES:    LABS  Labs Reviewed   CBC WITH DIFFERENTIAL - Abnormal; Notable for the following components:       Result Value    WBC 15.7 (*)     Neutrophils-Polys 82.30 (*)     Lymphocytes 9.50 (*)     Neutrophils (Absolute) 12.90 (*)     Monos (Absolute) 1.08 (*)     All other components within normal limits   COMP METABOLIC PANEL - Abnormal; Notable for the following components:    Co2 18 (*)     Glucose 114 (*)     All other components within normal limits   LIPASE   ESTIMATED GFR   URINALYSIS   CULTURE STOOL   CDIFF BY PCR RFLX TOXIN   All labs reviewed and independently interpreted by myself    RADIOLOGY  Images independently interpreted by myself prior to radiologist review:  - CT demonstrates no evidence of bowel obstruction    Final interpretation by radiology demonstrates:  CT-ABDOMEN-PELVIS WITH   Final Result      1.  Long segment wall thickening in the descending and sigmoid colon, in keeping with colitis. "   2.  Hepatic steatosis.      The radiologist's interpretation of all radiological studies have been reviewed by me.    COURSE & MEDICAL DECISION MAKING    INITIAL ASSESSMENT, ED COURSE AND PLAN    Patient is a 70-year-old female who presents for evaluation of nausea and bloody diarrhea.  Differential diagnosis includes colitis, diverticulitis, gastroenteritis.  Diagnostic workup includes labs and CT of the abdomen.    Patient's initial vitals are within normal limits.  Patient is treated with GI cocktail, fentanyl and Zofran for pain after which she initially feels improved.  Labs returned are notable for leukocytosis of 15.7.  Labs are otherwise unremarkable.  CT returns and is consistent with colitis.  Patient does have risk factors for C. difficile colitis that she had antibiotic use within the last 90 days but I have lower suspicion for this.  I have sent cultures for this.  Upon reassessment patient feeling improved and is amenable to discharge.  I will prescribe her pain medicine and azithromycin for management of symptoms at home.    Unfortunately while awaiting a dose of azithromycin in the emergency department patient again began to have severe abdominal pain and is treated with repeat dose of fentanyl.  Given her severe abdominal pain in the setting of colitis I will hospitalize her for ongoing management, patient discussed with hospitalist, patient hospitalized in guarded condition.    In prescribing controlled substances to this patient, I certify that I have obtained and reviewed the medical history of Allyn Rosenberg. I have also made a good sacha effort to obtain applicable records from other providers who have treated the patient and records did not demonstrate any increased risk of substance abuse that would prevent me from prescribing controlled substances.     I have conducted a physical exam and documented it. I have reviewed Ms. Rosenberg’s prescription history as maintained by the [place]  Prescription Monitoring Program.     I have assessed the patient’s risk for abuse, dependency, and addiction using the validated Opioid Risk Tool available at https://www.mdcalc.com/ojiggh-viys-mtik-ort-narcotic-abuse.     Given the above, I believe the benefits of controlled substance therapy outweigh the risks. The reasons for prescribing controlled substances include non-narcotic, oral analgesic alternatives have been inadequate for pain control. Accordingly, I have discussed the risk and benefits, treatment plan, and alternative therapies with the patient.       DISPOSITION AND DISCUSSIONS  I have discussed management of the patient with the following physicians and KIERRA's:  Dr. Haile    Discussion of management with other [facility] or appropriate source(s): none     Escalation of care considered, and ultimately not performed:see above    Barriers to care at this time, including but not limited to: None.     Decision tools and prescription drugs considered including, but not limited to: see above.    FINAL IMPRESSION  1. Lower abdominal pain    2. Colitis    3. Bloody stools    4. Nausea          IBenny (Scribe), am scribing for, and in the presence of, Mikayla Gallego M.D..    Electronically signed by: Benny Borjas (Scribe), 6/21/2025    IMikayla M.D. personally performed the services described in this documentation, as scribed by Benny Borjas in my presence, and it is both accurate and complete.    The note accurately reflects work and decisions made by me.  Mikayla Gallego M.D.  6/21/2025  12:18 PM

## 2025-06-21 NOTE — CARE PLAN
The patient is Watcher - Medium risk of patient condition declining or worsening    Shift Goals  Clinical Goals: continue abx    Progress made toward(s) clinical / shift goals:        Problem: Knowledge Deficit - Standard  Goal: Patient and family/care givers will demonstrate understanding of plan of care, disease process/condition, diagnostic tests and medications  Description: Target End Date:  1-3 days or as soon as patient condition allows    Document in Patient Education    1.  Patient and family/caregiver oriented to unit, equipment, visitation policy and means for communicating concern  2.  Complete/review Learning Assessment  3.  Assess knowledge level of disease process/condition, treatment plan, diagnostic tests and medications  4.  Explain disease process/condition, treatment plan, diagnostic tests and medications  Outcome: Progressing     Problem: Pain - Standard  Goal: Alleviation of pain or a reduction in pain to the patient’s comfort goal  Description: Target End Date:  Prior to discharge or change in level of care    Document on Vitals flowsheet    1.  Document pain using the appropriate pain scale per order or unit policy  2.  Educate and implement non-pharmacologic comfort measures (i.e. relaxation, distraction, massage, cold/heat therapy, etc.)  3.  Pain management medications as ordered  4.  Reassess pain after pain med administration per policy  5.  If opiods administered assess patient's response to pain medication is appropriate per POSS sedation scale  6.  Follow pain management plan developed in collaboration with patient and interdisciplinary team (including palliative care or pain specialists if applicable)  Outcome: Progressing       Patient is not progressing towards the following goals:

## 2025-06-21 NOTE — H&P
Hospital Medicine History & Physical Note    Date of Service  6/21/2025    Primary Care Physician  ELDER Chang    Consultants      Specialist Names:     Code Status  Full Code    Chief Complaint  Chief Complaint   Patient presents with    Abdominal Pain     Pt has diverticulosis - thinks she is having a flare up. Bright red bloody diarrhea since 0400        History of Presenting Illness  Allyn Rosenberg is a 70 y.o. female who presented 6/21/2025 with history of dyslipidemia, hypothyroidism, depression    Patient presents for evaluation of abdominal pain and diarrhea with bloody bowel movements.  She reports symptom began early this morning.  She initially felt pain in her upper abdomen associate with bloating, and then had a diarrhea bowel movement followed by a second bowel movement which was frankly bloody.  She has not had any melena.  She has had some mild nausea but no vomiting.  Some cold sweats, but no fever that she is aware of.  No recent travel though she did go to [place] about 1 month ago.  She did take antibiotics 1 month ago for UTI, she thinks it was a sulfa drug but she is uncertain which 1 exactly.  No sick contacts other than a grandchild with cold.    I discussed the plan of care with patient.    Review of Systems  Review of Systems   Constitutional:  Negative for chills and fever.   Respiratory:  Negative for cough and shortness of breath.    Cardiovascular:  Negative for chest pain, palpitations and leg swelling.   Gastrointestinal:  Positive for abdominal pain, blood in stool, diarrhea and nausea. Negative for melena and vomiting.   Genitourinary:  Negative for dysuria and urgency.   Musculoskeletal:  Negative for back pain.   Skin:  Negative for rash.   Neurological:  Negative for dizziness, loss of consciousness and headaches.       Past Medical History   has a past medical history of Acquired hypothyroidism (01/29/2021), Acute nasopharyngitis (09/29/2023), Age-related  incipient cataract of both eyes (12/07/2022), Age-related osteoporosis without current pathological fracture (10/12/2022), Anxiety, Diverticulitis (06/15/2021), Dizziness (09/29/2023), Dyslipidemia (01/29/2021), Falls (09/20/2021), Gastroesophageal reflux disease without esophagitis (12/07/2022), Genetic screening (05/03/2022), History of smoking (05/03/2022), Low vitamin D level (05/04/2022), Mixed stress and urge urinary incontinence (05/03/2022), Palpitations (01/29/2021), Primary insomnia (12/07/2022), Right hip pain (12/07/2022), and Urinary incontinence (02/03/2021).    Surgical History   has a past surgical history that includes abdominal hysterectomy total; tonsillectomy and adenoidectomy (Bilateral); hysterectomy, total abdominal (Bilateral); brca1&2 gen full seq dup/del; primary c section; and tubal coagulation laparoscopic bilateral.     Family History  Family History   Problem Relation Age of Onset    Hypertension Father     Lung Disease Father     Other Father     Heart Disease Neg Hx         Family history reviewed with patient. There is no family history that is pertinent to the chief complaint.     Social History   reports that she has quit smoking. Her smoking use included cigarettes. She has a 5 pack-year smoking history. She has never used smokeless tobacco. She reports current alcohol use of about 1.8 oz of alcohol per week. She reports that she does not use drugs.    Allergies  Allergies[1]    Medications  Prior to Admission Medications   Prescriptions Last Dose Informant Patient Reported? Taking?   buPROPion (WELLBUTRIN XL) 150 MG XL tablet   No No   Sig: Take 1 Tablet by mouth every morning.   escitalopram (LEXAPRO) 20 MG tablet   No No   Sig: Take 1 Tablet by mouth every day. FOR ANXIETY AND DEPRESSION   propranolol (INDERAL) 10 MG Tab   No No   Sig: Take 1 Tablet by mouth 1 time a day as needed (ANXIETY).      Facility-Administered Medications: None       Physical Exam  Temp:  [37.2 °C (98.9  "°F)] 37.2 °C (98.9 °F)  Pulse:  [58-62] 62  Resp:  [18-20] 18  BP: (109-138)/(59-65) 138/65  SpO2:  [85 %-99 %] 98 %  Blood Pressure : 138/65   Temperature: 37.2 °C (98.9 °F)   Pulse: 62   Respiration: 18   Pulse Oximetry: 98 %       Physical Exam  Constitutional:       General: She is not in acute distress.     Appearance: Normal appearance. She is well-developed. She is not diaphoretic.   HENT:      Head: Normocephalic and atraumatic.   Neck:      Vascular: No JVD.   Cardiovascular:      Rate and Rhythm: Normal rate and regular rhythm.      Heart sounds: No murmur heard.  Pulmonary:      Effort: Pulmonary effort is normal. No respiratory distress.      Breath sounds: No stridor. No wheezing or rales.   Abdominal:      General: There is no distension.      Palpations: Abdomen is soft.      Tenderness: There is abdominal tenderness. There is no guarding or rebound.      Comments: Min tenderness to palpation in the lower quadrants, no guarding or rebound, and patient is soft throughout.  Negative Gallego's and McBurney's   Skin:     General: Skin is warm and dry.      Findings: No rash.   Neurological:      Mental Status: She is oriented to person, place, and time.   Psychiatric:         Mood and Affect: Mood normal.         Behavior: Behavior normal.         Thought Content: Thought content normal.         Laboratory:  Recent Labs     06/21/25  0908   WBC 15.7*   RBC 4.24   HEMOGLOBIN 13.2   HEMATOCRIT 40.1   MCV 94.6   MCH 31.1   MCHC 32.9   RDW 45.5   PLATELETCT 301   MPV 9.7     Recent Labs     06/21/25  0908   SODIUM 138   POTASSIUM 3.9   CHLORIDE 106   CO2 18*   GLUCOSE 114*   BUN 20   CREATININE 0.72   CALCIUM 9.4     Recent Labs     06/21/25  0908   ALTSGPT 19   ASTSGOT 18   ALKPHOSPHAT 67   TBILIRUBIN 0.3   LIPASE 46   GLUCOSE 114*         No results for input(s): \"NTPROBNP\" in the last 72 hours.      No results for input(s): \"TROPONINT\" in the last 72 hours.    Imaging:  CT-ABDOMEN-PELVIS WITH   Final Result "      1.  Long segment wall thickening in the descending and sigmoid colon, in keeping with colitis.   2.  Hepatic steatosis.          X-Ray:  I have personally reviewed the images and compared with prior images.    Assessment/Plan:  Justification for Admission Status  I anticipate this patient will require at least two midnights for appropriate medical management, necessitating inpatient admission because colitis    Patient will need a Med/Surg bed on MEDICAL service .  The need is secondary to colitis.    * Colitis- (present on admission)  Assessment & Plan  Patient presents with bloody diarrhea, abdominal pain which is acute in onset.  Patient had a colonoscopy about 2 years ago which was positive for some precancerous polyps and she was told to get a follow-up in 3 years.  CT scan showing colitis  Labs relatively unremarkable  Recent history of antibiotics for UTI  Infectious versus ischemic versus inflammatory  Empiric treatment with azithromycin  Stool studies including culture, and C. Difficile  Supportive care with IV fluids, antiemetics, and pain medication  Low threshold to workup further should she fail to improve or show any worsening signs    Acquired hypothyroidism- (present on admission)  Assessment & Plan  Most recent TSH was 2 years ago 2.930.  Check TSH  Continue replacement        VTE prophylaxis: enoxaparin ppx       [1]   Allergies  Allergen Reactions    Peanut (Diagnostic) Shortness of Breath    Erythromycin Vomiting and Nausea

## 2025-06-22 LAB
ALBUMIN SERPL BCP-MCNC: 3.7 G/DL (ref 3.2–4.9)
ALBUMIN/GLOB SERPL: 1.6 G/DL
ALP SERPL-CCNC: 80 U/L (ref 30–99)
ALT SERPL-CCNC: 18 U/L (ref 2–50)
ANION GAP SERPL CALC-SCNC: 11 MMOL/L (ref 7–16)
AST SERPL-CCNC: 15 U/L (ref 12–45)
BILIRUB SERPL-MCNC: 0.6 MG/DL (ref 0.1–1.5)
BUN SERPL-MCNC: 12 MG/DL (ref 8–22)
CALCIUM ALBUM COR SERPL-MCNC: 9 MG/DL (ref 8.5–10.5)
CALCIUM SERPL-MCNC: 8.8 MG/DL (ref 8.5–10.5)
CHLORIDE SERPL-SCNC: 106 MMOL/L (ref 96–112)
CO2 SERPL-SCNC: 22 MMOL/L (ref 20–33)
CREAT SERPL-MCNC: 0.77 MG/DL (ref 0.5–1.4)
E COLI SXT1+2 STL IA: NORMAL
ERYTHROCYTE [DISTWIDTH] IN BLOOD BY AUTOMATED COUNT: 46.4 FL (ref 35.9–50)
GFR SERPLBLD CREATININE-BSD FMLA CKD-EPI: 82 ML/MIN/1.73 M 2
GLOBULIN SER CALC-MCNC: 2.3 G/DL (ref 1.9–3.5)
GLUCOSE SERPL-MCNC: 100 MG/DL (ref 65–99)
HCT VFR BLD AUTO: 36.1 % (ref 37–47)
HGB BLD-MCNC: 12 G/DL (ref 12–16)
MCH RBC QN AUTO: 31.3 PG (ref 27–33)
MCHC RBC AUTO-ENTMCNC: 33.2 G/DL (ref 32.2–35.5)
MCV RBC AUTO: 94.3 FL (ref 81.4–97.8)
PLATELET # BLD AUTO: 262 K/UL (ref 164–446)
PMV BLD AUTO: 9.8 FL (ref 9–12.9)
POTASSIUM SERPL-SCNC: 3.9 MMOL/L (ref 3.6–5.5)
PROT SERPL-MCNC: 6 G/DL (ref 6–8.2)
RBC # BLD AUTO: 3.83 M/UL (ref 4.2–5.4)
SIGNIFICANT IND 70042: NORMAL
SITE SITE: NORMAL
SODIUM SERPL-SCNC: 139 MMOL/L (ref 135–145)
SOURCE SOURCE: NORMAL
WBC # BLD AUTO: 11.6 K/UL (ref 4.8–10.8)

## 2025-06-22 PROCEDURE — 80053 COMPREHEN METABOLIC PANEL: CPT

## 2025-06-22 PROCEDURE — 700102 HCHG RX REV CODE 250 W/ 637 OVERRIDE(OP): Performed by: STUDENT IN AN ORGANIZED HEALTH CARE EDUCATION/TRAINING PROGRAM

## 2025-06-22 PROCEDURE — 770001 HCHG ROOM/CARE - MED/SURG/GYN PRIV*

## 2025-06-22 PROCEDURE — 700102 HCHG RX REV CODE 250 W/ 637 OVERRIDE(OP): Performed by: HOSPITALIST

## 2025-06-22 PROCEDURE — A9270 NON-COVERED ITEM OR SERVICE: HCPCS | Performed by: HOSPITALIST

## 2025-06-22 PROCEDURE — 85027 COMPLETE CBC AUTOMATED: CPT

## 2025-06-22 PROCEDURE — 700105 HCHG RX REV CODE 258: Performed by: HOSPITALIST

## 2025-06-22 PROCEDURE — 99233 SBSQ HOSP IP/OBS HIGH 50: CPT | Performed by: STUDENT IN AN ORGANIZED HEALTH CARE EDUCATION/TRAINING PROGRAM

## 2025-06-22 PROCEDURE — A9270 NON-COVERED ITEM OR SERVICE: HCPCS | Performed by: STUDENT IN AN ORGANIZED HEALTH CARE EDUCATION/TRAINING PROGRAM

## 2025-06-22 PROCEDURE — 700111 HCHG RX REV CODE 636 W/ 250 OVERRIDE (IP): Mod: JZ | Performed by: HOSPITALIST

## 2025-06-22 RX ORDER — OXYCODONE HYDROCHLORIDE 5 MG/1
5 TABLET ORAL
Refills: 0 | Status: DISCONTINUED | OUTPATIENT
Start: 2025-06-22 | End: 2025-06-23 | Stop reason: HOSPADM

## 2025-06-22 RX ORDER — MORPHINE SULFATE 4 MG/ML
2 INJECTION INTRAVENOUS
Status: DISCONTINUED | OUTPATIENT
Start: 2025-06-22 | End: 2025-06-23 | Stop reason: HOSPADM

## 2025-06-22 RX ORDER — OXYCODONE HYDROCHLORIDE 5 MG/1
2.5 TABLET ORAL
Refills: 0 | Status: DISCONTINUED | OUTPATIENT
Start: 2025-06-22 | End: 2025-06-23 | Stop reason: HOSPADM

## 2025-06-22 RX ADMIN — ONDANSETRON 4 MG: 2 INJECTION INTRAMUSCULAR; INTRAVENOUS at 12:17

## 2025-06-22 RX ADMIN — ACETAMINOPHEN 650 MG: 325 TABLET ORAL at 21:18

## 2025-06-22 RX ADMIN — OXYCODONE 5 MG: 5 TABLET ORAL at 03:53

## 2025-06-22 RX ADMIN — BUPROPION HYDROCHLORIDE 150 MG: 150 TABLET, FILM COATED, EXTENDED RELEASE ORAL at 05:11

## 2025-06-22 RX ADMIN — AZITHROMYCIN DIHYDRATE 500 MG: 250 TABLET ORAL at 05:11

## 2025-06-22 RX ADMIN — ONDANSETRON 4 MG: 2 INJECTION INTRAMUSCULAR; INTRAVENOUS at 03:53

## 2025-06-22 RX ADMIN — SODIUM CHLORIDE, POTASSIUM CHLORIDE, SODIUM LACTATE AND CALCIUM CHLORIDE: 600; 310; 30; 20 INJECTION, SOLUTION INTRAVENOUS at 09:35

## 2025-06-22 RX ADMIN — OXYCODONE 5 MG: 5 TABLET ORAL at 07:33

## 2025-06-22 RX ADMIN — ESCITALOPRAM OXALATE 20 MG: 10 TABLET ORAL at 05:11

## 2025-06-22 RX ADMIN — OXYCODONE 2.5 MG: 5 TABLET ORAL at 14:08

## 2025-06-22 ASSESSMENT — ENCOUNTER SYMPTOMS
MYALGIAS: 0
NAUSEA: 1
DIARRHEA: 1
CHILLS: 1
VOMITING: 0
SHORTNESS OF BREATH: 0
ABDOMINAL PAIN: 1
FLANK PAIN: 0
BLOOD IN STOOL: 1

## 2025-06-22 ASSESSMENT — PAIN DESCRIPTION - PAIN TYPE
TYPE: ACUTE PAIN

## 2025-06-22 NOTE — CARE PLAN
The patient is Stable - Low risk of patient condition declining or worsening    Shift Goals  Clinical Goals: Pt to have pain management less than 5 out 10 by end of shift  Patient Goals: Comfort  Family Goals: jairo    Progress made toward(s) clinical / shift goals:    Problem: Knowledge Deficit - Standard  Goal: Patient and family/care givers will demonstrate understanding of plan of care, disease process/condition, diagnostic tests and medications  Description: Target End Date:  1-3 days or as soon as patient condition allows    Document in Patient Education    1.  Patient and family/caregiver oriented to unit, equipment, visitation policy and means for communicating concern  2.  Complete/review Learning Assessment  3.  Assess knowledge level of disease process/condition, treatment plan, diagnostic tests and medications  4.  Explain disease process/condition, treatment plan, diagnostic tests and medications  Outcome: Progressing     Problem: Pain - Standard  Goal: Alleviation of pain or a reduction in pain to the patient’s comfort goal  Description: Target End Date:  Prior to discharge or change in level of care    Document on Vitals flowsheet    1.  Document pain using the appropriate pain scale per order or unit policy  2.  Educate and implement non-pharmacologic comfort measures (i.e. relaxation, distraction, massage, cold/heat therapy, etc.)  3.  Pain management medications as ordered  4.  Reassess pain after pain med administration per policy  5.  If opiods administered assess patient's response to pain medication is appropriate per POSS sedation scale  6.  Follow pain management plan developed in collaboration with patient and interdisciplinary team (including palliative care or pain specialists if applicable)  Outcome: Progressing     Pt given pain medication prn and pt reports overall improvement of pain from yesterday. Pt educated on tests and medications given. Pt on room air now.     Patient is not  progressing towards the following goals:

## 2025-06-22 NOTE — PROGRESS NOTES
[facility] Medicine Daily Progress Note    Date of Service  6/22/2025    Chief Complaint  Allyn Rosenberg is a 70 y.o. female admitted 6/21/2025 with LLQ abdominal pain and bloody diarrhea     Hospital Course  Allyn Rosenberg is a 70 y.o. female who presented 6/21/2025 with history of dyslipidemia, hypothyroidism, depression     Patient presents for evaluation of abdominal pain and diarrhea with bloody bowel movements.  She reports symptom began early morning of admission.  She initially felt pain in her upper abdomen associate with bloating, and then had a diarrhea bowel movement followed by a second bowel movement which was frankly bloody.  She has not had any melena.  She has had some mild nausea but no vomiting.  Some cold sweats, but no fever that she is aware of.  No recent travel though she did go to [place] about 1 month ago.  She did take antibiotics 1 month ago for UTI, she thinks it was a sulfa drug but she is uncertain which one exactly.  No sick contacts other than a grandchild with cold. She reports history of diverticulosis. She had a colonoscopy 2 years ago and noted to have pre-cancerous polyps and 3 year colonoscopy was recommended. She denies hx of IBD.     Interval Problem Update  Pt seen and examined, pain is better than yesterday. Still with loose bloody BM this AM. Would like to decrease strength of her pain medication offered. I have reduced dosing.   - continue with Azithromycin for now for tx of colitis   - denies any recent illness/periods of low BP or situations that could lead to ischemic bowel.   - her C.diff testing is neg. Will collect GI PCR panel   - continue IVF   - diet as tolerated   - monitor clinically   - despite bloody BM her H/H is stable, will continue to monitor   - if no improvement consider GI consultation     I have discussed this patient's plan of care and discharge plan at IDT rounds today with Case Management, Nursing, Nursing leadership, and other  members of the IDT team.    Consultants/Specialty  NA    Code Status  Full Code    Disposition  The patient is not medically cleared for discharge to home or a post-acute facility.  Anticipate discharge to: home with close outpatient follow-up    I have placed the appropriate orders for post-discharge needs.    Review of Systems  Review of Systems   Constitutional:  Positive for chills and malaise/fatigue.   Respiratory:  Negative for shortness of breath.    Cardiovascular:  Negative for chest pain.   Gastrointestinal:  Positive for abdominal pain, blood in stool, diarrhea and nausea. Negative for melena and vomiting.   Genitourinary:  Negative for dysuria, flank pain, frequency and urgency.   Musculoskeletal:  Negative for myalgias.        Physical Exam  Temp:  [36 °C (96.8 °F)-37 °C (98.6 °F)] 36 °C (96.8 °F)  Pulse:  [57-67] 61  Resp:  [17-19] 17  BP: (105-134)/(53-70) 105/62  SpO2:  [97 %-100 %] 99 %    Physical Exam  Vitals and nursing note reviewed.   Constitutional:       Appearance: She is ill-appearing.   HENT:      Head: Normocephalic and atraumatic.      Mouth/Throat:      Mouth: Mucous membranes are moist.      Pharynx: Oropharynx is clear.   Eyes:      Conjunctiva/sclera: Conjunctivae normal.   Cardiovascular:      Rate and Rhythm: Normal rate and regular rhythm.   Pulmonary:      Effort: Pulmonary effort is normal.   Abdominal:      General: There is no distension.      Palpations: Abdomen is soft.      Tenderness: There is abdominal tenderness. There is no guarding or rebound.      Comments: Tenderness in LLQ   Musculoskeletal:         General: Normal range of motion.      Cervical back: Neck supple.   Skin:     General: Skin is warm.   Neurological:      General: No focal deficit present.      Mental Status: She is alert and oriented to person, place, and time. Mental status is at baseline.   Psychiatric:         Mood and Affect: Mood normal.         Behavior: Behavior normal.         Thought  Content: Thought content normal.         Judgment: Judgment normal.         Fluids  No intake or output data in the 24 hours ending 06/22/25 1206     Laboratory  Recent Labs     06/21/25  0908 06/22/25  0439   WBC 15.7* 11.6*   RBC 4.24 3.83*   HEMOGLOBIN 13.2 12.0   HEMATOCRIT 40.1 36.1*   MCV 94.6 94.3   MCH 31.1 31.3   MCHC 32.9 33.2   RDW 45.5 46.4   PLATELETCT 301 262   MPV 9.7 9.8     Recent Labs     06/21/25  0908 06/22/25  0439   SODIUM 138 139   POTASSIUM 3.9 3.9   CHLORIDE 106 106   CO2 18* 22   GLUCOSE 114* 100*   BUN 20 12   CREATININE 0.72 0.77   CALCIUM 9.4 8.8                   Imaging  CT-ABDOMEN-PELVIS WITH   Final Result      1.  Long segment wall thickening in the descending and sigmoid colon, in keeping with colitis.   2.  Hepatic steatosis.           Assessment/Plan  * Colitis- (present on admission)  Assessment & Plan  Patient presents with bloody diarrhea, abdominal pain which is acute in onset.  Patient had a colonoscopy about 2 years ago which was positive for some precancerous polyps and she was told to get a follow-up in 3 years.  CT scan showing colitis  Labs relatively unremarkable  Recent history of antibiotics for UTI, C.diff  is negative   Infectious versus ischemic versus inflammatory  Check GI PCR panel, stool culture   Empiric treatment with azithromycin  Supportive care with IV fluids, antiemetics, and pain medication  Low threshold to workup further should she fail to improve or show any worsening signs    MDD (major depressive disorder), recurrent, in full remission (HCC)- (present on admission)  Assessment & Plan  Stable, continue home medications wellbutrin and lexapro     Acquired hypothyroidism- (present on admission)  Assessment & Plan  Most recent TSH was 2 years ago 2.930.  Check TSH  Continue replacement    Dyslipidemia- (present on admission)  Assessment & Plan  Hx, not on statin therapy  Check lipid panel          VTE prophylaxis:   SCDs/TEDs      I have performed a  physical exam and reviewed and updated ROS and Plan today (6/22/2025). In review of yesterday's note (6/21/2025), there are no changes except as documented above.

## 2025-06-22 NOTE — CARE PLAN
The patient is Stable - Low risk of patient condition declining or worsening    Shift Goals  Clinical Goals: abx, monitor pain throughout shift  Patient Goals: rest  Family Goals: updates    Progress made toward(s) clinical / shift goals:  Pt is A&Ox4, pain and nausea have been managed with medication given per MAR. ABX and fluids given throughout shift as scheduled. Bed locked in lowest position, call light and belongings within reach.    Problem: Knowledge Deficit - Standard  Goal: Patient and family/care givers will demonstrate understanding of plan of care, disease process/condition, diagnostic tests and medications  Outcome: Progressing     Problem: Pain - Standard  Goal: Alleviation of pain or a reduction in pain to the patient’s comfort goal  Outcome: Progressing       Patient is not progressing towards the following goals:

## 2025-06-22 NOTE — HOSPITAL COURSE
Allyn Rosenberg is a 70 y.o. female who presented 6/21/2025 with history of dyslipidemia, hypothyroidism, depression     Patient presents for evaluation of abdominal pain and diarrhea with bloody bowel movements.  She reports symptom began early morning of admission.  She initially felt pain in her upper abdomen associate with bloating, and then had a diarrhea bowel movement followed by a second bowel movement which was frankly bloody.  She has not had any melena.  She has had some mild nausea but no vomiting.  Some cold sweats, but no fever that she is aware of.  No recent travel though she did go to [place] about 1 month ago.  She did take antibiotics 1 month ago for UTI, she thinks it was a sulfa drug but she is uncertain which one exactly.  No sick contacts other than a grandchild with cold. She reports history of diverticulosis. She had a colonoscopy 2 years ago and noted to have pre-cancerous polyps and 3 year colonoscopy was recommended. She denies hx of IBD.    [Potential consequences of obesity discussed] : Potential consequences of obesity discussed [Benefits of weight loss discussed] : Benefits of weight loss discussed [Structured Weight Management Program suggested:] : Structured weight management program suggested [Encouraged to maintain food diary] : Encouraged to maintain food diary [Encouraged to increase physical activity] : Encouraged to increase physical activity [Weight management counseling provided] : Weight management [Healthy eating counseling provided] : healthy eating [Activity counseling provided] : activity [Low Fat Diet] : Low fat diet [Decrease Portions] : Decrease food portions [None] : None [Needs reinforcement, provided] : Patient needs reinforcement on understanding lifestyle changes and  the steps needed to achieve self management goals and reinforcement was provided

## 2025-06-23 VITALS
OXYGEN SATURATION: 96 % | WEIGHT: 157.85 LBS | DIASTOLIC BLOOD PRESSURE: 67 MMHG | SYSTOLIC BLOOD PRESSURE: 133 MMHG | HEIGHT: 65 IN | TEMPERATURE: 98.2 F | HEART RATE: 60 BPM | BODY MASS INDEX: 26.3 KG/M2 | RESPIRATION RATE: 17 BRPM

## 2025-06-23 LAB
ADV 40+41 DNA STL QL NAA+NON-PROBE: NOT DETECTED
ANION GAP SERPL CALC-SCNC: 8 MMOL/L (ref 7–16)
ASTRO TYP 1-8 RNA STL QL NAA+NON-PROBE: NOT DETECTED
BUN SERPL-MCNC: 8 MG/DL (ref 8–22)
C CAYETANENSIS DNA STL QL NAA+NON-PROBE: NOT DETECTED
C COLI+JEJ+UPSA DNA STL QL NAA+NON-PROBE: NOT DETECTED
CALCIUM SERPL-MCNC: 8.9 MG/DL (ref 8.5–10.5)
CHLORIDE SERPL-SCNC: 107 MMOL/L (ref 96–112)
CO2 SERPL-SCNC: 25 MMOL/L (ref 20–33)
CREAT SERPL-MCNC: 0.79 MG/DL (ref 0.5–1.4)
CRYPTOSP DNA STL QL NAA+NON-PROBE: NOT DETECTED
E HISTOLYT DNA STL QL NAA+NON-PROBE: NOT DETECTED
EAEC PAA PLAS AGGR+AATA ST NAA+NON-PRB: NOT DETECTED
EC STX1+STX2 GENES STL QL NAA+NON-PROBE: NOT DETECTED
EPEC EAE GENE STL QL NAA+NON-PROBE: NOT DETECTED
ERYTHROCYTE [DISTWIDTH] IN BLOOD BY AUTOMATED COUNT: 46.6 FL (ref 35.9–50)
ETEC LTA+ST1A+ST1B TOX ST NAA+NON-PROBE: NOT DETECTED
G LAMBLIA DNA STL QL NAA+NON-PROBE: NOT DETECTED
GFR SERPLBLD CREATININE-BSD FMLA CKD-EPI: 80 ML/MIN/1.73 M 2
GLUCOSE SERPL-MCNC: 92 MG/DL (ref 65–99)
HCT VFR BLD AUTO: 34.7 % (ref 37–47)
HGB BLD-MCNC: 11.3 G/DL (ref 12–16)
MAGNESIUM SERPL-MCNC: 1.9 MG/DL (ref 1.5–2.5)
MCH RBC QN AUTO: 31 PG (ref 27–33)
MCHC RBC AUTO-ENTMCNC: 32.6 G/DL (ref 32.2–35.5)
MCV RBC AUTO: 95.3 FL (ref 81.4–97.8)
NOROVIRUS GI+II RNA STL QL NAA+NON-PROBE: NOT DETECTED
P SHIGELLOIDES DNA STL QL NAA+NON-PROBE: NOT DETECTED
PLATELET # BLD AUTO: 248 K/UL (ref 164–446)
PMV BLD AUTO: 9.7 FL (ref 9–12.9)
POTASSIUM SERPL-SCNC: 3.8 MMOL/L (ref 3.6–5.5)
RBC # BLD AUTO: 3.64 M/UL (ref 4.2–5.4)
RVA RNA STL QL NAA+NON-PROBE: NOT DETECTED
S ENT+BONG DNA STL QL NAA+NON-PROBE: NOT DETECTED
SAPO I+II+IV+V RNA STL QL NAA+NON-PROBE: NOT DETECTED
SHIGELLA SP+EIEC IPAH ST NAA+NON-PROBE: NOT DETECTED
SODIUM SERPL-SCNC: 140 MMOL/L (ref 135–145)
V CHOL+PARA+VUL DNA STL QL NAA+NON-PROBE: NOT DETECTED
V CHOLERAE DNA STL QL NAA+NON-PROBE: NOT DETECTED
WBC # BLD AUTO: 10.1 K/UL (ref 4.8–10.8)
Y ENTEROCOL DNA STL QL NAA+NON-PROBE: NOT DETECTED

## 2025-06-23 PROCEDURE — 87507 IADNA-DNA/RNA PROBE TQ 12-25: CPT

## 2025-06-23 PROCEDURE — 85027 COMPLETE CBC AUTOMATED: CPT

## 2025-06-23 PROCEDURE — 80048 BASIC METABOLIC PNL TOTAL CA: CPT

## 2025-06-23 PROCEDURE — 700102 HCHG RX REV CODE 250 W/ 637 OVERRIDE(OP): Performed by: HOSPITALIST

## 2025-06-23 PROCEDURE — 700105 HCHG RX REV CODE 258: Performed by: HOSPITALIST

## 2025-06-23 PROCEDURE — A9270 NON-COVERED ITEM OR SERVICE: HCPCS | Performed by: HOSPITALIST

## 2025-06-23 PROCEDURE — 83735 ASSAY OF MAGNESIUM: CPT

## 2025-06-23 PROCEDURE — 99239 HOSP IP/OBS DSCHRG MGMT >30: CPT | Performed by: STUDENT IN AN ORGANIZED HEALTH CARE EDUCATION/TRAINING PROGRAM

## 2025-06-23 RX ORDER — ACETAMINOPHEN 500 MG
500-1000 TABLET ORAL EVERY 6 HOURS PRN
COMMUNITY
Start: 2025-06-23

## 2025-06-23 RX ADMIN — BUPROPION HYDROCHLORIDE 150 MG: 150 TABLET, FILM COATED, EXTENDED RELEASE ORAL at 05:01

## 2025-06-23 RX ADMIN — SODIUM CHLORIDE, POTASSIUM CHLORIDE, SODIUM LACTATE AND CALCIUM CHLORIDE: 600; 310; 30; 20 INJECTION, SOLUTION INTRAVENOUS at 05:02

## 2025-06-23 RX ADMIN — AZITHROMYCIN DIHYDRATE 500 MG: 250 TABLET ORAL at 05:00

## 2025-06-23 RX ADMIN — ESCITALOPRAM OXALATE 20 MG: 10 TABLET ORAL at 05:01

## 2025-06-23 ASSESSMENT — PAIN DESCRIPTION - PAIN TYPE: TYPE: ACUTE PAIN

## 2025-06-23 NOTE — CARE PLAN
The patient is Stable - Low risk of patient condition declining or worsening    Shift Goals  Clinical Goals: Pain less than 5/10 on pain scale, IV abx  Patient Goals: Comfort  Family Goals: Not Present    Progress made toward(s) clinical / shift goals:      Problem: Knowledge Deficit - Standard  Goal: Patient and family/care givers will demonstrate understanding of plan of care, disease process/condition, diagnostic tests and medications  Outcome: Progressing   Pt verbalizes understanding of diagnosis and is an active participant in care provided. No questions or concerns at this time.     Problem: Pain - Standard  Goal: Alleviation of pain or a reduction in pain to the patient’s comfort goal  Outcome: Progressing  Pt is able to verbalize pain using NRS pain scale. Pt's pain is managed through pharmacological intervention per MAR, repositioning techniques, and cold packs.      Patient is not progressing towards the following goals:

## 2025-06-23 NOTE — PROGRESS NOTES
Discharge orders received.  Patient arrived to the discharge lounge.  PIV removed by bedside RN prior to arrival. AVS instructions given, medications reviewed and general discharge education provided to patient.  Follow up appointments discussed.  Patient verbalized understanding of dc instructions and prescriptions.  Patient signed discharge instructions.  Patient verbalized understanding and had all belongings with her.  Patient left with ride. Wished patient a speedy recovery.

## 2025-06-23 NOTE — PROGRESS NOTES
Bedside report received.  Assessment complete.  A&O x 4. Patient calls appropriately.  Patient ambulates with stand by assist. Bed alarm on.   Patient has 4/10 pain. Patient declines pain intervention at this time.  Denies N&V. Tolerating Low Fiber diet.  + void, + flatus, 06/22 BM.  Patient denies SOB.  SCD's off, pt refused, education provided.  Patient resting in bed, no other needs at this time.  Review plan with of care with patient. Call light and personal belongings within reach. Hourly rounding in place. All needs met at this time.

## 2025-06-23 NOTE — DISCHARGE PLANNING
Case Management Discharge Planning    Admission Date: 6/21/2025  GMLOS: 2.5  ALOS: 2    6-Clicks ADL Score: 24  6-Clicks Mobility Score: 24      Anticipated Discharge Dispo: Discharge Disposition: Discharged to home/self care (01)    DME Needed: No    Action(s) Taken: LMSW met with the patient at bedside to complete an IMM. IMM charted and scanned to the DPA. Patient's DC ride is her sister.     Escalations Completed: None    Medically Clear: Yes    Next Steps: Patient to discharge with family.     Barriers to Discharge: None    Is the patient up for discharge tomorrow: No

## 2025-06-24 ENCOUNTER — NON-PROVIDER VISIT (OUTPATIENT)
Dept: GENETICS | Facility: MEDICAL CENTER | Age: 70
End: 2025-06-24
Payer: MEDICARE

## 2025-06-24 LAB
BACTERIA STL CULT: NORMAL
E COLI SXT1+2 STL IA: NORMAL
SIGNIFICANT IND 70042: NORMAL
SITE SITE: NORMAL
SOURCE SOURCE: NORMAL

## 2025-06-24 NOTE — PROGRESS NOTES
Allyn Rosenberg is a 70 y.o. female here for a non-provider visit for: Lab Draws  on 6/24/2025 at 9:24 AM    Procedure Performed: Venipuncture     Anatomical site: Right Antecubital Area (AC)    Equipment used: 25 butterfly    Labs drawn: MD [employer] (two lavender EDTA tubes)    Ordering Provider: [facility] [employer]    Jesus By: Tori Garcia, Med Ass't

## 2025-06-25 NOTE — DISCHARGE SUMMARY
Discharge Summary    CHIEF COMPLAINT ON ADMISSION  Chief Complaint   Patient presents with    Abdominal Pain     Pt has diverticulosis - thinks she is having a flare up. Bright red bloody diarrhea since 0400        Reason for Admission  lower abdominal pain , bloody stoo*     Admission Date  6/21/2025    CODE STATUS  Prior    HPI & HOSPITAL COURSE    Allyn Rosenberg is a very pleasant 70 y.o. female who presented 6/21/2025 with history of dyslipidemia, hypothyroidism, depression and diverticulosis who presented to the hospital for  evaluation of abdominal pain and diarrhea with bloody bowel movements.  She reports symptom began early morning of admission.  She initially felt pain in her upper abdomen associate with bloating, and then had a diarrhea bowel movement followed by a second bowel movement which was frankly bloody.  She has not had any melena.  She has had some mild nausea but no vomiting.  Some cold sweats, but no fever that she is aware of.  No recent travel though she did go to [place] about 1 month ago.  She did take antibiotics 1 month ago for UTI, she thinks it was a sulfa drug but she is uncertain which one exactly.  No sick contacts other than a grandchild with cold. She reports history of diverticulosis. She had a colonoscopy 2 years ago and noted to have pre-cancerous polyps and 3 year colonoscopy was recommended. She denied history of IBD.  On admission she underwent CT abdomen which showed sigmoid colitis. Due to ongoing pain, bloody diarrhea and colitis on CT she was admitted for further evaluation. She was started on IV azithromycin and pain control. Her hemoglobin and hematocrit were monitored and remained stable. Her hematchezia and diarrhea improved and she was tolerating a diet although her appetite was not yet at baseline. Her stool studies including C.diff and GI PCR panel were negative. Stool culture negative. She reported improvement in all of her symptoms and preferred to  recover at home. She completed 3 day course of azithromycin. I educated that should she continue to have symptoms, or failure to improve, or development of worsening pain, fevers, or other concerning symptoms, she should seek further medical care. She was given another referral to GI for follow up.     Therefore, she is discharged in fair and stable condition to home with close outpatient follow-up.    The patient met 2-midnight criteria for an inpatient stay at the time of discharge.    Discharge Date  6/23/2025    FOLLOW UP ITEMS POST DISCHARGE  Resolution of colitis symptoms   Chronic medical conditions   Needs repeat colonoscopy    DISCHARGE DIAGNOSES  Principal Problem:    Colitis (POA: Yes)  Active Problems:    Dyslipidemia (Chronic) (POA: Yes)    Acquired hypothyroidism (Chronic) (POA: Yes)    MDD (major depressive disorder), recurrent, in full remission (HCC) (Chronic) (POA: Yes)  Resolved Problems:    * No resolved hospital problems. *      FOLLOW UP  Future Appointments   Date Time Provider Department Center   11/14/2025  1:20 PM ELDER Chang [facility]   12/19/2025 11:00 AM [facility]   3/17/2026 11:20 AM LAURA Velásquezws     ELDER Chang  [address] [id number] [phone]          [facility]  [address] [place] [address]  [phone]        [facility]  [address] [place] [address]  [phone]          MEDICATIONS ON DISCHARGE     Medication List        START taking these medications        Instructions   acetaminophen 500 MG Tabs  Commonly known as: Tylenol   Take 1-2 Tablets by mouth every 6 hours as needed for Mild Pain or Moderate Pain.  Dose: 500-1,000 mg     HYDROcodone-acetaminophen 5-325 MG Tabs per tablet  Commonly known as: Norco   Take 1 Tablet by mouth every 6 hours as needed (abdominal pain) for up to 5 days.  Dose: 1 Tablet     ondansetron 4 MG Tbdp  Commonly  known as: Zofran ODT   Take 1 Tablet by mouth every 6 hours as needed for Nausea/Vomiting.  Dose: 4 mg            CONTINUE taking these medications        Instructions   buPROPion 150 MG XL tablet  Commonly known as: Wellbutrin XL   Take 1 Tablet by mouth every morning.  Dose: 150 mg     escitalopram 20 MG tablet  Commonly known as: Lexapro   Take 1 Tablet by mouth every day. FOR ANXIETY AND DEPRESSION  Dose: 20 mg     propranolol 10 MG Tabs  Commonly known as: Inderal   Take 1 Tablet by mouth 1 time a day as needed (ANXIETY).  Dose: 10 mg            ASK your doctor about these medications        Instructions   azithromycin 500 MG tablet  Commonly known as: Zithromax  Ask about: Should I take this medication?   Take 1 Tablet by mouth every day for 3 days.  Dose: 500 mg              Allergies  Allergies[1]    DIET  No orders of the defined types were placed in this encounter.      ACTIVITY  As tolerated.      CONSULTATIONS  NA    PROCEDURES  NA    LABORATORY  Lab Results   Component Value Date    SODIUM 140 06/23/2025    POTASSIUM 3.8 06/23/2025    CHLORIDE 107 06/23/2025    CO2 25 06/23/2025    GLUCOSE 92 06/23/2025    BUN 8 06/23/2025    CREATININE 0.79 06/23/2025        Lab Results   Component Value Date    WBC 10.1 06/23/2025    HEMOGLOBIN 11.3 (L) 06/23/2025    HEMATOCRIT 34.7 (L) 06/23/2025    PLATELETCT 248 06/23/2025        Total time of the discharge process exceeds 36 minutes.       [1]   Allergies  Allergen Reactions    Peanut (Diagnostic) Shortness of Breath    Erythromycin Vomiting and Nausea

## 2025-06-27 NOTE — Clinical Note
REFERRAL APPROVAL NOTICE         Sent on June 27, 2025                   Allyn Rosenberg  [address] [facility] [facility] [address]                   Dear Ms. Rosenberg,    After a careful review of the medical information and benefit coverage, [facility] has processed your referral. See below for additional details.    If applicable, you must be actively enrolled with your insurance for coverage of the authorized service. If you have any questions regarding your coverage, please contact your insurance directly.    REFERRAL INFORMATION   Referral #:  [id number]  Referred-To Provider    Referred-By Provider:  Gastroenterology    Mikayla Gallego M.D.   [facility]      [facility] [facility]  [address]  [facility] [address]  [phone] [id number]  [facility] [address]  [phone]    Referral Start Date:  06/21/2025  Referral End Date:   06/21/2026             SCHEDULING  If you do not already have an appointment, please call [phone] to make an appointment.     MORE INFORMATION  If you do not already have a [facility] account, sign up at: [email or web address]  You can access your medical information, make appointments, see lab results, billing information, and more.  If you have questions regarding this referral, please contact  the [facility] Referrals department at:             [phone]. Monday - Friday 8:00AM - 5:00PM.     Sincerely,    [facility]

## 2025-07-09 PROBLEM — D62 ACUTE BLOOD LOSS ANEMIA: Status: ACTIVE | Noted: 2025-07-09

## 2025-07-09 PROBLEM — K52.9 COLITIS: Chronic | Status: ACTIVE | Noted: 2025-06-21

## 2025-07-09 PROBLEM — R68.89 DIFFICULTY LOSING WEIGHT: Status: ACTIVE | Noted: 2025-07-09

## 2025-07-17 PROBLEM — Z15.01 ATM GENE MUTATION POSITIVE: Status: ACTIVE | Noted: 2025-07-17

## 2025-07-17 PROBLEM — Z15.09 ATM GENE MUTATION POSITIVE: Status: ACTIVE | Noted: 2025-07-17

## 2025-08-14 PROBLEM — K52.9 COLITIS: Chronic | Status: RESOLVED | Noted: 2025-06-21 | Resolved: 2025-08-14

## 2025-08-14 PROBLEM — Z13.79 GENETIC SCREENING: Chronic | Status: RESOLVED | Noted: 2022-05-03 | Resolved: 2025-08-14
